# Patient Record
Sex: FEMALE | Race: WHITE | NOT HISPANIC OR LATINO | Employment: UNEMPLOYED | URBAN - METROPOLITAN AREA
[De-identification: names, ages, dates, MRNs, and addresses within clinical notes are randomized per-mention and may not be internally consistent; named-entity substitution may affect disease eponyms.]

---

## 2021-02-01 ENCOUNTER — TELEPHONE (OUTPATIENT)
Dept: PODIATRY | Facility: CLINIC | Age: 51
End: 2021-02-01

## 2021-02-01 NOTE — TELEPHONE ENCOUNTER
Called patient and left message to reschedule her appointment with Dr Noemi Villareal for today due to inclement weather

## 2021-02-15 ENCOUNTER — OFFICE VISIT (OUTPATIENT)
Dept: PODIATRY | Facility: CLINIC | Age: 51
End: 2021-02-15
Payer: COMMERCIAL

## 2021-02-15 VITALS
TEMPERATURE: 98.7 F | HEIGHT: 66 IN | HEART RATE: 101 BPM | BODY MASS INDEX: 33.23 KG/M2 | WEIGHT: 206.8 LBS | SYSTOLIC BLOOD PRESSURE: 115 MMHG | DIASTOLIC BLOOD PRESSURE: 80 MMHG

## 2021-02-15 DIAGNOSIS — E11.40 TYPE 2 DIABETES MELLITUS WITH DIABETIC NEUROPATHY, WITH LONG-TERM CURRENT USE OF INSULIN (HCC): Primary | ICD-10-CM

## 2021-02-15 DIAGNOSIS — M79.2 NEUROPATHIC PAIN OF FOOT, UNSPECIFIED LATERALITY: ICD-10-CM

## 2021-02-15 DIAGNOSIS — Z79.4 TYPE 2 DIABETES MELLITUS WITH DIABETIC NEUROPATHY, WITH LONG-TERM CURRENT USE OF INSULIN (HCC): Primary | ICD-10-CM

## 2021-02-15 DIAGNOSIS — I73.9 PERIPHERAL VASCULAR DISEASE, UNSPECIFIED (HCC): ICD-10-CM

## 2021-02-15 PROCEDURE — 99204 OFFICE O/P NEW MOD 45 MIN: CPT | Performed by: PODIATRIST

## 2021-02-15 RX ORDER — PEN NEEDLE, DIABETIC 31 GX5/16"
NEEDLE, DISPOSABLE MISCELLANEOUS
COMMUNITY
Start: 2021-01-29

## 2021-02-15 RX ORDER — INSULIN LISPRO 100 [IU]/ML
INJECTION, SOLUTION INTRAVENOUS; SUBCUTANEOUS
COMMUNITY
Start: 2021-01-29

## 2021-02-15 RX ORDER — LISINOPRIL AND HYDROCHLOROTHIAZIDE 25; 20 MG/1; MG/1
TABLET ORAL
COMMUNITY
Start: 2021-02-14

## 2021-02-15 RX ORDER — INSULIN GLARGINE 100 [IU]/ML
INJECTION, SOLUTION SUBCUTANEOUS
COMMUNITY
Start: 2021-01-18

## 2021-02-15 RX ORDER — ATORVASTATIN CALCIUM 40 MG/1
TABLET, FILM COATED ORAL
COMMUNITY
Start: 2021-01-18

## 2021-02-15 RX ORDER — BLOOD-GLUCOSE METER
KIT MISCELLANEOUS
COMMUNITY
Start: 2021-01-18 | End: 2022-03-29

## 2021-02-15 RX ORDER — LANCETS 33 GAUGE
EACH MISCELLANEOUS
COMMUNITY
Start: 2021-01-18 | End: 2022-03-29

## 2021-02-15 RX ORDER — FLUCONAZOLE 150 MG/1
TABLET ORAL
COMMUNITY
Start: 2021-01-18 | End: 2022-05-04 | Stop reason: ALTCHOICE

## 2021-02-15 RX ORDER — GABAPENTIN 600 MG/1
TABLET ORAL
COMMUNITY
Start: 2021-02-14 | End: 2021-04-12 | Stop reason: SDUPTHER

## 2021-02-15 RX ORDER — DULOXETIN HYDROCHLORIDE 30 MG/1
30 CAPSULE, DELAYED RELEASE ORAL DAILY
Qty: 30 CAPSULE | Refills: 0 | Status: SHIPPED | OUTPATIENT
Start: 2021-02-15 | End: 2021-03-15 | Stop reason: SINTOL

## 2021-02-15 RX ORDER — BLOOD SUGAR DIAGNOSTIC
STRIP MISCELLANEOUS
COMMUNITY
Start: 2021-01-28 | End: 2022-03-29

## 2021-02-15 NOTE — PROGRESS NOTES
PATIENT:  Jerome Borden    1970      ASSESSMENT:     1  Type 2 diabetes mellitus with diabetic neuropathy, with long-term current use of insulin (HCC)  VAS lower limb arterial duplex, complete bilateral    DULoxetine (CYMBALTA) 30 mg delayed release capsule   2  Neuropathic pain of foot, unspecified laterality  DULoxetine (CYMBALTA) 30 mg delayed release capsule   3  Peripheral vascular disease, unspecified ([address] )  VAS lower limb arterial duplex, complete bilateral         PLAN:  1  Patient was counseled on the condition and diagnosis  2   Educated disease prevention and risks related to diabetes  3   Educated proper daily foot care and exam   Instructed proper skin care / protection and footwear  Instructed to identify any signs of infection and related foot problem  4   She has painful neuropathy in her feet  Instructed her to control tight management of BS to decreased symptoms  Awaits endocrinology evaluation  5  Will add Cymbalta  Consider pain management consultation  6  Discussed possible surgical nerve decompression if her BS is under better control  7  She has decreased pedal pulses and scheduled her for LE arterial study  8  The patient will return in 1 month  Subjective:          HPI  The patient presents with chief complaint of painful neuropathy in her feet  Increased neurologic pain in the last couple of years with shooting, throbbing, and pins/needles  She also has numbness and cold sensation  Gabapentin takes a edge off of her pain  Her pain is about 8-9 out of 10  Pain can keep her up at night  She was seen by another podiatrist and neurologist for her condition  The patient has diabetes for 20 years  The blood glucose is under control and the last HbA1c was 12 5  The patient denied any history of diabetic foot ulcer, related foot infection, or amputation        The following portions of the patient's history were reviewed and updated as appropriate: allergies, current medications, past family history, past medical history, past social history, past surgical history and problem list   All pertinent labs and images were reviewed  Past Medical History  Past Medical History:   Diagnosis Date    Asthma     Diabetes mellitus ([address] )     Hyperlipidemia     Hypertension        Past Surgical History  History reviewed  No pertinent surgical history  Allergies:  Patient has no known allergies  Medications:  Current Outpatient Medications   Medication Sig Dispense Refill    atorvastatin (LIPITOR) 40 mg tablet       B-D UF III MINI PEN NEEDLES 31G X 5 MM MISC       Blood Glucose Monitoring Suppl (OneTouch Verio Reflect) w/Device KIT       DULoxetine (CYMBALTA) 30 mg delayed release capsule Take 1 capsule (30 mg total) by mouth daily 30 capsule 0    fluconazole (DIFLUCAN) 150 mg tablet       gabapentin (NEURONTIN) 600 MG tablet       insulin lispro (HumaLOG) 100 units/mL injection pen       Lancets (OneTouch Delica Plus [id number]) MISC       Lantus SoloStar 100 units/mL injection pen       lisinopril-hydrochlorothiazide (PRINZIDE,ZESTORETIC) 20-25 MG per tablet       OneTouch Verio test strip        No current facility-administered medications for this visit  Social History:  Social History     Socioeconomic History    Marital status:       Spouse name: None    Number of children: None    Years of education: None    Highest education level: None   Occupational History    None   Social Needs    Financial resource strain: None    Food insecurity     Worry: None     Inability: None    Transportation needs     Medical: None     Non-medical: None   Tobacco Use    Smoking status: Former Smoker    Smokeless tobacco: Never Used   Substance and Sexual Activity    Alcohol use: None    Drug use: None    Sexual activity: None   Lifestyle    Physical activity     Days per week: None     Minutes per session: None    Stress: None Relationships    Social connections     Talks on phone: None     Gets together: None     Attends Religion service: None     Active member of club or organization: None     Attends meetings of clubs or organizations: None     Relationship status: None    Intimate partner violence     Fear of current or ex partner: None     Emotionally abused: None     Physically abused: None     Forced sexual activity: None   Other Topics Concern    None   Social History Narrative    None        Review of Systems   Constitutional: Negative for appetite change, chills and fever  HENT: Negative for sore throat  Respiratory: Negative for cough and shortness of breath  Cardiovascular: Negative for chest pain  Gastrointestinal: Negative for diarrhea, nausea and vomiting  Musculoskeletal: Negative for joint swelling  Skin: Negative for rash and wound  Neurological: Positive for numbness  Negative for weakness  Hematological: Does not bruise/bleed easily  Psychiatric/Behavioral: Negative for behavioral problems and confusion  Objective:      /80   Pulse 101   Temp 98 7 °F (37 1 °C)   Ht 5' 6" (1 676 m)   Wt 93 8 kg (206 lb 12 8 oz)   BMI 33 38 kg/m²          Physical Exam  Vitals signs reviewed  Constitutional:       General: She is not in acute distress  Appearance: Normal appearance  She is not ill-appearing  HENT:      Head: Normocephalic and atraumatic  Eyes:      Extraocular Movements: Extraocular movements intact  Neck:      Musculoskeletal: Normal range of motion and neck supple  Cardiovascular:      Rate and Rhythm: Normal rate and regular rhythm  Pulses: Pulses are weak  Dorsalis pedis pulses are 1+ on the right side and 1+ on the left side  Posterior tibial pulses are 0 on the right side and 0 on the left side  Comments: No ischemia  Pulmonary:      Effort: Pulmonary effort is normal  No respiratory distress     Musculoskeletal:         General: No swelling, tenderness, deformity or signs of injury  Right lower leg: No edema  Left lower leg: No edema  Right foot: No Charcot foot or foot drop  Left foot: No Charcot foot or foot drop  Feet:      Right foot:      Protective Sensation: 10 sites tested  0 sites sensed  Skin integrity: Dry skin present  No ulcer or erythema  Left foot:      Protective Sensation: 10 sites tested  0 sites sensed  Skin integrity: Dry skin present  No ulcer or erythema  Skin:     Findings: No erythema or rash  Neurological:      General: No focal deficit present  Mental Status: She is alert and oriented to person, place, and time  Cranial Nerves: No cranial nerve deficit  Sensory: Sensory deficit present  Motor: No weakness  Coordination: Coordination normal    Psychiatric:         Mood and Affect: Mood normal          Behavior: Behavior normal          Thought Content: Thought content normal          Judgment: Judgment normal              Diabetic Foot Exam    Patient's shoes and socks removed  Right Foot/Ankle   Right Foot Inspection  Skin Exam: skin intact and dry skin no erythema, no pre-ulcer and no ulcer                          Toe Exam: no swelling, no tenderness, erythema and  no right toe deformity  Sensory   Vibration: absent  Proprioception: intact   Monofilament testing: absent  Vascular  Capillary refills: < 3 seconds  The right DP pulse is 1+  The right PT pulse is 0  Left Foot/Ankle  Left Foot Inspection  Skin Exam: skin intact and dry skinno erythema, no pre-ulcer and no ulcer                         Toe Exam: no swelling, no tenderness, no erythema and no left toe deformity                   Sensory   Vibration: absent  Proprioception: intact  Monofilament: absent  Vascular  Capillary refills: < 3 seconds  The left DP pulse is 1+  The left PT pulse is 0  Assign Risk Category:  No deformity present;  Loss of protective sensation; Weak pulses Risk: 2

## 2021-02-23 ENCOUNTER — HOSPITAL ENCOUNTER (OUTPATIENT)
Dept: NON INVASIVE DIAGNOSTICS | Facility: HOSPITAL | Age: 51
Discharge: HOME | End: 2021-02-23
Attending: PODIATRIST
Payer: COMMERCIAL

## 2021-02-23 DIAGNOSIS — Z79.4 TYPE 2 DIABETES MELLITUS WITH DIABETIC NEUROPATHY, WITH LONG-TERM CURRENT USE OF INSULIN (HCC): ICD-10-CM

## 2021-02-23 DIAGNOSIS — E11.40 TYPE 2 DIABETES MELLITUS WITH DIABETIC NEUROPATHY, WITH LONG-TERM CURRENT USE OF INSULIN (HCC): ICD-10-CM

## 2021-02-23 DIAGNOSIS — I73.9 PERIPHERAL VASCULAR DISEASE, UNSPECIFIED (HCC): ICD-10-CM

## 2021-02-23 PROCEDURE — 93923 UPR/LXTR ART STDY 3+ LVLS: CPT

## 2021-02-23 PROCEDURE — 93925 LOWER EXTREMITY STUDY: CPT | Performed by: SURGERY

## 2021-02-23 PROCEDURE — 93922 UPR/L XTREMITY ART 2 LEVELS: CPT | Performed by: SURGERY

## 2021-02-23 PROCEDURE — 93925 LOWER EXTREMITY STUDY: CPT

## 2021-03-15 ENCOUNTER — OFFICE VISIT (OUTPATIENT)
Dept: PODIATRY | Facility: CLINIC | Age: 51
End: 2021-03-15
Payer: COMMERCIAL

## 2021-03-15 VITALS
HEIGHT: 66 IN | SYSTOLIC BLOOD PRESSURE: 128 MMHG | DIASTOLIC BLOOD PRESSURE: 82 MMHG | WEIGHT: 207 LBS | BODY MASS INDEX: 33.27 KG/M2

## 2021-03-15 DIAGNOSIS — L97.511 SKIN ULCER OF TOE OF RIGHT FOOT, LIMITED TO BREAKDOWN OF SKIN (HCC): ICD-10-CM

## 2021-03-15 DIAGNOSIS — Z79.4 TYPE 2 DIABETES MELLITUS WITH DIABETIC NEUROPATHY, WITH LONG-TERM CURRENT USE OF INSULIN (HCC): Primary | ICD-10-CM

## 2021-03-15 DIAGNOSIS — M79.2 NEUROPATHIC PAIN OF FOOT, UNSPECIFIED LATERALITY: ICD-10-CM

## 2021-03-15 DIAGNOSIS — E11.40 TYPE 2 DIABETES MELLITUS WITH DIABETIC NEUROPATHY, WITH LONG-TERM CURRENT USE OF INSULIN (HCC): Primary | ICD-10-CM

## 2021-03-15 PROCEDURE — 99214 OFFICE O/P EST MOD 30 MIN: CPT | Performed by: PODIATRIST

## 2021-03-15 NOTE — PROGRESS NOTES
PATIENT:  Siomara Cabrera    1970      ASSESSMENT:     1  Type 2 diabetes mellitus with diabetic neuropathy, with long-term current use of insulin ([facility] )  Ambulatory referral to Pain Management   2  Neuropathic pain of foot, unspecified laterality  Ambulatory referral to Pain Management   3  Skin ulcer of toe of right foot, limited to breakdown of skin (HCC)  mupirocin (BACTROBAN) 2 % ointment         PLAN:  1  Patient was counseled on the condition and diagnosis  2   Educated disease prevention and risks related to diabetes  3   Reviewed arterial study and the result was discussed  4   Instructed her to control tight management of BS to decreased symptoms  5   D/C Cymbalta  Referred him to pain management  6   Possible surgical nerve decompression if her BS is under better control  7   Bactroban ointment bid on right great toe wound  Instructed proper off-loading  Instructed her to call the office for any increased pain, drainage, redness, worsening, or signs of infection  8  The patient will return in 2 weeks for checking right great toe  Subjective:          HPI  The patient presents with chief complaint of painful neuropathy in her feet  Cymbalta did not seem to help  She had nausea with Cymbalta and stopped it after about 2 weeks  She has shooting, throbbing, and pins/needles  She also has numbness and cold sensation  She is on Gabapentin  BS is still not in good control  She is seeing endocrinology  She noticed a crack on skin right toe  No active bleeding, but it does not seem to heal with neosporin  The following portions of the patient's history were reviewed and updated as appropriate: allergies, current medications, past family history, past medical history, past social history, past surgical history and problem list   All pertinent labs and images were reviewed      Past Medical History  Past Medical History:   Diagnosis Date    Asthma     Diabetes mellitus ([facility] )     Hyperlipidemia     Hypertension        Past Surgical History  History reviewed  No pertinent surgical history  Allergies:  Patient has no known allergies  Medications:  Current Outpatient Medications   Medication Sig Dispense Refill    atorvastatin (LIPITOR) 40 mg tablet       B-D UF III MINI PEN NEEDLES 31G X 5 MM MISC       Blood Glucose Monitoring Suppl (OneTouch Verio Reflect) w/Device KIT       fluconazole (DIFLUCAN) 150 mg tablet       gabapentin (NEURONTIN) 600 MG tablet       insulin lispro (HumaLOG) 100 units/mL injection pen       Lancets (OneTouch Delica Plus [id number]) MISC       Lantus SoloStar 100 units/mL injection pen       lisinopril-hydrochlorothiazide (PRINZIDE,ZESTORETIC) 20-25 MG per tablet       mupirocin (BACTROBAN) 2 % ointment Apply topically 3 (three) times a day 22 g 0    OneTouch Verio test strip        No current facility-administered medications for this visit  Social History:  Social History     Socioeconomic History    Marital status:       Spouse name: None    Number of children: None    Years of education: None    Highest education level: None   Occupational History    None   Social Needs    Financial resource strain: None    Food insecurity     Worry: None     Inability: None    Transportation needs     Medical: None     Non-medical: None   Tobacco Use    Smoking status: Former Smoker    Smokeless tobacco: Never Used   Substance and Sexual Activity    Alcohol use: None    Drug use: None    Sexual activity: None   Lifestyle    Physical activity     Days per week: None     Minutes per session: None    Stress: None   Relationships    Social connections     Talks on phone: None     Gets together: None     Attends Jewish service: None     Active member of club or organization: None     Attends meetings of clubs or organizations: None     Relationship status: None    Intimate partner violence     Fear of current or ex partner: None     Emotionally abused: None     Physically abused: None     Forced sexual activity: None   Other Topics Concern    None   Social History Narrative    None        Review of Systems   Constitutional: Negative for appetite change, chills and fever  HENT: Negative for sore throat  Respiratory: Negative for cough and shortness of breath  Cardiovascular: Negative for chest pain  Gastrointestinal: Negative for diarrhea, nausea and vomiting  Neurological: Positive for numbness  Negative for weakness  Hematological: Negative  Objective:      /82   Ht 5' 6" (1 676 m)   Wt 93 9 kg (207 lb)   BMI 33 41 kg/m²          Physical Exam  Vitals signs reviewed  Constitutional:       General: She is not in acute distress  Appearance: Normal appearance  She is not ill-appearing  Cardiovascular:      Rate and Rhythm: Normal rate and regular rhythm  Pulses: Pulses are weak  Dorsalis pedis pulses are 1+ on the right side and 1+ on the left side  Posterior tibial pulses are 0 on the right side and 0 on the left side  Comments: No ischemia  Pulmonary:      Effort: Pulmonary effort is normal  No respiratory distress  Musculoskeletal:         General: No swelling, tenderness, deformity or signs of injury  Right lower leg: No edema  Left lower leg: No edema  Right foot: No Charcot foot or foot drop  Left foot: No Charcot foot or foot drop  Feet:      Right foot:      Protective Sensation: 10 sites tested  0 sites sensed  Skin integrity: Dry skin present  No ulcer or erythema  Left foot:      Protective Sensation: 10 sites tested  0 sites sensed  Skin integrity: Dry skin present  No ulcer or erythema  Skin:     General: Skin is warm and dry  Capillary Refill: Capillary refill takes less than 2 seconds  Coloration: Skin is not cyanotic or mottled  Findings: No abscess, erythema, petechiae or rash   Rash is not purpuric  Nails: There is no clubbing  Comments: Fissure with keratosis distal medial right great toe  No cellulitis  Neurological:      General: No focal deficit present  Mental Status: She is alert and oriented to person, place, and time  Cranial Nerves: No cranial nerve deficit  Sensory: Sensory deficit present  Motor: No weakness  Coordination: Coordination normal    Psychiatric:         Mood and Affect: Mood normal          Behavior: Behavior normal          Thought Content:  Thought content normal          Judgment: Judgment normal

## 2021-03-25 ENCOUNTER — TELEPHONE (OUTPATIENT)
Dept: PAIN MEDICINE | Facility: CLINIC | Age: 51
End: 2021-03-25

## 2021-03-25 NOTE — TELEPHONE ENCOUNTER
Lm on vm for pt to c/b  Pls ask if I can email her np paperwork and obtain email  She may pick it up  We can try to mail it, but it may not get there in time

## 2021-03-29 ENCOUNTER — OFFICE VISIT (OUTPATIENT)
Dept: PODIATRY | Facility: CLINIC | Age: 51
End: 2021-03-29
Payer: COMMERCIAL

## 2021-03-29 ENCOUNTER — APPOINTMENT (OUTPATIENT)
Dept: RADIOLOGY | Facility: CLINIC | Age: 51
End: 2021-03-29
Payer: COMMERCIAL

## 2021-03-29 VITALS
BODY MASS INDEX: 34.3 KG/M2 | HEART RATE: 102 BPM | WEIGHT: 213.4 LBS | SYSTOLIC BLOOD PRESSURE: 135 MMHG | HEIGHT: 66 IN | DIASTOLIC BLOOD PRESSURE: 84 MMHG

## 2021-03-29 DIAGNOSIS — L97.511 SKIN ULCER OF TOE OF RIGHT FOOT, LIMITED TO BREAKDOWN OF SKIN (HCC): ICD-10-CM

## 2021-03-29 DIAGNOSIS — L03.031 CELLULITIS OF TOE, RIGHT: Primary | ICD-10-CM

## 2021-03-29 DIAGNOSIS — Z79.4 TYPE 2 DIABETES MELLITUS WITH DIABETIC NEUROPATHY, WITH LONG-TERM CURRENT USE OF INSULIN (HCC): ICD-10-CM

## 2021-03-29 DIAGNOSIS — L03.031 CELLULITIS OF TOE, RIGHT: ICD-10-CM

## 2021-03-29 DIAGNOSIS — E11.40 TYPE 2 DIABETES MELLITUS WITH DIABETIC NEUROPATHY, WITH LONG-TERM CURRENT USE OF INSULIN (HCC): ICD-10-CM

## 2021-03-29 PROCEDURE — 73660 X-RAY EXAM OF TOE(S): CPT

## 2021-03-29 PROCEDURE — 99214 OFFICE O/P EST MOD 30 MIN: CPT | Performed by: PODIATRIST

## 2021-03-29 RX ORDER — SULFAMETHOXAZOLE AND TRIMETHOPRIM 800; 160 MG/1; MG/1
1 TABLET ORAL EVERY 12 HOURS SCHEDULED
Qty: 14 TABLET | Refills: 0 | Status: SHIPPED | OUTPATIENT
Start: 2021-03-29 | End: 2021-04-05

## 2021-04-12 ENCOUNTER — CONSULT (OUTPATIENT)
Dept: PAIN MEDICINE | Facility: CLINIC | Age: 51
End: 2021-04-12
Payer: COMMERCIAL

## 2021-04-12 ENCOUNTER — OFFICE VISIT (OUTPATIENT)
Dept: PODIATRY | Facility: CLINIC | Age: 51
End: 2021-04-12
Payer: COMMERCIAL

## 2021-04-12 VITALS
HEART RATE: 85 BPM | WEIGHT: 211 LBS | BODY MASS INDEX: 33.91 KG/M2 | SYSTOLIC BLOOD PRESSURE: 149 MMHG | HEIGHT: 66 IN | DIASTOLIC BLOOD PRESSURE: 89 MMHG

## 2021-04-12 VITALS
HEART RATE: 100 BPM | WEIGHT: 211 LBS | BODY MASS INDEX: 33.91 KG/M2 | DIASTOLIC BLOOD PRESSURE: 84 MMHG | TEMPERATURE: 97.3 F | HEIGHT: 66 IN | SYSTOLIC BLOOD PRESSURE: 138 MMHG

## 2021-04-12 DIAGNOSIS — E11.40 TYPE 2 DIABETES MELLITUS WITH DIABETIC NEUROPATHY, WITH LONG-TERM CURRENT USE OF INSULIN (HCC): Primary | ICD-10-CM

## 2021-04-12 DIAGNOSIS — E11.42 DIABETIC POLYNEUROPATHY ASSOCIATED WITH TYPE 2 DIABETES MELLITUS (HCC): ICD-10-CM

## 2021-04-12 DIAGNOSIS — M79.605 CHRONIC PAIN OF BOTH LOWER EXTREMITIES: ICD-10-CM

## 2021-04-12 DIAGNOSIS — G89.29 CHRONIC PAIN OF BOTH LOWER EXTREMITIES: ICD-10-CM

## 2021-04-12 DIAGNOSIS — M79.673 CHRONIC FOOT PAIN, UNSPECIFIED LATERALITY: ICD-10-CM

## 2021-04-12 DIAGNOSIS — M79.2 NEUROPATHIC PAIN OF FOOT, UNSPECIFIED LATERALITY: ICD-10-CM

## 2021-04-12 DIAGNOSIS — G89.4 CHRONIC PAIN SYNDROME: Primary | ICD-10-CM

## 2021-04-12 DIAGNOSIS — Z79.4 TYPE 2 DIABETES MELLITUS WITH DIABETIC NEUROPATHY, WITH LONG-TERM CURRENT USE OF INSULIN (HCC): Primary | ICD-10-CM

## 2021-04-12 DIAGNOSIS — M79.604 CHRONIC PAIN OF BOTH LOWER EXTREMITIES: ICD-10-CM

## 2021-04-12 DIAGNOSIS — G89.29 CHRONIC FOOT PAIN, UNSPECIFIED LATERALITY: ICD-10-CM

## 2021-04-12 DIAGNOSIS — L03.031 CELLULITIS OF TOE, RIGHT: ICD-10-CM

## 2021-04-12 PROCEDURE — 99213 OFFICE O/P EST LOW 20 MIN: CPT | Performed by: PODIATRIST

## 2021-04-12 PROCEDURE — 99214 OFFICE O/P EST MOD 30 MIN: CPT | Performed by: NURSE PRACTITIONER

## 2021-04-12 RX ORDER — GABAPENTIN 600 MG/1
TABLET ORAL
Qty: 120 TABLET | Refills: 1 | Status: SHIPPED | OUTPATIENT
Start: 2021-04-12 | End: 2021-06-07

## 2021-04-12 NOTE — PROGRESS NOTES
PATIENT:  Nelma Kawasaki    1970      ASSESSMENT:     1  Type 2 diabetes mellitus with diabetic neuropathy, with long-term current use of insulin ([facility] )     2  Neuropathic pain of foot, unspecified laterality     3  Cellulitis of toe, right           PLAN:  1  Patient was counseled on the condition and diagnosis  2   X-ray was personally reviewed and discussed  Also reviewed the note from pain management  3   Redness is mostly resolved  Instructed proper skin care and nail care  Discussed possible partial nail avulsion depending on the progress  4   Instructed her to call the office for any increased pain, drainage, redness, worsening, or signs of infection  5  Instructed supportive care and medical treatment for her neuropathy  The patient will return in 2 months  Subjective:          HPI  The patient presents for evaluation of right great toe  No pain or drainage  Decreased redness  Slight burning sensation at times  BS is still up and down  The following portions of the patient's history were reviewed and updated as appropriate: allergies, current medications, past family history, past medical history, past social history, past surgical history and problem list   All pertinent labs and images were reviewed  Past Medical History  Past Medical History:   Diagnosis Date    Asthma     Diabetes mellitus ([facility] )     History of angina     Hyperlipidemia     Hypertension        Past Surgical History  Past Surgical History:   Procedure Laterality Date    BLADDER SURGERY          Allergies:  Patient has no known allergies      Medications:  Current Outpatient Medications   Medication Sig Dispense Refill    atorvastatin (LIPITOR) 40 mg tablet       B-D UF III MINI PEN NEEDLES 31G X 5 MM MISC       Blood Glucose Monitoring Suppl (OneTouch Verio Reflect) w/Device KIT       fluconazole (DIFLUCAN) 150 mg tablet       gabapentin (NEURONTIN) 600 MG tablet Take 1 in AM and afternoon and 2 PO HS  120 tablet 1    insulin lispro (HumaLOG) 100 units/mL injection pen       Lancets (OneTouch Delica Plus [id number]) MISC       Lantus SoloStar 100 units/mL injection pen       lisinopril-hydrochlorothiazide (PRINZIDE,ZESTORETIC) 20-25 MG per tablet       OneTouch Verio test strip        No current facility-administered medications for this visit  Social History:  Social History     Socioeconomic History    Marital status:      Spouse name: None    Number of children: None    Years of education: None    Highest education level: None   Occupational History    None   Social Needs    Financial resource strain: None    Food insecurity     Worry: None     Inability: None    Transportation needs     Medical: None     Non-medical: None   Tobacco Use    Smoking status: Former Smoker    Smokeless tobacco: Never Used   Substance and Sexual Activity    Alcohol use: Never     Frequency: Never    Drug use: Never    Sexual activity: None   Lifestyle    Physical activity     Days per week: None     Minutes per session: None    Stress: None   Relationships    Social connections     Talks on phone: None     Gets together: None     Attends Latter-day service: None     Active member of club or organization: None     Attends meetings of clubs or organizations: None     Relationship status: None    Intimate partner violence     Fear of current or ex partner: None     Emotionally abused: None     Physically abused: None     Forced sexual activity: None   Other Topics Concern    None   Social History Narrative    None        Review of Systems   Constitutional: Negative for appetite change, chills and fever  HENT: Negative for sore throat  Respiratory: Negative for cough and shortness of breath  Cardiovascular: Negative for chest pain  Gastrointestinal: Negative for diarrhea, nausea and vomiting  Neurological: Positive for numbness  Negative for weakness  Hematological: Negative  Objective:      /89   Pulse 85   Ht 5' 6" (1 676 m) Comment: verbal  Wt 95 7 kg (211 lb)   BMI 34 06 kg/m²          Physical Exam  Vitals signs reviewed  Constitutional:       General: She is not in acute distress  Appearance: Normal appearance  She is not ill-appearing  Cardiovascular:      Rate and Rhythm: Normal rate and regular rhythm  Pulses:           Dorsalis pedis pulses are 1+ on the right side and 1+ on the left side  Posterior tibial pulses are 0 on the right side and 0 on the left side  Comments: No ischemia  Pulmonary:      Effort: Pulmonary effort is normal  No respiratory distress  Musculoskeletal:         General: No swelling, tenderness, deformity or signs of injury  Right lower leg: No edema  Left lower leg: No edema  Right foot: No Charcot foot or foot drop  Left foot: No Charcot foot or foot drop  Feet:      Right foot:      Protective Sensation: 10 sites tested  0 sites sensed  Skin integrity: Dry skin present  No ulcer or erythema  Left foot:      Protective Sensation: 10 sites tested  0 sites sensed  Skin integrity: Dry skin present  No ulcer or erythema  Skin:     General: Skin is warm and dry  Capillary Refill: Capillary refill takes less than 2 seconds  Coloration: Skin is not cyanotic or mottled  Findings: No abscess, erythema, petechiae or rash  Rash is not purpuric  Nails: There is no clubbing  Comments: No obvious ingrown nail right great toe  Nail plate is intact  Minimal redness right great toe around the nail  No warmth  Neurological:      General: No focal deficit present  Mental Status: She is alert and oriented to person, place, and time  Cranial Nerves: No cranial nerve deficit  Sensory: Sensory deficit present  Motor: No weakness        Coordination: Coordination normal    Psychiatric:         Mood and Affect: Mood normal          Behavior: Behavior normal          Thought Content:  Thought content normal          Judgment: Judgment normal

## 2021-04-12 NOTE — LETTER
April 13, 2021     Tawanda Dupont, [address]    Patient: Sandra Gomez   YOB: 1970   Date of Visit: 4/12/2021       Dear Dr Abhijit Flores:    Thank you for referring Sandra Gomez to me for evaluation  Below are my notes for this consultation  If you have questions, please do not hesitate to call me  I look forward to following your patient along with you  Sincerely,        NEO Deal        CC: No Recipients  NEO Deal  4/12/2021  8:40 AM  Attested  Assessment:  1  Chronic pain syndrome    2  Chronic pain of both lower extremities    3  Chronic foot pain, unspecified laterality    4  Neuropathic pain of foot, unspecified laterality    5  Diabetic polyneuropathy associated with type 2 diabetes mellitus ([facility] )        Plan:    While the patient was in the office today, I did have a thorough conversation with the patient regarding their chronic pain syndrome, symptoms, medication regimen, and treatment plan  I discussed with the patient that at this point time her pain symptoms are most likely related to her diabetic neuropathy and are quite classic with regards to presentation for neuropathy  I reviewed with the patient that the best thing she can do for her neuropathy overall is to manage her diet and keep her blood sugars as controlled as possible  The patient was agreeable and verbalized an understanding  I discussed with the patient that at this point time since she is not on the maximum dose of gabapentin and is having trouble sleeping at nighttime, 1 option would be to increase the gabapentin to 2400 mg a day by having her take 2 pills at bedtime which may help her pain overall but also help her sleep better at nighttime  We also discussed the possibility of switching her off of the gabapentin and on to a different neuron membrane stabilizer such as Lyrica    However, for now, since the patient is already on gabapentin and knows how it affects her, we decided to increase the gabapentin to 2400 mg a day as discussed and see how she does and depending on the results we could always consider switching her to Lyrica in the future  I advised the patient that if they experience any side effects or issues with the changes in their medication regiment, they should give our office a call to discuss  I also advised the patient not to drive or operate machinery until they see how the changes in the medication regimen affects them  The patient was agreeable and verbalized an understanding  We also discussed the possibility of spinal cord stimulation in the future, however, I did explain to the patient that this would have to be something we discussed with Dr Izzy Omalley and her hemoglobin A1c would have to be lower than it is currently to prevent the risks of infection or complications from either the trial or the permanent implantation  The patient was agreeable and verbalized an understanding  The patient is schedule follow-up office visit in 8 weeks and at that point time we will re-evaluate her medication regimen and treatment plan options  The patient was agreeable and verbalized an understanding  History of Present Illness: The patient is a 46 y o  female who presents for consultation in regards to Leg Pain and Foot Pain  Symptoms have been present for a little over 5 years as the patient reports she has been diabetic for over 20 years  Symptoms began without any precipitating injury or trauma  The patient reports that her biggest issue is the chronic bilateral leg and foot pain with the numbness and burning sensation occasional, unpredictable, sharp stabbing pains  The patient reports that her pain is typically worse at nighttime and that she has difficulty sleeping despite the fact that she takes over-the-counter medications such as melatonin, Tylenol, and ibuprofen to try to help her sleep on top of her medication    The patient feels that if she could sleep better she would have less pain overall  Pain is reported to be 10 on the numeric rating scale  Symptoms are felt constantly and worst in the nighttime  Symptoms are characterized as burning, sharp, numbing, tingling and throbbing  Symptoms are associated with bilateral leg weakness  Aggravating factors include lying down, standing, sitting, walking, exercise and relaxation  Relieving factors include: NONE  No change in symptoms with bending, leaning forward, leaning bckward, coughing/sneezing and bowel movements  Treatments that have been helpful include: None  Medications to relieve symptoms include Gabapentin 600 mg t i d  the patient reports that her pain is definitely improved with the gabapentin verses when she was not on the gabapentin, however, her biggest issue is sleeping at nighttime  The patient reports that if she could get some solid sleep or at least another hour to as the melatonin seems to help her fall asleep but does not keep her sleep because of the pain, she feels her overall pain would be better  Most recently her podiatrist had tried Cymbalta, however, the patient could not tolerate the significant nausea and lethargy that it caused  Review of Systems:    Review of Systems   Constitutional: Negative for fever and unexpected weight change  HENT: Negative for trouble swallowing  Eyes: Positive for pain and visual disturbance  Respiratory: Positive for shortness of breath  Negative for wheezing  Cardiovascular: Positive for chest pain  Negative for palpitations  Gastrointestinal: Negative for constipation, diarrhea, nausea and vomiting  Endocrine: Positive for polydipsia and polyuria  Negative for cold intolerance and heat intolerance  Genitourinary: Negative for difficulty urinating and frequency  Musculoskeletal: Negative for arthralgias, gait problem, joint swelling and myalgias  Skin: Negative for rash  Neurological: Positive for numbness  Negative for dizziness, seizures, syncope, weakness and headaches  Hematological: Does not bruise/bleed easily  Psychiatric/Behavioral: Negative for dysphoric mood  All other systems reviewed and are negative  Past Medical History:   Diagnosis Date    Asthma     Diabetes mellitus ([facility] )     History of angina     Hyperlipidemia     Hypertension        Past Surgical History:   Procedure Laterality Date    BLADDER SURGERY         History reviewed  No pertinent family history  Social History     Occupational History    Not on file   Tobacco Use    Smoking status: Former Smoker    Smokeless tobacco: Never Used   Substance and Sexual Activity    Alcohol use: Never     Frequency: Never    Drug use: Never    Sexual activity: Not on file         Current Outpatient Medications:     atorvastatin (LIPITOR) 40 mg tablet, , Disp: , Rfl:     gabapentin (NEURONTIN) 600 MG tablet, Take 1 in AM and afternoon and 2 PO HS , Disp: 120 tablet, Rfl: 1    insulin lispro (HumaLOG) 100 units/mL injection pen, , Disp: , Rfl:     Lantus SoloStar 100 units/mL injection pen, , Disp: , Rfl:     lisinopril-hydrochlorothiazide (PRINZIDE,ZESTORETIC) 20-25 MG per tablet, , Disp: , Rfl:     B-D UF III MINI PEN NEEDLES 31G X 5 MM MISC, , Disp: , Rfl:     Blood Glucose Monitoring Suppl (OneTouch Verio Reflect) w/Device KIT, , Disp: , Rfl:     fluconazole (DIFLUCAN) 150 mg tablet, , Disp: , Rfl:     Lancets (OneTouch Delica Plus [id number]) MISC, , Disp: , Rfl:     OneTouch Verio test strip, , Disp: , Rfl:     No Known Allergies    Physical Exam:    /84 (BP Location: Left arm, Patient Position: Sitting, Cuff Size: Standard)   Pulse 100   Temp (!) 97 3 °F (36 3 °C)   Ht 5' 6" (1 676 m)   Wt 95 7 kg (211 lb)   BMI 34 06 kg/m²     Constitutional: normal, well developed, well nourished, alert, in no distress and non-toxic and no overt pain behavior   and overweight  Eyes: anicteric  HEENT: grossly intact  Neck: supple, symmetric, trachea midline and no masses   Pulmonary:even and unlabored  Cardiovascular:No edema or pitting edema present  Skin:Normal without rashes or lesions and well hydrated  Psychiatric:Mood and affect appropriate  Neurologic:Cranial Nerves II-XII grossly intact  Musculoskeletal:The patient's gait is slightly antalgic, but steady without the use of any assistive devices  Minimal edema noted of the bilateral lower extremities upon exam with good cap refill and positive dorsal pedal pulses bilaterally  Imaging    XRAY RIGHT TOES 03/29/2021      INDICATION:   L03 031: Cellulitis of right toe      COMPARISON:  None     VIEWS:  XR TOE RIGHT GREAT MIN 2 VIEW         FINDINGS:     There is no acute fracture or dislocation      No significant degenerative changes      No lytic or blastic osseous lesion      Soft tissues are unremarkable      IMPRESSION:     No acute osseous abnormality  [facility] REPORT  CLINICAL LOWER LIMB ARTERIAL DUPLEX 2/23/2021   Indications:  Patient presents with pain and weakness in her bilateral legs,  R>L, with walking various distances  She also states her feet and legs often  feel very cold  Denies any open wounds or ulcers at this time  Risk Factors  The patient has history of HTN, Diabetes (IDDM) and HLD  Clinical  Right Pressure:  119/ mm Hg, Left Pressure:  120/ mm Hg       FINDINGS:     Segment                Right                       Left                                            Waveform   PSV (cm/s)  EDV  Waveform   PSV (cm/s)  EDV    Common Femoral Artery  Triphasic          65    0  Triphasic          75    0    Prox Profunda          Triphasic          77    5  Triphasic          87    0    Prox SFA               Triphasic          77    0  Triphasic          78    0    Mid SFA                Triphasic          81    0  Triphasic          77    0    Dist SFA               Triphasic          64    0  Triphasic 52    0    Proximal Pop           Triphasic          75    0  Triphasic          55    0    Distal Pop             Triphasic          65    0  Triphasic          52    0    Dist Post Tibial       Triphasic         100    4  Triphasic          72    0    Dist  Ant  Tibial      Triphasic          46    0  Triphasic          46    0             CONCLUSION:  Impression:  RIGHT LOWER LIMB:  This resting evaluation shows no evidence of significant lower extremity  arterial occlusive disease  Ankle/Brachial index: 1 23 which is in the normal category  PVR/ PPG tracings are normal   Metatarsal pressure of 123mmHg  Great toe pressure of 101mmHg, within the healing range for a diabetic     LEFT LOWER LIMB:  This resting evaluation shows no evidence of significant lower extremity  arterial occlusive disease  Ankle/Brachial index: 1 25 which is in the normal category  PVR/ PPG tracings are normal   Metatarsal pressure of 177mmHg  Great toe pressure of 107mmHg, within the healing range for a diabetic     There is no previous study for comparison  Attestation signed by Ahsan Matias DO at 4/13/2021  7:43 AM:    I did not personally evaluate the patient although I was available to the nurse practitioner for consultation as needed  Cosign for administrative purposes complete

## 2021-04-12 NOTE — PROGRESS NOTES
Assessment:  1  Chronic pain syndrome    2  Chronic pain of both lower extremities    3  Chronic foot pain, unspecified laterality    4  Neuropathic pain of foot, unspecified laterality    5  Diabetic polyneuropathy associated with type 2 diabetes mellitus ([address] )        Plan:    While the patient was in the office today, I did have a thorough conversation with the patient regarding their chronic pain syndrome, symptoms, medication regimen, and treatment plan  I discussed with the patient that at this point time her pain symptoms are most likely related to her diabetic neuropathy and are quite classic with regards to presentation for neuropathy  I reviewed with the patient that the best thing she can do for her neuropathy overall is to manage her diet and keep her blood sugars as controlled as possible  The patient was agreeable and verbalized an understanding  I discussed with the patient that at this point time since she is not on the maximum dose of gabapentin and is having trouble sleeping at nighttime, 1 option would be to increase the gabapentin to 2400 mg a day by having her take 2 pills at bedtime which may help her pain overall but also help her sleep better at nighttime  We also discussed the possibility of switching her off of the gabapentin and on to a different neuron membrane stabilizer such as Lyrica  However, for now, since the patient is already on gabapentin and knows how it affects her, we decided to increase the gabapentin to 2400 mg a day as discussed and see how she does and depending on the results we could always consider switching her to Lyrica in the future  I advised the patient that if they experience any side effects or issues with the changes in their medication regiment, they should give our office a call to discuss  I also advised the patient not to drive or operate machinery until they see how the changes in the medication regimen affects them   The patient was agreeable and verbalized an understanding  We also discussed the possibility of spinal cord stimulation in the future, however, I did explain to the patient that this would have to be something we discussed with Dr Amy Pagan and her hemoglobin A1c would have to be lower than it is currently to prevent the risks of infection or complications from either the trial or the permanent implantation  The patient was agreeable and verbalized an understanding  The patient is schedule follow-up office visit in 8 weeks and at that point time we will re-evaluate her medication regimen and treatment plan options  The patient was agreeable and verbalized an understanding  History of Present Illness: The patient is a 46 y o  female who presents for consultation in regards to Leg Pain and Foot Pain  Symptoms have been present for a little over 5 years as the patient reports she has been diabetic for over 20 years  Symptoms began without any precipitating injury or trauma  The patient reports that her biggest issue is the chronic bilateral leg and foot pain with the numbness and burning sensation occasional, unpredictable, sharp stabbing pains  The patient reports that her pain is typically worse at nighttime and that she has difficulty sleeping despite the fact that she takes over-the-counter medications such as melatonin, Tylenol, and ibuprofen to try to help her sleep on top of her medication  The patient feels that if she could sleep better she would have less pain overall  Pain is reported to be 10 on the numeric rating scale  Symptoms are felt constantly and worst in the nighttime  Symptoms are characterized as burning, sharp, numbing, tingling and throbbing  Symptoms are associated with bilateral leg weakness  Aggravating factors include lying down, standing, sitting, walking, exercise and relaxation  Relieving factors include: NONE    No change in symptoms with bending, leaning forward, leaning bckward, coughing/sneezing and bowel movements  Treatments that have been helpful include: None  Medications to relieve symptoms include Gabapentin 600 mg t i d  the patient reports that her pain is definitely improved with the gabapentin verses when she was not on the gabapentin, however, her biggest issue is sleeping at nighttime  The patient reports that if she could get some solid sleep or at least another hour to as the melatonin seems to help her fall asleep but does not keep her sleep because of the pain, she feels her overall pain would be better  Most recently her podiatrist had tried Cymbalta, however, the patient could not tolerate the significant nausea and lethargy that it caused  Review of Systems:    Review of Systems   Constitutional: Negative for fever and unexpected weight change  HENT: Negative for trouble swallowing  Eyes: Positive for pain and visual disturbance  Respiratory: Positive for shortness of breath  Negative for wheezing  Cardiovascular: Positive for chest pain  Negative for palpitations  Gastrointestinal: Negative for constipation, diarrhea, nausea and vomiting  Endocrine: Positive for polydipsia and polyuria  Negative for cold intolerance and heat intolerance  Genitourinary: Negative for difficulty urinating and frequency  Musculoskeletal: Negative for arthralgias, gait problem, joint swelling and myalgias  Skin: Negative for rash  Neurological: Positive for numbness  Negative for dizziness, seizures, syncope, weakness and headaches  Hematological: Does not bruise/bleed easily  Psychiatric/Behavioral: Negative for dysphoric mood  All other systems reviewed and are negative  Past Medical History:   Diagnosis Date    Asthma     [facility] ([facility]     History of angina     Hyperlipidemia     Hypertension        Past Surgical History:   Procedure Laterality Date    BLADDER SURGERY         History reviewed   No pertinent family history  Social History     Occupational History    Not on file   Tobacco Use    Smoking status: Former Smoker    Smokeless tobacco: Never Used   Substance and Sexual Activity    Alcohol use: Never     Frequency: Never    Drug use: Never    Sexual activity: Not on file         Current Outpatient Medications:     atorvastatin (LIPITOR) 40 mg tablet, , Disp: , Rfl:     gabapentin (NEURONTIN) 600 MG tablet, Take 1 in AM and afternoon and 2 PO HS , Disp: 120 tablet, Rfl: 1    insulin lispro (HumaLOG) 100 units/mL injection pen, , Disp: , Rfl:     Lantus SoloStar 100 units/mL injection pen, , Disp: , Rfl:     lisinopril-hydrochlorothiazide (PRINZIDE,ZESTORETIC) 20-25 MG per tablet, , Disp: , Rfl:     B-D UF III MINI PEN NEEDLES 31G X 5 MM MISC, , Disp: , Rfl:     Blood Glucose Monitoring Suppl (OneTouch Verio Reflect) w/Device KIT, , Disp: , Rfl:     fluconazole (DIFLUCAN) 150 mg tablet, , Disp: , Rfl:     Lancets (OneTouch Delica Plus [id number]) MISC, , Disp: , Rfl:     OneTouch Verio test strip, , Disp: , Rfl:     No Known Allergies    Physical Exam:    /84 (BP Location: Left arm, Patient Position: Sitting, Cuff Size: Standard)   Pulse 100   Temp (!) 97 3 °F (36 3 °C)   Ht 5' 6" (1 676 m)   Wt 95 7 kg (211 lb)   BMI 34 06 kg/m²     Constitutional: normal, well developed, well nourished, alert, in no distress and non-toxic and no overt pain behavior  and overweight  Eyes: anicteric  HEENT: grossly intact  Neck: supple, symmetric, trachea midline and no masses   Pulmonary:even and unlabored  Cardiovascular:No edema or pitting edema present  Skin:Normal without rashes or lesions and well hydrated  Psychiatric:Mood and affect appropriate  Neurologic:Cranial Nerves II-XII grossly intact  Musculoskeletal:The patient's gait is slightly antalgic, but steady without the use of any assistive devices    Minimal edema noted of the bilateral lower extremities upon exam with good cap refill and positive dorsal pedal pulses bilaterally  Imaging    XRAY RIGHT TOES 03/29/2021      INDICATION:   L03 031: Cellulitis of right toe      COMPARISON:  None     VIEWS:  XR TOE RIGHT GREAT MIN 2 VIEW         FINDINGS:     There is no acute fracture or dislocation      No significant degenerative changes      No lytic or blastic osseous lesion      Soft tissues are unremarkable      IMPRESSION:     No acute osseous abnormality  [facility] REPORT  CLINICAL LOWER LIMB ARTERIAL DUPLEX 2/23/2021   Indications:  Patient presents with pain and weakness in her bilateral legs,  R>L, with walking various distances  She also states her feet and legs often  feel very cold  Denies any open wounds or ulcers at this time  Risk Factors  The patient has history of HTN, Diabetes (IDDM) and HLD  Clinical  Right Pressure:  119/ mm Hg, Left Pressure:  120/ mm Hg  FINDINGS:     Segment                Right                       Left                                            Waveform   PSV (cm/s)  EDV  Waveform   PSV (cm/s)  EDV    Common Femoral Artery  Triphasic          65    0  Triphasic          75    0    Prox Profunda          Triphasic          77    5  Triphasic          87    0    Prox SFA               Triphasic          77    0  Triphasic          78    0    Mid SFA                Triphasic          81    0  Triphasic          77    0    Dist SFA               Triphasic          64    0  Triphasic          52    0    Proximal Pop           Triphasic          75    0  Triphasic          55    0    Distal Pop             Triphasic          65    0  Triphasic          52    0    Dist Post Tibial       Triphasic         100    4  Triphasic          72    0    Dist  Ant  Tibial      Triphasic          46    0  Triphasic          46    0             CONCLUSION:  Impression:  RIGHT LOWER LIMB:  This resting evaluation shows no evidence of significant lower extremity  arterial occlusive disease    Ankle/Brachial index: 1 23 which is in the normal category  PVR/ PPG tracings are normal   Metatarsal pressure of 123mmHg  Great toe pressure of 101mmHg, within the healing range for a diabetic     LEFT LOWER LIMB:  This resting evaluation shows no evidence of significant lower extremity  arterial occlusive disease  Ankle/Brachial index: 1 25 which is in the normal category  PVR/ PPG tracings are normal   Metatarsal pressure of 177mmHg  Great toe pressure of 107mmHg, within the healing range for a diabetic     There is no previous study for comparison

## 2021-05-24 ENCOUNTER — OFFICE VISIT (OUTPATIENT)
Dept: PODIATRY | Facility: CLINIC | Age: 51
End: 2021-05-24
Payer: COMMERCIAL

## 2021-05-24 VITALS
HEIGHT: 66 IN | DIASTOLIC BLOOD PRESSURE: 84 MMHG | HEART RATE: 101 BPM | SYSTOLIC BLOOD PRESSURE: 129 MMHG | WEIGHT: 209 LBS | BODY MASS INDEX: 33.59 KG/M2

## 2021-05-24 DIAGNOSIS — Z79.4 TYPE 2 DIABETES MELLITUS WITH DIABETIC NEUROPATHY, WITH LONG-TERM CURRENT USE OF INSULIN (HCC): Primary | ICD-10-CM

## 2021-05-24 DIAGNOSIS — L03.031 CELLULITIS OF TOE, RIGHT: ICD-10-CM

## 2021-05-24 DIAGNOSIS — E11.40 TYPE 2 DIABETES MELLITUS WITH DIABETIC NEUROPATHY, WITH LONG-TERM CURRENT USE OF INSULIN (HCC): Primary | ICD-10-CM

## 2021-05-24 PROCEDURE — 99213 OFFICE O/P EST LOW 20 MIN: CPT | Performed by: PODIATRIST

## 2021-05-24 PROCEDURE — 11730 AVULSION NAIL PLATE SIMPLE 1: CPT | Performed by: PODIATRIST

## 2021-05-24 RX ORDER — SULFAMETHOXAZOLE AND TRIMETHOPRIM 800; 160 MG/1; MG/1
1 TABLET ORAL EVERY 12 HOURS SCHEDULED
Qty: 14 TABLET | Refills: 0 | Status: SHIPPED | OUTPATIENT
Start: 2021-05-24 | End: 2021-05-31

## 2021-05-24 RX ORDER — FLASH GLUCOSE SENSOR
KIT MISCELLANEOUS
COMMUNITY
Start: 2021-05-15

## 2021-05-24 NOTE — PROGRESS NOTES
PATIENT:  Aidee Morgan    1970      ASSESSMENT:     1  Type 2 diabetes mellitus with diabetic neuropathy, with long-term current use of insulin ([address] )     2  Cellulitis of toe, right  Nail removal    sulfamethoxazole-trimethoprim (BACTRIM DS) 800-160 mg per tablet         PLAN:  1  Patient was counseled on the condition and diagnosis  2   Reviewed previous office note  She has increased onycholysis and recommended total nail avulsion  3   Surgery was performed as in the procedure  4   Home care instructions were given to the patient including decreased activity, ice and home pain medication as needed for 3 days  Patient will also perform daily dressing changes until the surgical site is fully healed  Patient tolerated the procedure well and with no complications  5  Rx: Bactrim DS bid for 7 days  6   Instructed her to call the office for any increased pain, drainage, redness, worsening, or signs of infection  7  The patient will return in 2 weeks  Nail removal    Date/Time: 5/24/2021 1:55 PM  Performed by: Aida Terrell DPM  Authorized by: Aida Terrell DPM     Patient location:  [facility] Protocol:  Consent: Verbal consent obtained  Risks and benefits: risks, benefits and alternatives were discussed  Consent given by: patient  Time out: Immediately prior to procedure a "time out" was called to verify the correct patient, procedure, equipment, support staff and site/side marked as required  Timeout called at: 5/24/2021 1:55 PM   Patient understanding: patient states understanding of the procedure being performed  Patient identity confirmed: verbally with patient      Location:     Foot:  R big toe  Pre-procedure details:     Skin preparation:  Betadine  Anesthesia (see MAR for exact dosages):      Anesthesia method:  Local infiltration    Local anesthetic:  Lidocaine 1% w/o epi  Nail Removal:     Nail removed:  Complete  Post-procedure details:     Dressing:  4x4 sterile gauze, gauze roll and antibiotic ointment    Patient tolerance of procedure: Tolerated well, no immediate complications          Subjective:        HPI  The patient presents with a chief complaint of increased redness around toenail right great toe  Burning sensation increased  No drainage  Nail looks looser  Her BS has been better  The following portions of the patient's history were reviewed and updated as appropriate: allergies, current medications, past family history, past medical history, past social history, past surgical history and problem list   All pertinent labs and images were reviewed  Past Medical History  Past Medical History:   Diagnosis Date    Asthma     Diabetes mellitus ([address] )     History of angina     Hyperlipidemia     Hypertension        Past Surgical History  Past Surgical History:   Procedure Laterality Date    BLADDER SURGERY          Allergies:  Patient has no known allergies  Medications:  Current Outpatient Medications   Medication Sig Dispense Refill    atorvastatin (LIPITOR) 40 mg tablet       B-D UF III MINI PEN NEEDLES 31G X 5 MM MISC       Blood Glucose Monitoring Suppl (OneTouch Verio Reflect) w/Device KIT       Continuous Blood Gluc Sensor (FreeStyle Mina 14 Day Sensor) MISC Use as directed      fluconazole (DIFLUCAN) 150 mg tablet       gabapentin (NEURONTIN) 600 MG tablet Take 1 in AM and afternoon and 2 PO HS  120 tablet 1    insulin lispro (HumaLOG) 100 units/mL injection pen       Lancets (OneTouch Delica Plus [id number]) MISC       Lantus SoloStar 100 units/mL injection pen       lisinopril-hydrochlorothiazide (PRINZIDE,ZESTORETIC) 20-25 MG per tablet       OneTouch Verio test strip       sulfamethoxazole-trimethoprim (BACTRIM DS) 800-160 mg per tablet Take 1 tablet by mouth every 12 (twelve) hours for 7 days 14 tablet 0     No current facility-administered medications for this visit          Social History:  Social History     Socioeconomic History    Marital status:      Spouse name: None    Number of children: None    Years of education: None    Highest education level: None   Occupational History    None   Social Needs    Financial resource strain: None    Food insecurity     Worry: None     Inability: None    Transportation needs     Medical: None     Non-medical: None   Tobacco Use    Smoking status: Former Smoker    Smokeless tobacco: Never Used   Substance and Sexual Activity    Alcohol use: Never     Frequency: Never    Drug use: Never    Sexual activity: None   Lifestyle    Physical activity     Days per week: None     Minutes per session: None    Stress: None   Relationships    Social connections     Talks on phone: None     Gets together: None     Attends Restoration service: None     Active member of club or organization: None     Attends meetings of clubs or organizations: None     Relationship status: None    Intimate partner violence     Fear of current or ex partner: None     Emotionally abused: None     Physically abused: None     Forced sexual activity: None   Other Topics Concern    None   Social History Narrative    None        Review of Systems   Constitutional: Negative for appetite change, chills and fever  HENT: Negative for sore throat  Respiratory: Negative for cough and shortness of breath  Cardiovascular: Negative for chest pain  Gastrointestinal: Negative for diarrhea, nausea and vomiting  Neurological: Positive for numbness  Negative for weakness  Hematological: Negative  Objective:      /84   Pulse 101   Ht 5' 6" (1 676 m) Comment: verbal  Wt 94 8 kg (209 lb)   BMI 33 73 kg/m²          Physical Exam  Vitals signs reviewed  Constitutional:       General: She is not in acute distress  Appearance: Normal appearance  She is not ill-appearing  Cardiovascular:      Rate and Rhythm: Normal rate and regular rhythm        Pulses:           Dorsalis pedis pulses are 1+ on the right side and 1+ on the left side  Posterior tibial pulses are 0 on the right side and 0 on the left side  Comments: No ischemia  Pulmonary:      Effort: Pulmonary effort is normal  No respiratory distress  Musculoskeletal:         General: No swelling, tenderness, deformity or signs of injury  Right lower leg: No edema  Left lower leg: No edema  Right foot: No Charcot foot or foot drop  Left foot: No Charcot foot or foot drop  Feet:      Right foot:      Skin integrity: Dry skin present  No ulcer or erythema  Left foot:      Skin integrity: Dry skin present  No ulcer or erythema  Skin:     General: Skin is warm and dry  Capillary Refill: Capillary refill takes less than 2 seconds  Coloration: Skin is not cyanotic or mottled  Findings: No abscess, erythema, petechiae or rash  Rash is not purpuric  Nails: There is no clubbing  Comments: Increased onycholysis right great toenail  Increased redness around proximal nail fold with sensitivity and tenderness  No purulence  Neurological:      General: No focal deficit present  Mental Status: She is alert and oriented to person, place, and time  Cranial Nerves: No cranial nerve deficit  Sensory: Sensory deficit present  Motor: No weakness  Coordination: Coordination normal    Psychiatric:         Mood and Affect: Mood normal          Behavior: Behavior normal          Thought Content:  Thought content normal          Judgment: Judgment normal

## 2021-06-07 ENCOUNTER — OFFICE VISIT (OUTPATIENT)
Dept: PAIN MEDICINE | Facility: CLINIC | Age: 51
End: 2021-06-07
Payer: COMMERCIAL

## 2021-06-07 VITALS
SYSTOLIC BLOOD PRESSURE: 130 MMHG | WEIGHT: 212 LBS | HEIGHT: 66 IN | DIASTOLIC BLOOD PRESSURE: 78 MMHG | HEART RATE: 84 BPM | BODY MASS INDEX: 34.07 KG/M2 | TEMPERATURE: 97.9 F

## 2021-06-07 DIAGNOSIS — M79.2 NEUROPATHIC PAIN OF FOOT, UNSPECIFIED LATERALITY: ICD-10-CM

## 2021-06-07 DIAGNOSIS — M79.604 CHRONIC PAIN OF BOTH LOWER EXTREMITIES: ICD-10-CM

## 2021-06-07 DIAGNOSIS — G89.29 CHRONIC FOOT PAIN, UNSPECIFIED LATERALITY: ICD-10-CM

## 2021-06-07 DIAGNOSIS — E11.42 DIABETIC POLYNEUROPATHY ASSOCIATED WITH TYPE 2 DIABETES MELLITUS (HCC): ICD-10-CM

## 2021-06-07 DIAGNOSIS — G89.29 CHRONIC PAIN OF BOTH LOWER EXTREMITIES: ICD-10-CM

## 2021-06-07 DIAGNOSIS — G89.4 CHRONIC PAIN SYNDROME: Primary | ICD-10-CM

## 2021-06-07 DIAGNOSIS — M79.605 CHRONIC PAIN OF BOTH LOWER EXTREMITIES: ICD-10-CM

## 2021-06-07 DIAGNOSIS — M79.673 CHRONIC FOOT PAIN, UNSPECIFIED LATERALITY: ICD-10-CM

## 2021-06-07 PROCEDURE — 99214 OFFICE O/P EST MOD 30 MIN: CPT | Performed by: NURSE PRACTITIONER

## 2021-06-07 RX ORDER — PREGABALIN 100 MG/1
CAPSULE ORAL
Qty: 120 CAPSULE | Refills: 1 | Status: SHIPPED | OUTPATIENT
Start: 2021-06-07 | End: 2021-10-20 | Stop reason: SDUPTHER

## 2021-06-07 NOTE — PROGRESS NOTES
Assessment:  1  Chronic pain syndrome    2  Chronic pain of both lower extremities    3  Chronic foot pain, unspecified laterality    4  Neuropathic pain of foot, unspecified laterality    5  Diabetic polyneuropathy associated with type 2 diabetes mellitus ([facility] [address] )        Plan:   While the patient was in the office today, I did have a thorough conversation with the patient regarding their chronic pain syndrome, symptoms, medication regimen, and treatment plan  I explained to the patient that at this point time it is still in her best interest to continue to work on her diet and home exercise and getting her hemoglobin A1c and blood sugars more managed and that it may take a long time for everything in her body to level out  And stabilized  I tried to encourage her with regards to her hard work and not to let it get her down that her symptoms seem to be worsening even though she is doing what sounds like all the right things  The patient was agreeable and verbalized an understanding  With regards to her medication regimen and treatment plan options, explained to the patient at this point since she is definitely on the maximum dose of gabapentin and has not seen any improvement in her pain symptoms, I feel it is in her best interest to titrate off of the gabapentin and try different neuron membrane stabilizer such as Lyrica  I did give the patient a titration schedule to come off of the gabapentin and on to the Lyrica 100 mg with a titration schedule to slowly work up to 400 mg a day before her next office visit  I advised the patient that if they experience any side effects or issues with the changes in their medication regiment, they should give our office a call to discuss  I also advised the patient not to drive or operate machinery until they see how the changes in the medication regimen affects them  The patient was agreeable and verbalized an understanding           The patient will follow-up in 6-7 weeks for medication prescription refill and reevaluation  The patient was advised to contact the office should their symptoms worsen in the interim  The patient was agreeable and verbalized an understanding  History of Present Illness: The patient is a 46 y o  female last seen on 04/12/2021 who presents for a follow up office visit in regards to chronic pain syndrome secondary to Diabetic neuropathy of the lower extremities  The patient currently reports that since her last office visit despite increasing the gabapentin to the maximum dosage of 2400 mg a day she has not seen any improvement in her lower extremity neuropathy and feels that it is slowly continuing to worsen, especially at nighttime  The patient reports that she has been making a very diligent effort on working on her blood sugars as she has got and down from the 500 and 600 range to the 100-200 range as her last hemoglobin A1c was 14  The patient presents today for a regular medication follow-up visit and to discuss her treatment plan options  Current pain medications includes: Gabapentin 2400 mg daily  The patient reports that this regimen is providing  Minimal to no pain relief  The patient is reporting no side effects from this pain medication regimen  I have personally reviewed and/or updated the patient's past medical history, past surgical history, family history, social history, current medications, allergies, and vital signs today  Review of Systems:    Review of Systems   Respiratory: Negative for shortness of breath  Cardiovascular: Negative for chest pain  Gastrointestinal: Negative for constipation, diarrhea, nausea and vomiting  Musculoskeletal: Positive for gait problem and joint swelling  Negative for arthralgias and myalgias  Skin: Negative for rash  Neurological: Negative for dizziness, seizures and weakness  All other systems reviewed and are negative          Past Medical History:   Diagnosis Date    Asthma     Diabetes mellitus ([facility] [address] )     History of angina     Hyperlipidemia     Hypertension        Past Surgical History:   Procedure Laterality Date    BLADDER SURGERY         History reviewed  No pertinent family history  Social History     Occupational History    Not on file   Tobacco Use    Smoking status: Former Smoker    Smokeless tobacco: Never Used   Substance and Sexual Activity    Alcohol use: Never     Frequency: Never    Drug use: Never    Sexual activity: Not on file         Current Outpatient Medications:     atorvastatin (LIPITOR) 40 mg tablet, , Disp: , Rfl:     insulin lispro (HumaLOG) 100 units/mL injection pen, , Disp: , Rfl:     Lantus SoloStar 100 units/mL injection pen, , Disp: , Rfl:     lisinopril-hydrochlorothiazide (PRINZIDE,ZESTORETIC) 20-25 MG per tablet, , Disp: , Rfl:     B-D UF III MINI PEN NEEDLES 31G X 5 MM MISC, , Disp: , Rfl:     Blood Glucose Monitoring Suppl (OneTouch Verio Reflect) w/Device KIT, , Disp: , Rfl:     Continuous Blood Gluc Sensor (FreeStyle Mina 14 Day Sensor) MISC, Use as directed, Disp: , Rfl:     fluconazole (DIFLUCAN) 150 mg tablet, , Disp: , Rfl:     Lancets (OneTouch Delica Plus [id number]) MISC, , Disp: , Rfl:     OneTouch Verio test strip, , Disp: , Rfl:     pregabalin (LYRICA) 100 mg capsule, Take 1 PO HS x 4 days, then 1 PO BID x 4 days, then 1 PO TID x 4 days, then 1 in AM and afternoon and 2 PO HS , Disp: 120 capsule, Rfl: 1    No Known Allergies    Physical Exam:    /78 (BP Location: Left arm, Patient Position: Sitting, Cuff Size: Standard)   Pulse 84   Temp 97 9 °F (36 6 °C)   Ht 5' 6" (1 676 m)   Wt 96 2 kg (212 lb)   BMI 34 22 kg/m²     Constitutional:normal, well developed, well nourished, alert, in no distress and non-toxic and no overt pain behavior   and overweight  Eyes:anicteric  HEENT:grossly intact  Neck:supple, symmetric, trachea midline and no masses   Pulmonary:even and unlabored  Cardiovascular:No edema or pitting edema present  Skin:Normal without rashes or lesions and well hydrated  Psychiatric:Mood and affect appropriate  Neurologic:Cranial Nerves II-XII grossly intact  Musculoskeletal:The patient's gait is slightly antalgic, painful, but steady without the use of any assistive devices  Imaging  No orders to display         No orders of the defined types were placed in this encounter

## 2021-06-07 NOTE — PATIENT INSTRUCTIONS
Gabapentin 600 mg: Decrease to 1 pill in AM and afternoon and start Lyrica 100 mg at bed time x 4 days, then decrease Gabapentin to 1 pill daily and increase Lyrica to 2 pills daily x 4 days, then STOP Gabapentin and increase Lyrica to 1 pill 3 x daily x 7days, then increase the Lyrica to 1 pill 4 x daily  Pregabalin (By mouth)   Pregabalin (pre-GA-ba-esmer)  Treats nerve and muscle pain, including fibromyalgia  Also treats partial-onset seizures  Brand Name(s): Lyrica, Lyrica CR   There may be other brand names for this medicine  When This Medicine Should Not Be Used: This medicine is not right for everyone  Do not use it if you had an allergic reaction to pregabalin  How to Use This Medicine:   Capsule, Liquid, Long Acting Tablet  · Take your medicine as directed  Your dose may need to be changed several times to find what works best for you  · Extended-release tablet: Swallow the extended-release tablet whole  Do not crush, break, or chew it  Take it after an evening meal   · Oral liquid: Measure the oral liquid medicine with a marked measuring spoon, oral syringe, or medicine cup  · This medicine should come with a Medication Guide  Ask your pharmacist for a copy if you do not have one  · Missed dose: Take a dose as soon as you remember  If it is almost time for your next dose, wait until then and take a regular dose  Do not take extra medicine to make up for a missed dose  If you miss a dose of the extended-release tablet after your evening meal, take it before bedtime after a snack  If you miss the dose before bedtime, take it after your morning meal  If you do not take the dose the following morning, then take the next dose at your regular time after your evening meal  Do not take 2 doses at the same time  · Store the medicine in a closed container at room temperature, away from heat, moisture, and direct light    Drugs and Foods to Avoid:   Ask your doctor or pharmacist before using any other medicine, including over-the-counter medicines, vitamins, and herbal products  · Some medicines can affect how pregabalin works  Tell your doctor if you are using any of the following:   ? ACE inhibitor (including benazepril, enalapril, lisinopril, quinapril, ramipril)  ? Oral diabetes medicine (including metformin, pioglitazone, rosiglitazone)  · Do not drink alcohol while you are using this medicine  · Tell your doctor if you use anything else that makes you sleepy  Some examples are allergy medicine, narcotic pain medicine, and alcohol  Tell your doctor if you are also using oxycodone, lorazepam, or zolpidem  Warnings While Using This Medicine:   · Tell your doctor if you are pregnant or breastfeeding, or if you have kidney disease, heart failure, heart rhythm problems, lung or breathing problems, a bleeding disorder, diabetes, sores or skin problems, or a low blood platelet count  Tell your doctor if you have a history of angioedema (severe swelling), alcohol or drug abuse, depression, or other mood problems  · This medicine may cause the following problems:   ? Angioedema (severe swelling), which may be life-threatening  ? Changes in mood or behavior, including suicidal thoughts or behavior  ? Respiratory depression (serious breathing problem that can be life-threatening), when used with narcotic pain medicines  ? Peripheral edema (swelling of your hands, ankles, feet, or lower legs)  ? Increased risk for cancer and bleeding  ? Serious muscle problems  ? Heart rhythm changes  · This medicine may make you dizzy or drowsy  It may also cause blurry or double vision  Do not drive or do anything else that could be dangerous until you know how this medicine affects you  · Do not stop using this medicine suddenly  Your doctor will need to slowly decrease your dose before you stop it completely  · Your doctor will do lab tests at regular visits to check on the effects of this medicine   Keep all appointments  · Keep all medicine out of the reach of children  Never share your medicine with anyone  Possible Side Effects While Using This Medicine:   Call your doctor right away if you notice any of these side effects:  · Allergic reaction: Itching or hives, swelling in your face or hands, swelling or tingling in your mouth or throat, chest tightness, trouble breathing  · Blistering, peeling, red skin rash  · Blue lips, fingernails, or skin, trouble breathing, chest pain  · Blurry or double vision  · Fever, chills, cough, sore throat, body aches  · Muscle pain, tenderness, or weakness, general feeling of illness  · Rapid weight gain, swelling in your hands, ankles, or feet  · Severe dizziness or drowsiness  · Sudden mood changes, unusual moods or behavior, including extreme happiness or depression, thoughts or attempts of killing oneself  · Swelling in your throat, head, or neck  · Uneven heartbeat  · Unusual bleeding, bruising, or weakness  If you notice these less serious side effects, talk with your doctor:   · Confusion, trouble concentrating  · Constipation  · Dry mouth  If you notice other side effects that you think are caused by this medicine, tell your doctor  Call your doctor for medical advice about side effects  You may report side effects to FDA at 7-699-FDA-9667  © Copyright [employer] 2021 Information is for End User's use only and may not be sold, redistributed or otherwise used for commercial purposes  The above information is an  only  It is not intended as medical advice for individual conditions or treatments  Talk to your doctor, nurse or pharmacist before following any medical regimen to see if it is safe and effective for you

## 2021-09-02 ENCOUNTER — TELEPHONE (OUTPATIENT)
Dept: PAIN MEDICINE | Facility: CLINIC | Age: 51
End: 2021-09-02

## 2021-09-02 NOTE — TELEPHONE ENCOUNTER
Patient called in for a refill of :    Pt has [facility] on 10/20 - needed to r/s due to poss covid exposure      Name of medication: pregabalin (LYRICA) 100 mg capsule [[id number]]     Order Details    Frequency: Take 1 PO HS x 4 days, then 1 PO BID x 4 days, then 1 PO TID x 4 days, then 1 in AM and afternoon and 2 PO HS      How many left:  Pharmacy: [facility] on file   Best call back #  [phone]  Pt aware it can take 24-48 hrs to process refills- thank you

## 2021-10-20 ENCOUNTER — OFFICE VISIT (OUTPATIENT)
Dept: PAIN MEDICINE | Facility: CLINIC | Age: 51
End: 2021-10-20
Payer: COMMERCIAL

## 2021-10-20 VITALS
HEART RATE: 93 BPM | TEMPERATURE: 97.8 F | DIASTOLIC BLOOD PRESSURE: 82 MMHG | WEIGHT: 213 LBS | SYSTOLIC BLOOD PRESSURE: 128 MMHG | BODY MASS INDEX: 34.23 KG/M2 | HEIGHT: 66 IN

## 2021-10-20 DIAGNOSIS — G89.29 CHRONIC PAIN OF BOTH LOWER EXTREMITIES: ICD-10-CM

## 2021-10-20 DIAGNOSIS — M79.673 CHRONIC FOOT PAIN, UNSPECIFIED LATERALITY: ICD-10-CM

## 2021-10-20 DIAGNOSIS — M79.2 NEUROPATHIC PAIN OF FOOT, UNSPECIFIED LATERALITY: ICD-10-CM

## 2021-10-20 DIAGNOSIS — M79.605 CHRONIC PAIN OF BOTH LOWER EXTREMITIES: ICD-10-CM

## 2021-10-20 DIAGNOSIS — E11.42 DIABETIC POLYNEUROPATHY ASSOCIATED WITH TYPE 2 DIABETES MELLITUS (HCC): ICD-10-CM

## 2021-10-20 DIAGNOSIS — G89.4 CHRONIC PAIN SYNDROME: Primary | ICD-10-CM

## 2021-10-20 DIAGNOSIS — M79.604 CHRONIC PAIN OF BOTH LOWER EXTREMITIES: ICD-10-CM

## 2021-10-20 DIAGNOSIS — G89.29 CHRONIC FOOT PAIN, UNSPECIFIED LATERALITY: ICD-10-CM

## 2021-10-20 PROCEDURE — 99214 OFFICE O/P EST MOD 30 MIN: CPT | Performed by: NURSE PRACTITIONER

## 2021-10-20 RX ORDER — PREGABALIN 100 MG/1
CAPSULE ORAL
Qty: 120 CAPSULE | Refills: 1 | Status: SHIPPED | OUTPATIENT
Start: 2021-10-20 | End: 2021-10-20 | Stop reason: SDUPTHER

## 2021-10-20 RX ORDER — PREGABALIN 100 MG/1
CAPSULE ORAL
Qty: 120 CAPSULE | Refills: 1 | Status: SHIPPED | OUTPATIENT
Start: 2021-10-20 | End: 2021-11-10 | Stop reason: SDUPTHER

## 2021-11-10 ENCOUNTER — TELEPHONE (OUTPATIENT)
Dept: PAIN MEDICINE | Facility: CLINIC | Age: 51
End: 2021-11-10

## 2021-11-10 DIAGNOSIS — M79.604 CHRONIC PAIN OF BOTH LOWER EXTREMITIES: ICD-10-CM

## 2021-11-10 DIAGNOSIS — M79.2 NEUROPATHIC PAIN OF FOOT, UNSPECIFIED LATERALITY: ICD-10-CM

## 2021-11-10 DIAGNOSIS — M79.605 CHRONIC PAIN OF BOTH LOWER EXTREMITIES: ICD-10-CM

## 2021-11-10 DIAGNOSIS — M79.673 CHRONIC FOOT PAIN, UNSPECIFIED LATERALITY: ICD-10-CM

## 2021-11-10 DIAGNOSIS — E11.42 DIABETIC POLYNEUROPATHY ASSOCIATED WITH TYPE 2 DIABETES MELLITUS (HCC): ICD-10-CM

## 2021-11-10 DIAGNOSIS — G89.29 CHRONIC PAIN OF BOTH LOWER EXTREMITIES: ICD-10-CM

## 2021-11-10 DIAGNOSIS — G89.29 CHRONIC FOOT PAIN, UNSPECIFIED LATERALITY: ICD-10-CM

## 2021-11-10 DIAGNOSIS — G89.4 CHRONIC PAIN SYNDROME: ICD-10-CM

## 2021-11-10 RX ORDER — PREGABALIN 100 MG/1
CAPSULE ORAL
Qty: 120 CAPSULE | Refills: 1
Start: 2021-11-10 | End: 2021-12-14 | Stop reason: SDUPTHER

## 2021-11-10 RX ORDER — BACLOFEN 10 MG/1
TABLET ORAL
Qty: 60 TABLET | Refills: 1 | Status: SHIPPED | OUTPATIENT
Start: 2021-11-10 | End: 2021-12-14 | Stop reason: SDUPTHER

## 2021-12-14 ENCOUNTER — OFFICE VISIT (OUTPATIENT)
Dept: PAIN MEDICINE | Facility: CLINIC | Age: 51
End: 2021-12-14
Payer: COMMERCIAL

## 2021-12-14 VITALS
HEIGHT: 66 IN | SYSTOLIC BLOOD PRESSURE: 138 MMHG | TEMPERATURE: 97.6 F | WEIGHT: 216 LBS | BODY MASS INDEX: 34.72 KG/M2 | HEART RATE: 89 BPM | DIASTOLIC BLOOD PRESSURE: 88 MMHG

## 2021-12-14 DIAGNOSIS — G89.4 CHRONIC PAIN SYNDROME: Primary | ICD-10-CM

## 2021-12-14 DIAGNOSIS — M79.604 CHRONIC PAIN OF BOTH LOWER EXTREMITIES: ICD-10-CM

## 2021-12-14 DIAGNOSIS — E11.42 DIABETIC POLYNEUROPATHY ASSOCIATED WITH TYPE 2 DIABETES MELLITUS (HCC): ICD-10-CM

## 2021-12-14 DIAGNOSIS — M79.2 NEUROPATHIC PAIN OF FOOT, UNSPECIFIED LATERALITY: ICD-10-CM

## 2021-12-14 DIAGNOSIS — M79.673 CHRONIC FOOT PAIN, UNSPECIFIED LATERALITY: ICD-10-CM

## 2021-12-14 DIAGNOSIS — M79.605 CHRONIC PAIN OF BOTH LOWER EXTREMITIES: ICD-10-CM

## 2021-12-14 DIAGNOSIS — G89.29 CHRONIC FOOT PAIN, UNSPECIFIED LATERALITY: ICD-10-CM

## 2021-12-14 DIAGNOSIS — G89.29 CHRONIC PAIN OF BOTH LOWER EXTREMITIES: ICD-10-CM

## 2021-12-14 PROCEDURE — 99214 OFFICE O/P EST MOD 30 MIN: CPT | Performed by: NURSE PRACTITIONER

## 2021-12-14 RX ORDER — PREGABALIN 100 MG/1
CAPSULE ORAL
Qty: 90 CAPSULE | Refills: 2 | Status: SHIPPED | OUTPATIENT
Start: 2021-12-14 | End: 2022-03-29 | Stop reason: SDUPTHER

## 2021-12-14 RX ORDER — BACLOFEN 10 MG/1
TABLET ORAL
Qty: 90 TABLET | Refills: 2 | Status: SHIPPED | OUTPATIENT
Start: 2021-12-14 | End: 2022-03-29 | Stop reason: SDUPTHER

## 2022-03-08 ENCOUNTER — TELEPHONE (OUTPATIENT)
Dept: PAIN MEDICINE | Facility: CLINIC | Age: 52
End: 2022-03-08

## 2022-03-29 ENCOUNTER — OFFICE VISIT (OUTPATIENT)
Dept: PAIN MEDICINE | Facility: CLINIC | Age: 52
End: 2022-03-29
Payer: COMMERCIAL

## 2022-03-29 VITALS
HEIGHT: 66 IN | WEIGHT: 220 LBS | DIASTOLIC BLOOD PRESSURE: 82 MMHG | BODY MASS INDEX: 35.36 KG/M2 | SYSTOLIC BLOOD PRESSURE: 128 MMHG | HEART RATE: 96 BPM | TEMPERATURE: 97.4 F

## 2022-03-29 DIAGNOSIS — M79.2 NEUROPATHIC PAIN OF FOOT, UNSPECIFIED LATERALITY: ICD-10-CM

## 2022-03-29 DIAGNOSIS — M79.604 CHRONIC PAIN OF BOTH LOWER EXTREMITIES: ICD-10-CM

## 2022-03-29 DIAGNOSIS — E11.42 DIABETIC POLYNEUROPATHY ASSOCIATED WITH TYPE 2 DIABETES MELLITUS (HCC): ICD-10-CM

## 2022-03-29 DIAGNOSIS — G89.29 CHRONIC PAIN OF BOTH LOWER EXTREMITIES: ICD-10-CM

## 2022-03-29 DIAGNOSIS — M79.673 CHRONIC FOOT PAIN, UNSPECIFIED LATERALITY: ICD-10-CM

## 2022-03-29 DIAGNOSIS — G89.29 CHRONIC FOOT PAIN, UNSPECIFIED LATERALITY: ICD-10-CM

## 2022-03-29 DIAGNOSIS — M79.605 CHRONIC PAIN OF BOTH LOWER EXTREMITIES: ICD-10-CM

## 2022-03-29 DIAGNOSIS — G89.4 CHRONIC PAIN SYNDROME: Primary | ICD-10-CM

## 2022-03-29 PROCEDURE — 99214 OFFICE O/P EST MOD 30 MIN: CPT | Performed by: NURSE PRACTITIONER

## 2022-03-29 RX ORDER — BACLOFEN 10 MG/1
TABLET ORAL
Qty: 120 TABLET | Refills: 3 | Status: SHIPPED | OUTPATIENT
Start: 2022-03-29 | End: 2022-07-06 | Stop reason: SDUPTHER

## 2022-03-29 RX ORDER — PREGABALIN 150 MG/1
CAPSULE ORAL
Qty: 90 CAPSULE | Refills: 3 | Status: SHIPPED | OUTPATIENT
Start: 2022-03-29 | End: 2022-07-06 | Stop reason: SDUPTHER

## 2022-03-29 NOTE — PROGRESS NOTES
Assessment:  1  Chronic pain syndrome    2  Chronic pain of both lower extremities    3  Chronic foot pain, unspecified laterality    4  Neuropathic pain of foot, unspecified laterality    5  Diabetic polyneuropathy associated with type 2 diabetes mellitus ([address] )        Plan:  While the patient was in the office today, I did have a thorough conversation with the patient regarding their chronic pain syndrome, symptoms, medication regimen, and treatment plan  I discussed with the patient at this point time with regards to the Lyrica since she is just on the cusp of the therapeutic dose, with some improvement, without side effects, I feel would be beneficial to further titrate the Lyrica to 450 mg daily by putting her 150 mg t i d  which would help spread the medication out better and hopefully provide better overall relief throughout the day  To help with the myofascial component we will also increase the baclofen to 4 pills a day as well  I advised the patient that if they experience any side effects or issues with the changes in their medication regiment, they should give our office a call to discuss  I also advised the patient not to drive or operate machinery until they see how the changes in the medication regimen affects them  The patient was agreeable and verbalized an understanding  I thoroughly encouraged her to continue to follow-up with endocrinology and continue to work on managing her overall blood sugars  The patient was agreeable and verbalized an understanding  The patient will follow-up in 4 months for medication prescription refill and reevaluation  The patient was advised to contact the office should their symptoms worsen in the interim  The patient was agreeable and verbalized an understanding  History of Present Illness:     The patient is a 45 y o  female last seen on 12/14/2021 who presents for a follow up office visit in regards to chronic pain syndrome, as the patient's pain has been ongoing for greater than a year, secondary to diabetic neuropathy of the lower extremities  The patient currently reports that since her last office visit she does feel there has been some improvement in her pain symptoms as she is back on the Lyrica 300 mg a day and although the relief is not as much as she would like it is still better than what she was receiving with the gabapentin  She also reports that she feels the baclofen has been helpful, but does feel she could use a dose somewhere in the middle the day as well  She does report that since her last office visit she has been seeing a new endocrinologist who is definitely starting to help her get better control of her diabetes and blood sugars as well  The patient presents today for regular medication follow-up visit  Current pain medications includes:  Lyrica 300 mg daily and baclofen 10 mg in the morning and 20 mg at bedtime  The patient reports that this regimen is providing 20% pain relief  The patient is reporting no side effects from this pain medication regimen  I have personally reviewed and/or updated the patient's past medical history, past surgical history, family history, social history, current medications, allergies, and vital signs today  Review of Systems:    Review of Systems   Respiratory: Negative for shortness of breath  Cardiovascular: Negative for chest pain  Gastrointestinal: Negative for constipation, diarrhea, nausea and vomiting  Musculoskeletal: Negative for arthralgias, gait problem, joint swelling and myalgias  Skin: Negative for rash  Neurological: Negative for dizziness, seizures and weakness  All other systems reviewed and are negative  Past Medical History:   Diagnosis Date    Asthma     Diabetes mellitus ([facility] [address] )     History of angina     Hyperlipidemia     Hypertension        Past Surgical History:   Procedure Laterality Date    BLADDER SURGERY         History reviewed  No pertinent family history  Social History     Occupational History    Not on file   Tobacco Use    Smoking status: Former Smoker    Smokeless tobacco: Never Used   Vaping Use    Vaping Use: Never used   Substance and Sexual Activity    Alcohol use: Never    Drug use: Never    Sexual activity: Not on file         Current Outpatient Medications:     atorvastatin (LIPITOR) 40 mg tablet, , Disp: , Rfl:     B-D UF III MINI PEN NEEDLES 31G X 5 MM MISC, , Disp: , Rfl:     baclofen 10 mg tablet, Take 1 PO in AM and afternoon and 2 PO HS , Disp: 120 tablet, Rfl: 3    Continuous Blood Gluc Sensor (FreeStyle Mina 14 Day Sensor) MISC, Use as directed, Disp: , Rfl:     fluconazole (DIFLUCAN) 150 mg tablet, , Disp: , Rfl:     insulin lispro (HumaLOG) 100 units/mL injection pen, , Disp: , Rfl:     Lantus SoloStar 100 units/mL injection pen, , Disp: , Rfl:     lisinopril-hydrochlorothiazide (PRINZIDE,ZESTORETIC) 20-25 MG per tablet, , Disp: , Rfl:     pregabalin (LYRICA) 150 mg capsule, Take PO TID , Disp: 90 capsule, Rfl: 3    No Known Allergies    Physical Exam:    /82 (BP Location: Left arm, Patient Position: Sitting, Cuff Size: Standard)   Pulse 96   Temp (!) 97 4 °F (36 3 °C)   Ht 5' 6" (1 676 m)   Wt 99 8 kg (220 lb)   BMI 35 51 kg/m²     Constitutional:normal, well developed, well nourished, alert, in no distress and non-toxic and no overt pain behavior  and overweight  Eyes:anicteric  HEENT:grossly intact  Neck:supple, symmetric, trachea midline and no masses   Pulmonary:even and unlabored  Cardiovascular:No edema or pitting edema present  Skin:Normal without rashes or lesions and well hydrated  Psychiatric:Mood and affect appropriate  Neurologic:Cranial Nerves II-XII grossly intact  Musculoskeletal:The patient's gait is slightly antalgic, but steady without the use of any assistive devices        Imaging  No orders to display         No orders of the defined types were placed in this encounter

## 2022-05-03 ENCOUNTER — PATIENT MESSAGE (OUTPATIENT)
Dept: PAIN MEDICINE | Facility: CLINIC | Age: 52
End: 2022-05-03

## 2022-05-04 ENCOUNTER — OFFICE VISIT (OUTPATIENT)
Dept: PODIATRY | Facility: CLINIC | Age: 52
End: 2022-05-04
Payer: COMMERCIAL

## 2022-05-04 VITALS
HEIGHT: 66 IN | WEIGHT: 220 LBS | HEART RATE: 83 BPM | DIASTOLIC BLOOD PRESSURE: 84 MMHG | BODY MASS INDEX: 35.36 KG/M2 | SYSTOLIC BLOOD PRESSURE: 123 MMHG

## 2022-05-04 DIAGNOSIS — M79.2 NEUROPATHIC PAIN OF FOOT, UNSPECIFIED LATERALITY: ICD-10-CM

## 2022-05-04 DIAGNOSIS — M20.60 ACQUIRED DEFORMITY OF TOE, UNSPECIFIED LATERALITY: ICD-10-CM

## 2022-05-04 DIAGNOSIS — E11.40 TYPE 2 DIABETES MELLITUS WITH DIABETIC NEUROPATHY, WITH LONG-TERM CURRENT USE OF INSULIN (HCC): Primary | ICD-10-CM

## 2022-05-04 DIAGNOSIS — B35.1 ONYCHOMYCOSIS: ICD-10-CM

## 2022-05-04 DIAGNOSIS — Z79.4 TYPE 2 DIABETES MELLITUS WITH DIABETIC NEUROPATHY, WITH LONG-TERM CURRENT USE OF INSULIN (HCC): Primary | ICD-10-CM

## 2022-05-04 DIAGNOSIS — L85.1 ACQUIRED KERATODERMA: ICD-10-CM

## 2022-05-04 PROCEDURE — 99214 OFFICE O/P EST MOD 30 MIN: CPT | Performed by: PODIATRIST

## 2022-05-04 RX ORDER — DULAGLUTIDE 0.75 MG/.5ML
INJECTION, SOLUTION SUBCUTANEOUS WEEKLY
COMMUNITY
Start: 2022-01-26

## 2022-05-04 RX ORDER — TERBINAFINE HYDROCHLORIDE 250 MG/1
250 TABLET ORAL DAILY
Qty: 14 TABLET | Refills: 0 | Status: SHIPPED | OUTPATIENT
Start: 2022-05-04 | End: 2022-05-18

## 2022-05-04 RX ORDER — ALBUTEROL SULFATE 90 UG/1
AEROSOL, METERED RESPIRATORY (INHALATION)
COMMUNITY
Start: 2022-04-03

## 2022-05-04 NOTE — PROGRESS NOTES
PATIENT:  Herminia Pozo    1970      ASSESSMENT:     1  Type 2 diabetes mellitus with diabetic neuropathy, with long-term current use of insulin (HCC)  Diabetic Shoe    Diabetic Shoe Inserts   2  Neuropathic pain of foot, unspecified laterality     3  Acquired deformity of toe, unspecified laterality  Diabetic Shoe    Diabetic Shoe Inserts   4  Onychomycosis  ciclopirox (PENLAC) 8 % solution    terbinafine (LamISIL) 250 mg tablet   5  Acquired keratoderma  urea (CARMOL) 30 % cream         PLAN:  1  Patient was counseled on the condition and diagnosis  2   Educated disease prevention and risks related to diabetes  3   Educated proper daily foot care and exam   Instructed proper skin care / protection and footwear  Instructed to identify any signs of infection and related foot problem  4   She has painful neuropathy in her feet  Reviewed/ discussed blood work  The last HbA1c was 11 4  Instructed her to control tight management of BS to decreased symptoms  Continue Lyrica and pain management follow-up  5  Referred her for DM shoes and inserts  6  Pulse dose Lamisil and Penlac for nail fungus  Discussed possible side effects and risks  7  Rx: Urea cream for fissures  8  She has high risk diabetic foot and will return in 3 months      Subjective:          HPI  The patient presents for diabetic foot exam   She has multiple complaint in her feet including painful neuropathy, nail fungus, and dry skin in her great toes  She has shooting, throbbing, and pins/needles  She also has numbness and cold sensation  She is seeing pain management and started Lyrica  he blood glucose is still high  She also has skin crack around right great toe  She tried different cream without resolving it  She also complaint of discoloration of toenails        The following portions of the patient's history were reviewed and updated as appropriate: allergies, current medications, past family history, past medical history, past social history, past surgical history and problem list   All pertinent labs and images were reviewed  Past Medical History  Past Medical History:   Diagnosis Date    Asthma     Diabetes mellitus ([address] )     History of angina     Hyperlipidemia     Hypertension        Past Surgical History  Past Surgical History:   Procedure Laterality Date    BLADDER SURGERY          Allergies:  Patient has no known allergies  Medications:  Current Outpatient Medications   Medication Sig Dispense Refill    albuterol (PROVENTIL HFA,VENTOLIN HFA) 90 mcg/act inhaler INHALE 1 PUFF BY MOUTH EVERY 4 HOURS AS NEEDED      atorvastatin (LIPITOR) 40 mg tablet       B-D UF III MINI PEN NEEDLES 31G X 5 MM MISC       baclofen 10 mg tablet Take 1 PO in AM and afternoon and 2 PO HS  120 tablet 3    Continuous Blood Gluc Sensor (FreeStyle Mina 14 Day Sensor) MISC Use as directed      insulin lispro (HumaLOG) 100 units/mL injection pen       Lantus SoloStar 100 units/mL injection pen       lisinopril-hydrochlorothiazide (PRINZIDE,ZESTORETIC) 20-25 MG per tablet       pregabalin (LYRICA) 150 mg capsule Take PO TID  90 capsule 3    Trulicity 5 28 PL/7 1TH SOPN Inject under the skin once a week As directed      ciclopirox (PENLAC) 8 % solution Apply topically daily at bedtime 6 6 mL 5    terbinafine (LamISIL) 250 mg tablet Take 1 tablet (250 mg total) by mouth daily for 14 days 14 tablet 0    urea (CARMOL) 30 % cream Apply topically 3 (three) times a day 227 g 0     No current facility-administered medications for this visit  Social History:  Social History     Socioeconomic History    Marital status:       Spouse name: None    Number of children: None    Years of education: None    Highest education level: None   Occupational History    None   Tobacco Use    Smoking status: Former Smoker    Smokeless tobacco: Never Used   Vaping Use    Vaping Use: Never used   Substance and Sexual Activity    Alcohol use: Never    Drug use: Never    Sexual activity: None   Other Topics Concern    None   Social History Narrative    None     Social Determinants of Health     Financial Resource Strain: Not on file   Food Insecurity: Not on file   Transportation Needs: Not on file   Physical Activity: Not on file   Stress: Not on file   Social Connections: Not on file   Intimate Partner Violence: Not on file   Housing Stability: Not on file        Review of Systems   Constitutional: Negative for appetite change, chills and fever  HENT: Negative for sore throat  Respiratory: Negative for cough and shortness of breath  Cardiovascular: Negative for chest pain  Gastrointestinal: Negative for diarrhea, nausea and vomiting  Musculoskeletal: Negative for joint swelling  Skin: Negative for rash and wound  Neurological: Positive for numbness  Negative for weakness  Hematological: Does not bruise/bleed easily  Psychiatric/Behavioral: Negative for behavioral problems and confusion  Objective:      /84   Pulse 83   Ht 5' 6" (1 676 m)   Wt 99 8 kg (220 lb)   BMI 35 51 kg/m²          Physical Exam  Vitals reviewed  Constitutional:       General: She is not in acute distress  Appearance: Normal appearance  She is not ill-appearing  HENT:      Head: Normocephalic and atraumatic  Eyes:      Extraocular Movements: Extraocular movements intact  Cardiovascular:      Rate and Rhythm: Normal rate and regular rhythm  Pulses: Pulses are weak  Dorsalis pedis pulses are 1+ on the right side and 1+ on the left side  Posterior tibial pulses are 0 on the right side and 0 on the left side  Comments: No ischemia  Pulmonary:      Effort: Pulmonary effort is normal  No respiratory distress  Musculoskeletal:         General: No swelling, tenderness, deformity or signs of injury  Cervical back: Normal range of motion and neck supple        Right lower leg: No edema  Left lower leg: No edema  Right foot: No Charcot foot or foot drop  Left foot: No Charcot foot or foot drop  Comments: Mild hammertoes  Feet:      Right foot:      Protective Sensation: 10 sites tested  0 sites sensed  Skin integrity: Dry skin present  No ulcer or erythema  Left foot:      Protective Sensation: 10 sites tested  0 sites sensed  Skin integrity: Dry skin present  No ulcer or erythema  Skin:     General: Skin is warm and dry  Capillary Refill: Capillary refill takes less than 2 seconds  Coloration: Skin is not cyanotic or mottled  Findings: No abscess, ecchymosis, erythema, rash or wound  Nails: There is no clubbing  Comments: Mild fissure at the tip of right great toe  Mild discoloration and thickening of distal nail plates  Neurological:      General: No focal deficit present  Mental Status: She is alert and oriented to person, place, and time  Cranial Nerves: No cranial nerve deficit  Sensory: Sensory deficit present  Motor: No weakness  Coordination: Coordination normal    Psychiatric:         Mood and Affect: Mood normal          Behavior: Behavior normal          Thought Content: Thought content normal          Judgment: Judgment normal              Diabetic Foot Exam    Patient's shoes and socks removed  Right Foot/Ankle   Right Foot Inspection  Skin Exam: skin intact and dry skin  No erythema, no pre-ulcer and no ulcer  Toe Exam: No swelling, no tenderness, erythema and  no right toe deformity    Sensory   Vibration: absent  Proprioception: intact  Monofilament testing: absent    Vascular  Capillary refills: < 3 seconds  The right DP pulse is 1+  The right PT pulse is 0  Left Foot/Ankle  Left Foot Inspection  Skin Exam: skin intact and dry skin  No erythema, no pre-ulcer and no ulcer  Toe Exam: No swelling, no tenderness, no erythema and no left toe deformity       Sensory Vibration: absent  Proprioception: intact  Monofilament testing: absent    Vascular  Capillary refills: < 3 seconds  The left DP pulse is 1+  The left PT pulse is 0       Assign Risk Category  No deformity present  Loss of protective sensation  Weak pulses  Risk: 2

## 2022-05-05 NOTE — TELEPHONE ENCOUNTER
From: Diana Bloom  To: Derwin Hammans, CRNP  Sent: 5/3/2022 4:40 PM EDT  Subject: Medication and pain     Over last 2 days I feel like meds arent working as well as before adding the pill in afternoon for my knee does seem to be working but the pain in my feet is overwhelming not sure why is this normal for pain to get worse again? ? Is there something different we could try it is preventing me from sleeping again and disrupting my day with this horrible stabbing pain

## 2022-07-06 ENCOUNTER — TELEPHONE (OUTPATIENT)
Dept: PAIN MEDICINE | Facility: CLINIC | Age: 52
End: 2022-07-06

## 2022-07-06 DIAGNOSIS — M79.605 CHRONIC PAIN OF BOTH LOWER EXTREMITIES: ICD-10-CM

## 2022-07-06 DIAGNOSIS — G89.29 CHRONIC FOOT PAIN, UNSPECIFIED LATERALITY: ICD-10-CM

## 2022-07-06 DIAGNOSIS — M79.673 CHRONIC FOOT PAIN, UNSPECIFIED LATERALITY: ICD-10-CM

## 2022-07-06 DIAGNOSIS — M79.2 NEUROPATHIC PAIN OF FOOT, UNSPECIFIED LATERALITY: ICD-10-CM

## 2022-07-06 DIAGNOSIS — E11.42 DIABETIC POLYNEUROPATHY ASSOCIATED WITH TYPE 2 DIABETES MELLITUS (HCC): ICD-10-CM

## 2022-07-06 DIAGNOSIS — G89.4 CHRONIC PAIN SYNDROME: ICD-10-CM

## 2022-07-06 DIAGNOSIS — M79.604 CHRONIC PAIN OF BOTH LOWER EXTREMITIES: ICD-10-CM

## 2022-07-06 DIAGNOSIS — G89.29 CHRONIC PAIN OF BOTH LOWER EXTREMITIES: ICD-10-CM

## 2022-07-06 RX ORDER — PREGABALIN 150 MG/1
CAPSULE ORAL
Qty: 90 CAPSULE | Refills: 1 | Status: SHIPPED | OUTPATIENT
Start: 2022-07-06 | End: 2022-08-31 | Stop reason: SDUPTHER

## 2022-07-06 RX ORDER — BACLOFEN 10 MG/1
TABLET ORAL
Qty: 120 TABLET | Refills: 1 | Status: SHIPPED | OUTPATIENT
Start: 2022-07-06 | End: 2022-08-31 | Stop reason: SDUPTHER

## 2022-07-06 NOTE — TELEPHONE ENCOUNTER
Med refill   Name of medication:baclofen 10 mg tablet       Frequency:Take 1 PO in AM and afternoon and 2 PO HS  How many tablets left:will be out by 8/31  Pharmacy: [facility] [id number]   [address] [place], [address]   Phone:  [phone]  Fax:  [phone]   KADI #:  --  Best call back #[phone]      Med refill   Name of medication:pregabalin (LYRICA) 150 mg capsule       Frequency:Take PO TID    How many tablets left: will be out by 8/31    Patient called to reschedule office visit will be on vacation from 7/17 to 7/24 next available will be 8/31 she will be out of medication     Please advise

## 2022-07-06 NOTE — TELEPHONE ENCOUNTER
I sent a 30 day script for the Baclofen and Lyrica, with a refill on each to get her to her f/u OV as scheduled in August  Thank you

## 2022-07-06 NOTE — TELEPHONE ENCOUNTER
S/w pt, confirmed baclofen 10 mg as prescribed, 4 pills / day and lyrica 150 mg as prescribed, 3 pills / day  Per pt, + relief with both, no se's  Pt stated that her family booked a last-minute trip to [place] and they are including her  Unfortunately - she will not make her fu appt  Rescheduled for 8/31  Advised pt, the writer will d/w DG  Anticipate refills will be sent to the pharmacy for pu  This office will cb only if there is a question or change in the plan as discussed  Pt verbalized understanding and appreciation

## 2022-08-31 ENCOUNTER — OFFICE VISIT (OUTPATIENT)
Dept: PAIN MEDICINE | Facility: CLINIC | Age: 52
End: 2022-08-31
Payer: COMMERCIAL

## 2022-08-31 VITALS
HEIGHT: 66 IN | WEIGHT: 208 LBS | SYSTOLIC BLOOD PRESSURE: 128 MMHG | HEART RATE: 93 BPM | BODY MASS INDEX: 33.43 KG/M2 | DIASTOLIC BLOOD PRESSURE: 82 MMHG | TEMPERATURE: 97.8 F

## 2022-08-31 DIAGNOSIS — G89.29 CHRONIC FOOT PAIN, UNSPECIFIED LATERALITY: ICD-10-CM

## 2022-08-31 DIAGNOSIS — M79.2 NEUROPATHIC PAIN OF FOOT, UNSPECIFIED LATERALITY: ICD-10-CM

## 2022-08-31 DIAGNOSIS — M79.673 CHRONIC FOOT PAIN, UNSPECIFIED LATERALITY: ICD-10-CM

## 2022-08-31 DIAGNOSIS — G89.29 CHRONIC PAIN OF BOTH LOWER EXTREMITIES: ICD-10-CM

## 2022-08-31 DIAGNOSIS — M79.604 CHRONIC PAIN OF BOTH LOWER EXTREMITIES: ICD-10-CM

## 2022-08-31 DIAGNOSIS — G89.4 CHRONIC PAIN SYNDROME: Primary | ICD-10-CM

## 2022-08-31 DIAGNOSIS — E11.42 DIABETIC POLYNEUROPATHY ASSOCIATED WITH TYPE 2 DIABETES MELLITUS (HCC): ICD-10-CM

## 2022-08-31 DIAGNOSIS — M79.605 CHRONIC PAIN OF BOTH LOWER EXTREMITIES: ICD-10-CM

## 2022-08-31 PROCEDURE — 99214 OFFICE O/P EST MOD 30 MIN: CPT | Performed by: NURSE PRACTITIONER

## 2022-08-31 RX ORDER — DAPAGLIFLOZIN 10 MG/1
TABLET, FILM COATED ORAL
COMMUNITY
Start: 2022-06-10

## 2022-08-31 RX ORDER — PREGABALIN 150 MG/1
CAPSULE ORAL
Qty: 90 CAPSULE | Refills: 3 | Status: SHIPPED | OUTPATIENT
Start: 2022-08-31

## 2022-08-31 RX ORDER — BACLOFEN 10 MG/1
TABLET ORAL
Qty: 120 TABLET | Refills: 3 | Status: SHIPPED | OUTPATIENT
Start: 2022-08-31

## 2022-08-31 NOTE — PROGRESS NOTES
Assessment:  1  Chronic pain syndrome    2  Chronic pain of both lower extremities    3  Chronic foot pain, unspecified laterality    4  Neuropathic pain of foot, unspecified laterality    5  Diabetic polyneuropathy associated with type 2 diabetes mellitus ([address] )        Plan:  While the patient was in the office today, I did have a thorough conversation with the patient regarding their chronic pain syndrome, symptoms, medication regimen, and treatment plan  I discussed with the patient at this point time hopefully some of her symptoms and numbness from the TIA may improved gradually over time as I agree that I am not fully convinced it is all part of her neuropathy, especially since it only started after that TIA and she should follow up with Neurology and the further testing and evaluation as scheduled and as always continue to work on keeping her blood sugars as controlled and manageable as possible  The patient was agreeable and verbalized an understanding  With regards to her medication regimen, for now, I discussed the patient that we should just continue the current medication regimen as prescribed for the next 4 months and hopefully she will see some slow and steady improvement overall and get back to her baseline where the pain was more manageable  The patient was agreeable and verbalized an understanding  The patient will follow-up in 4 months for medication prescription refill and reevaluation  The patient was advised to contact the office should their symptoms worsen in the interim  The patient was agreeable and verbalized an understanding  History of Present Illness: The patient is a 45 y o  female last seen on 3/29/2022 who presents for a follow up office visit in regards to chronic pain syndrome, as the patient's pain has been ongoing for greater than a year, secondary to diabetic neuropathy    The patient currently reports that since her last office visit overall her pain symptoms have worsened, especially over the past 3-4 weeks  The patient reports that approximately 4 weeks ago she went to [place] [id number] on vacation and did fine and then on her way home she was not feeling well and then just figured maybe she had overdone it with her activity level and rested for a day and then still did not feel well as she started with some facial numbness and worsening numbness of her hands which is not normal for her  It does appear that she had a possible TIA and although some of the numbness is improved, she still left with some left-sided facial and mouth numbness and the new onset 3rd through 5th bilateral upper extremity digit numbness, again, which the patient reports is definitely new as previously she only had numbness in her forearms  The patient reports that she is not sleeping well and that sometimes her medication is helpful and other times it is not but she is going to be following up with Neurology as they doing some updated imaging of her brain  She reports that her blood sugars have also been more on manageable and under control will since her TIA  The patient presents today for regular medication follow-up visit  Current pain medications includes:  Baclofen 10 mg q i d  and Lyrica 150 mg t i d , which was increased at her last office visit  The patient reports that this regimen is providing 20% pain relief  The patient is reporting no side effects from this pain medication regimen  I have personally reviewed and/or updated the patient's past medical history, past surgical history, family history, social history, current medications, allergies, and vital signs today  Review of Systems:    Review of Systems   Respiratory: Negative for shortness of breath  Cardiovascular: Negative for chest pain  Gastrointestinal: Negative for constipation, diarrhea, nausea and vomiting  Musculoskeletal: Positive for gait problem and joint swelling (joint stiffness)  Negative for arthralgias and myalgias  Skin: Negative for rash  Neurological: Positive for dizziness and weakness  Negative for seizures  All other systems reviewed and are negative  Past Medical History:   Diagnosis Date    Asthma     Diabetes mellitus ([address] )     History of angina     Hyperlipidemia     Hypertension        Past Surgical History:   Procedure Laterality Date    BLADDER SURGERY         History reviewed  No pertinent family history      Social History     Occupational History    Not on file   Tobacco Use    Smoking status: Former Smoker    Smokeless tobacco: Never Used   Vaping Use    Vaping Use: Never used   Substance and Sexual Activity    Alcohol use: Never    Drug use: Never    Sexual activity: Not on file         Current Outpatient Medications:     albuterol (PROVENTIL HFA,VENTOLIN HFA) 90 mcg/act inhaler, INHALE 1 PUFF BY MOUTH EVERY 4 HOURS AS NEEDED, Disp: , Rfl:     atorvastatin (LIPITOR) 40 mg tablet, , Disp: , Rfl:     baclofen 10 mg tablet, Take 1 PO in AM and afternoon and 2 PO HS , Disp: 120 tablet, Rfl: 3    Farxiga 10 MG TABS, , Disp: , Rfl:     insulin lispro (HumaLOG) 100 units/mL injection pen, , Disp: , Rfl:     Lantus SoloStar 100 units/mL injection pen, , Disp: , Rfl:     lisinopril-hydrochlorothiazide (PRINZIDE,ZESTORETIC) 20-25 MG per tablet, , Disp: , Rfl:     pregabalin (LYRICA) 150 mg capsule, Take PO TID , Disp: 90 capsule, Rfl: 3    Trulicity 9 82 VX/4 7ZZ SOPN, Inject under the skin once a week As directed, Disp: , Rfl:     B-D UF III MINI PEN NEEDLES 31G X 5 MM MISC, , Disp: , Rfl:     ciclopirox (PENLAC) 8 % solution, Apply topically daily at bedtime, Disp: 6 6 mL, Rfl: 5    Continuous Blood Gluc Sensor (FreeStyle Mina 14 Day Sensor) MISC, Use as directed, Disp: , Rfl:     urea (CARMOL) 30 % cream, Apply topically 3 (three) times a day, Disp: 227 g, Rfl: 0    No Known Allergies    Physical Exam:    /82 (BP Location: Left arm, Patient Position: Sitting, Cuff Size: Standard)   Pulse 93   Temp 97 8 °F (36 6 °C)   Ht 5' 6" (1 676 m)   Wt 94 3 kg (208 lb)   BMI 33 57 kg/m²     Constitutional:normal, well developed, well nourished, alert, in no distress and non-toxic and no overt pain behavior  and overweight  Eyes:anicteric  HEENT:grossly intact  Neck:supple, symmetric, trachea midline and no masses   Pulmonary:even and unlabored  Cardiovascular:No edema or pitting edema present  Skin:Normal without rashes or lesions and well hydrated  Psychiatric:Mood and affect appropriate  Neurologic:Cranial Nerves II-XII grossly intact  Musculoskeletal:The patient's gait is slightly antalgic, painful at times, but steady without the use of any assistive devices  Imaging  No orders to display         No orders of the defined types were placed in this encounter

## 2022-09-14 ENCOUNTER — OFFICE VISIT (OUTPATIENT)
Dept: PODIATRY | Facility: CLINIC | Age: 52
End: 2022-09-14
Payer: COMMERCIAL

## 2022-09-14 ENCOUNTER — TELEPHONE (OUTPATIENT)
Dept: NEUROLOGY | Facility: CLINIC | Age: 52
End: 2022-09-14

## 2022-09-14 VITALS
BODY MASS INDEX: 33.43 KG/M2 | SYSTOLIC BLOOD PRESSURE: 114 MMHG | DIASTOLIC BLOOD PRESSURE: 78 MMHG | HEIGHT: 66 IN | WEIGHT: 208 LBS | HEART RATE: 90 BPM

## 2022-09-14 DIAGNOSIS — B35.1 ONYCHOMYCOSIS: ICD-10-CM

## 2022-09-14 DIAGNOSIS — Z79.4 TYPE 2 DIABETES MELLITUS WITH DIABETIC NEUROPATHY, WITH LONG-TERM CURRENT USE OF INSULIN (HCC): Primary | ICD-10-CM

## 2022-09-14 DIAGNOSIS — M79.2 NEUROPATHIC PAIN OF FOOT, UNSPECIFIED LATERALITY: ICD-10-CM

## 2022-09-14 DIAGNOSIS — G45.9 TIA (TRANSIENT ISCHEMIC ATTACK): ICD-10-CM

## 2022-09-14 DIAGNOSIS — E11.40 TYPE 2 DIABETES MELLITUS WITH DIABETIC NEUROPATHY, WITH LONG-TERM CURRENT USE OF INSULIN (HCC): Primary | ICD-10-CM

## 2022-09-14 DIAGNOSIS — R26.89 BALANCE DISORDER: ICD-10-CM

## 2022-09-14 DIAGNOSIS — L85.1 ACQUIRED KERATODERMA: ICD-10-CM

## 2022-09-14 PROCEDURE — 99214 OFFICE O/P EST MOD 30 MIN: CPT | Performed by: PODIATRIST

## 2022-09-14 RX ORDER — FLUCONAZOLE 150 MG/1
TABLET ORAL
COMMUNITY
Start: 2022-09-07 | End: 2022-09-14 | Stop reason: ALTCHOICE

## 2022-09-14 RX ORDER — INSULIN ASPART 100 [IU]/ML
INJECTION, SOLUTION INTRAVENOUS; SUBCUTANEOUS
COMMUNITY
Start: 2022-05-27

## 2022-09-14 RX ORDER — CIPROFLOXACIN 500 MG/1
500 TABLET, FILM COATED ORAL EVERY 12 HOURS
COMMUNITY
Start: 2022-08-17

## 2022-09-14 RX ORDER — TERBINAFINE HYDROCHLORIDE 250 MG/1
250 TABLET ORAL DAILY
Qty: 14 TABLET | Refills: 0 | Status: SHIPPED | OUTPATIENT
Start: 2022-09-14 | End: 2022-09-28

## 2022-09-14 NOTE — PROGRESS NOTES
PATIENT:  Kareem Summers    1970      ASSESSMENT:     1  Type 2 diabetes mellitus with diabetic neuropathy, with long-term current use of insulin [facility])  Ambulatory Referral to Neurology    Ambulatory referral to Physical Therapy   2  Neuropathic pain of foot, unspecified laterality  Ambulatory Referral to Neurology    Ambulatory referral to Physical Therapy   3  Onychomycosis  terbinafine (LamISIL) 250 mg tablet   4  Acquired keratoderma  urea (CARMOL) 30 % cream   5  TIA (transient ischemic attack)  Ambulatory Referral to Neurology   6  Balance disorder  Ambulatory referral to Physical Therapy         PLAN:  1  Patient was counseled on the condition and diagnosis  2   Educated disease prevention and risks related to diabetes  3   Educated proper daily foot care and exam   Instructed proper skin care / protection and footwear  Instructed to identify any signs of infection and related foot problem  4   Reviewed/ discussed blood work  The last HbA1c was 11 6  Instructed her to control tight management of BS to decreased symptoms  Continue Lyrica and pain management follow-up  5  Awaits DM shoes and inserts  6  Continue pulse dose Lamisil and Penlac for nail fungus  Discussed possible side effects and risks  7  Resent Urea cream for fissures  8  Referred her to neurology for painful neuropathy and aftercare for TIA  9  Sent her to PT for poor balance  10  RA in 3 months  Subjective:          HPI  The patient presents for diabetic foot exam   She has painful neuropathy in her feet  She had a mini-stroke and numbness and tingling in her left side face and fingers  Her balance seems to be poor and she has been falling  BS is still high  She has shooting, throbbing, and pins/needles  She also has numbness and cold sensation  She also has skin crack around right great toe  She did not get Urea cream   Tolerated Lamisil well with no side effects          The following portions of the patient's history were reviewed and updated as appropriate: allergies, current medications, past family history, past medical history, past social history, past surgical history and problem list   All pertinent labs and images were reviewed  Past Medical History  Past Medical History:   Diagnosis Date    Asthma     Diabetes mellitus ([address] )     History of angina     Hyperlipidemia     Hypertension        Past Surgical History  Past Surgical History:   Procedure Laterality Date    BLADDER SURGERY          Allergies:  Patient has no known allergies  Medications:  Current Outpatient Medications   Medication Sig Dispense Refill    albuterol (PROVENTIL HFA,VENTOLIN HFA) 90 mcg/act inhaler INHALE 1 PUFF BY MOUTH EVERY 4 HOURS AS NEEDED      atorvastatin (LIPITOR) 40 mg tablet       B-D UF III MINI PEN NEEDLES 31G X 5 MM MISC       baclofen 10 mg tablet Take 1 PO in AM and afternoon and 2 PO HS  120 tablet 3    ciclopirox (PENLAC) 8 % solution Apply topically daily at bedtime 6 6 mL 5    ciprofloxacin (CIPRO) 500 mg tablet Take 500 mg by mouth every 12 (twelve) hours      Continuous Blood Gluc Sensor (SoapBox Soapsyle Mina 14 Day Sensor) MISC Use as directed      Farxiga 10 MG TABS       insulin aspart (NovoLOG FlexPen) 100 UNIT/ML injection pen   See Instructions, take 70 units with meals, # 60 mL, 4 Refill(s), Pharmacy: [address], 05/27/22 13:08:00 EDT, Height/Length, cm, 97 976, 05/27/22 13:08:00 EDT, Weight Measured, kg      Lantus SoloStar 100 units/mL injection pen       lisinopril-hydrochlorothiazide (PRINZIDE,ZESTORETIC) 20-25 MG per tablet       pregabalin (LYRICA) 150 mg capsule Take PO TID   90 capsule 3    terbinafine (LamISIL) 250 mg tablet Take 1 tablet (250 mg total) by mouth daily for 14 days 14 tablet 0    Trulicity 9 52 FP/3 5TX SOPN Inject under the skin once a week As directed      urea (CARMOL) 30 % cream Apply topically 3 (three) times a day 227 g 1  insulin lispro (HumaLOG) 100 units/mL injection pen  (Patient not taking: Reported on 9/14/2022)       No current facility-administered medications for this visit  Social History:  Social History     Socioeconomic History    Marital status:      Spouse name: None    Number of children: None    Years of education: None    Highest education level: None   Occupational History    None   Tobacco Use    Smoking status: Former Smoker    Smokeless tobacco: Never Used   Vaping Use    Vaping Use: Never used   Substance and Sexual Activity    Alcohol use: Never    Drug use: Never    Sexual activity: None   Other Topics Concern    None   Social History Narrative    None     Social Determinants of Health     Financial Resource Strain: Not on file   Food Insecurity: Not on file   Transportation Needs: Not on file   Physical Activity: Not on file   Stress: Not on file   Social Connections: Not on file   Intimate Partner Violence: Not on file   Housing Stability: Not on file        Review of Systems   Constitutional: Negative for appetite change, chills and fever  HENT: Negative for sore throat  Respiratory: Negative for cough and shortness of breath  Cardiovascular: Negative for chest pain  Gastrointestinal: Negative for diarrhea, nausea and vomiting  Musculoskeletal: Negative for joint swelling  Skin: Negative for rash and wound  Neurological: Positive for numbness  Negative for weakness  Hematological: Does not bruise/bleed easily  Psychiatric/Behavioral: Negative for behavioral problems and confusion  Objective:      /78   Pulse 90   Ht 5' 6" (1 676 m) Comment: verbal  Wt 94 3 kg (208 lb)   BMI 33 57 kg/m²          Physical Exam  Vitals reviewed  Constitutional:       General: She is not in acute distress  Appearance: Normal appearance  She is not ill-appearing  HENT:      Head: Normocephalic and atraumatic     Eyes:      Extraocular Movements: Extraocular movements intact  Cardiovascular:      Rate and Rhythm: Normal rate and regular rhythm  Pulses: Pulses are weak  Dorsalis pedis pulses are 1+ on the right side and 1+ on the left side  Posterior tibial pulses are 0 on the right side and 0 on the left side  Comments: No ischemia  Pulmonary:      Effort: Pulmonary effort is normal  No respiratory distress  Musculoskeletal:         General: No swelling, tenderness, deformity or signs of injury  Cervical back: Normal range of motion and neck supple  Right lower leg: No edema  Left lower leg: No edema  Right foot: No Charcot foot or foot drop  Left foot: No Charcot foot or foot drop  Comments: Mild hammertoes  No acute joint swelling or redness  Feet:      Right foot:      Protective Sensation: 10 sites tested  0 sites sensed  Skin integrity: Dry skin present  No ulcer or erythema  Left foot:      Protective Sensation: 10 sites tested  0 sites sensed  Skin integrity: Dry skin present  No ulcer or erythema  Skin:     General: Skin is warm and dry  Capillary Refill: Capillary refill takes less than 2 seconds  Coloration: Skin is not cyanotic or mottled  Findings: No abscess, ecchymosis, erythema, rash or wound  Nails: There is no clubbing  Comments: Mild fissure at the tip of right great toe  Mild discoloration and thickening of distal nail plates  Neurological:      General: No focal deficit present  Mental Status: She is alert and oriented to person, place, and time  Cranial Nerves: No cranial nerve deficit  Sensory: Sensory deficit present  Motor: No weakness  Coordination: Coordination normal    Psychiatric:         Mood and Affect: Mood normal          Behavior: Behavior normal          Thought Content:  Thought content normal          Judgment: Judgment normal              Diabetic Foot Exam    Patient's shoes and socks removed  Right Foot/Ankle   Right Foot Inspection  Skin Exam: skin intact and dry skin  No erythema, no pre-ulcer and no ulcer  Toe Exam: No swelling, no tenderness, erythema and  no right toe deformity    Sensory   Vibration: absent  Proprioception: diminished  Monofilament testing: absent    Vascular  Capillary refills: < 3 seconds  The right DP pulse is 1+  The right PT pulse is 0  Left Foot/Ankle  Left Foot Inspection  Skin Exam: skin intact and dry skin  No erythema, no pre-ulcer and no ulcer  Toe Exam: No swelling, no tenderness, no erythema and no left toe deformity  Sensory   Vibration: absent  Proprioception: diminished  Monofilament testing: absent    Vascular  Capillary refills: < 3 seconds  The left DP pulse is 1+  The left PT pulse is 0       Assign Risk Category  No deformity present  Loss of protective sensation  Weak pulses  Risk: 2

## 2022-09-14 NOTE — TELEPHONE ENCOUNTER
Patient called to schedule new patient appointment for neuropathy of arms, legs, and face  No testing done  Triage intake sent

## 2022-10-06 ENCOUNTER — TELEPHONE (OUTPATIENT)
Dept: PODIATRY | Facility: CLINIC | Age: 52
End: 2022-10-06

## 2022-12-21 ENCOUNTER — OFFICE VISIT (OUTPATIENT)
Dept: PAIN MEDICINE | Facility: CLINIC | Age: 52
End: 2022-12-21

## 2022-12-21 ENCOUNTER — TELEPHONE (OUTPATIENT)
Dept: PAIN MEDICINE | Facility: CLINIC | Age: 52
End: 2022-12-21

## 2022-12-21 VITALS
HEIGHT: 66 IN | HEART RATE: 91 BPM | WEIGHT: 217 LBS | BODY MASS INDEX: 34.87 KG/M2 | DIASTOLIC BLOOD PRESSURE: 62 MMHG | SYSTOLIC BLOOD PRESSURE: 118 MMHG | TEMPERATURE: 97.9 F

## 2022-12-21 DIAGNOSIS — M79.604 CHRONIC PAIN OF BOTH LOWER EXTREMITIES: ICD-10-CM

## 2022-12-21 DIAGNOSIS — M79.605 CHRONIC PAIN OF BOTH LOWER EXTREMITIES: ICD-10-CM

## 2022-12-21 DIAGNOSIS — G89.29 CHRONIC PAIN OF BOTH LOWER EXTREMITIES: ICD-10-CM

## 2022-12-21 DIAGNOSIS — M79.2 NEUROPATHIC PAIN OF FOOT, UNSPECIFIED LATERALITY: ICD-10-CM

## 2022-12-21 DIAGNOSIS — M79.673 CHRONIC FOOT PAIN, UNSPECIFIED LATERALITY: ICD-10-CM

## 2022-12-21 DIAGNOSIS — G89.4 CHRONIC PAIN SYNDROME: Primary | ICD-10-CM

## 2022-12-21 DIAGNOSIS — G89.29 CHRONIC FOOT PAIN, UNSPECIFIED LATERALITY: ICD-10-CM

## 2022-12-21 DIAGNOSIS — E11.42 DIABETIC POLYNEUROPATHY ASSOCIATED WITH TYPE 2 DIABETES MELLITUS (HCC): ICD-10-CM

## 2022-12-21 RX ORDER — NORTRIPTYLINE HYDROCHLORIDE 10 MG/1
CAPSULE ORAL
Qty: 60 CAPSULE | Refills: 1 | Status: SHIPPED | OUTPATIENT
Start: 2022-12-21

## 2022-12-21 RX ORDER — PREGABALIN 150 MG/1
CAPSULE ORAL
Qty: 90 CAPSULE | Refills: 1 | Status: SHIPPED | OUTPATIENT
Start: 2022-12-21

## 2022-12-21 RX ORDER — BACLOFEN 20 MG/1
TABLET ORAL
Qty: 90 TABLET | Refills: 1 | Status: SHIPPED | OUTPATIENT
Start: 2022-12-21

## 2022-12-21 NOTE — PROGRESS NOTES
Assessment:  1  Chronic pain syndrome    2  Chronic pain of both lower extremities    3  Chronic foot pain, unspecified laterality    4  Neuropathic pain of foot, unspecified laterality    5  Diabetic polyneuropathy associated with type 2 diabetes mellitus ([facility] [address] )        Plan:  While the patient was in the office today, I did have a thorough conversation with the patient regarding their chronic pain syndrome, symptoms, and medication regimen/treatment plan  I discussed with the patient that at this point in time I feel it is definitely important for her to follow-up with neurology and if they would offer a sooner appointment to be seen sooner if possible  I also thoroughly encouraged the patient to continue to work on managing her diabetes and keeping her blood sugars as controlled as possible  The patient was agreeable and verbalized an understanding  With regards to her medication regimen since she is not sleeping well at nighttime and I deafly feel there is a depression and neuropathic component, I am going to start her on nortriptyline 10 mg and then slowly titrate her up to 20 mg at bedtime with the hope that it will help her sleep better, help the neuropathic component, and even possibly help the depression and anxiety because of all the pain symptoms and her overall health  We will continue the Lyrica as prescribed but I am also going to increase the baclofen to 20 mg 3 times daily to try to better manage the myofascial component of the pain  I discussed with the patient that they should not drive or operate machinery until they see how the medication changes affects them and if they have any side effects or issues, they are to call our office and make us aware so we can take the appropriate action and provide changes to the medications or treatment plan  The patient was agreeable and verbalized an understanding      The patient will follow-up in 8 weeks for medication prescription refill and reevaluation  The patient was advised to contact the office should their symptoms worsen in the interim  The patient was agreeable and verbalized an understanding  History of Present Illness: The patient is a 45 y o  female last seen on 8-31-22 who presents for a follow up office visit in regards to chronic pain syndrome, as the patient's pain has been ongoing for greater than a year, secondary to diabetic neuropathy and chronic pain of the feet and lower extremities  The patient currently reports that since her last office visit her pain symptoms have actually continued to worsen as she is also now experiencing pain in her upper extremities and is worried that the neuropathy is starting to spread to her arms as well, but reports that her blood sugars have been much more manageable than they have been in the past as they have all been under 200 when typically she ran somewhere between 200-300 so she is trying to keep her blood sugars better managed, but feels that ever since her TIA the numbness in her face has not improved and the pain and numbness in her left greater than right upper extremity is worsening as well and she feels that she continues to drop things and is just not as strong overall and is definitely having worsening spasms and is back to not sleeping well at nighttime  The patient reports that at this point she is scheduled for an outpatient neurology consultation to discuss her continued symptoms since her TIA, but the earliest appointment she could get was in March 2023  The patient presents today for a medication follow-up visit  Current pain medications includes: Baclofen 10 mg 4 times daily and Lyrica 150 mg 3 times daily  The patient reports that this regimen is providing 20% pain relief  The patient is reporting no side effects from this pain medication regimen      I have personally reviewed and/or updated the patient's past medical history, past surgical history, family history, social history, current medications, allergies, and vital signs today  Review of Systems:    Review of Systems   Constitutional: Negative for fever and unexpected weight change  HENT: Negative for trouble swallowing  Eyes: Negative for visual disturbance  Respiratory: Negative for shortness of breath and wheezing  Cardiovascular: Negative for chest pain and palpitations  Gastrointestinal: Positive for diarrhea  Negative for constipation, nausea and vomiting  Endocrine: Negative for cold intolerance, heat intolerance and polydipsia  Genitourinary: Negative for difficulty urinating and frequency  Musculoskeletal: Positive for gait problem and joint swelling (Joint stiffness)  Negative for arthralgias and myalgias  Skin: Negative for rash  Neurological: Negative for dizziness, seizures, syncope, weakness (muscle weakness) and headaches  Hematological: Does not bruise/bleed easily  Psychiatric/Behavioral: Negative for dysphoric mood  All other systems reviewed and are negative  Past Medical History:   Diagnosis Date   • Asthma    • Diabetes mellitus ([facility] [address] )    • History of angina    • Hyperlipidemia    • Hypertension        Past Surgical History:   Procedure Laterality Date   • BLADDER SURGERY         History reviewed  No pertinent family history      Social History     Occupational History   • Not on file   Tobacco Use   • Smoking status: Former   • Smokeless tobacco: Never   Vaping Use   • Vaping Use: Never used   Substance and Sexual Activity   • Alcohol use: Never   • Drug use: Never   • Sexual activity: Not on file         Current Outpatient Medications:   •  albuterol (PROVENTIL HFA,VENTOLIN HFA) 90 mcg/act inhaler, INHALE 1 PUFF BY MOUTH EVERY 4 HOURS AS NEEDED, Disp: , Rfl:   •  atorvastatin (LIPITOR) 40 mg tablet, , Disp: , Rfl:   •  B-D UF III MINI PEN NEEDLES 31G X 5 MM MISC, , Disp: , Rfl:   •  baclofen 20 mg tablet, Take 1 PO TID, Disp: 90 tablet, Rfl: 1  • ciclopirox (PENLAC) 8 % solution, Apply topically daily at bedtime, Disp: 6 6 mL, Rfl: 5  •  ciprofloxacin (CIPRO) 500 mg tablet, Take 500 mg by mouth every 12 (twelve) hours, Disp: , Rfl:   •  Continuous Blood Gluc Sensor (FreeStyle Mina 14 Day Sensor) MISC, Use as directed, Disp: , Rfl:   •  Farxiga 10 MG TABS, , Disp: , Rfl:   •  insulin aspart (NovoLOG FlexPen) 100 UNIT/ML injection pen,  See Instructions, take 70 units with meals, # 60 mL, 4 Refill(s), Pharmacy: [facility] [id number], 165, 05/27/22 13:08:00 EDT, Height/Length, cm, 97 976, 05/27/22 13:08:00 EDT, Weight Measured, kg, Disp: , Rfl:   •  Lantus SoloStar 100 units/mL injection pen, , Disp: , Rfl:   •  lisinopril-hydrochlorothiazide (PRINZIDE,ZESTORETIC) 20-25 MG per tablet, , Disp: , Rfl:   •  nortriptyline (PAMELOR) 10 mg capsule, Take 1 PO HS x 2 weeks, then 2 PO HS , Disp: 60 capsule, Rfl: 1  •  pregabalin (LYRICA) 150 mg capsule, Take PO TID , Disp: 90 capsule, Rfl: 1  •  Trulicity 3 45 DY/8 4IC SOPN, Inject under the skin once a week As directed, Disp: , Rfl:   •  urea (CARMOL) 30 % cream, Apply topically 3 (three) times a day, Disp: 227 g, Rfl: 1  •  insulin lispro (HumaLOG) 100 units/mL injection pen, , Disp: , Rfl:     No Known Allergies    Physical Exam:    /62 (BP Location: Left arm, Patient Position: Sitting, Cuff Size: Standard)   Pulse 91   Temp 97 9 °F (36 6 °C)   Ht 5' 6" (1 676 m)   Wt 98 4 kg (217 lb)   BMI 35 02 kg/m²     Constitutional:normal, well developed, well nourished, alert, in no distress and non-toxic and no overt pain behavior   and overweight  Eyes:anicteric  HEENT:grossly intact  Neck:supple, symmetric, trachea midline and no masses   Pulmonary:even and unlabored  Cardiovascular:No edema or pitting edema present  Skin:Normal without rashes or lesions and well hydrated  Psychiatric:Mood and affect appropriate  Neurologic:Cranial Nerves II-XII grossly intact  Musculoskeletal:The patient's gait is slightly antalgic, but steady without the use of any assistive devices  Imaging  No orders to display         No orders of the defined types were placed in this encounter

## 2022-12-21 NOTE — TELEPHONE ENCOUNTER
S/w pt, stated that [facility] does not have the rx for pamelor  Pt agreed to hold while the writer follows up  S/w [facility] pharmacy - confirmed nortriptyline / pamelor is there and ready for pu  Advised pt of same  Pt stated that she will fu w/ the pharmacy

## 2022-12-21 NOTE — TELEPHONE ENCOUNTER
Caller: Herminio Arriola (PT)    Doctor: [facility]    Reason for call: Pt called in to confirm if the medication   nortriptyline (PAMELOR) 10 mg capsule      Call back#: [phone]

## 2023-02-15 ENCOUNTER — TELEPHONE (OUTPATIENT)
Dept: PAIN MEDICINE | Facility: MEDICAL CENTER | Age: 53
End: 2023-02-15

## 2023-02-15 DIAGNOSIS — E11.42 DIABETIC POLYNEUROPATHY ASSOCIATED WITH TYPE 2 DIABETES MELLITUS (HCC): ICD-10-CM

## 2023-02-15 DIAGNOSIS — G89.29 CHRONIC PAIN OF BOTH LOWER EXTREMITIES: ICD-10-CM

## 2023-02-15 DIAGNOSIS — M79.605 CHRONIC PAIN OF BOTH LOWER EXTREMITIES: ICD-10-CM

## 2023-02-15 DIAGNOSIS — M79.2 NEUROPATHIC PAIN OF FOOT, UNSPECIFIED LATERALITY: ICD-10-CM

## 2023-02-15 DIAGNOSIS — M79.673 CHRONIC FOOT PAIN, UNSPECIFIED LATERALITY: ICD-10-CM

## 2023-02-15 DIAGNOSIS — G89.4 CHRONIC PAIN SYNDROME: ICD-10-CM

## 2023-02-15 DIAGNOSIS — M79.604 CHRONIC PAIN OF BOTH LOWER EXTREMITIES: ICD-10-CM

## 2023-02-15 DIAGNOSIS — G89.29 CHRONIC FOOT PAIN, UNSPECIFIED LATERALITY: ICD-10-CM

## 2023-02-15 RX ORDER — BACLOFEN 20 MG/1
TABLET ORAL
Qty: 90 TABLET | Refills: 0 | Status: SHIPPED | OUTPATIENT
Start: 2023-02-15

## 2023-02-15 RX ORDER — PREGABALIN 150 MG/1
CAPSULE ORAL
Qty: 90 CAPSULE | Refills: 0 | Status: SHIPPED | OUTPATIENT
Start: 2023-02-15

## 2023-02-15 NOTE — TELEPHONE ENCOUNTER
I sent a 30 day supply for both the Lyrica and Baclofen to get her to her OV as scheduled later on this month  Thank you

## 2023-02-15 NOTE — TELEPHONE ENCOUNTER
Pt contacted Call Center requested refill of their medication  Medication Name: pregabalin (LYRICA)      Dosage of Med: 150 mg       Frequency of Med: Take PO TID      Remaining Medication: few left       Pharmacy and [address] [place], [place] [address]       Pt  Preferred Callback Phone Number: [phone]    Pt contacted Call Center requested refill of their medication  Medication Name: baclofen      Dosage of Med: 20 mg       Frequency of Med: Take 1 PO TID      Remaining Medication: few left       Pharmacy and Location: [address] [place] [address] MELODIE Martinez     Pt  Preferred Callback Phone Number: [phone]    Thank you

## 2023-02-15 NOTE — TELEPHONE ENCOUNTER
S/w pt, confirmed lyrica 150 mg, 1 pill po tid w/ + relief, no se's  Pt confirmed baclofen 20 mg 1 pill am, 2 pills hs w/ + relief, no se's  Pt stated that she is just a few days short of making it to her appt on 2/27  Pt stated that she cancelled her appt on 2/13 s/t illness  Advised pt, the writer will virgil RAMOS  Anticipate this rx will be sent to the pharmacy for pu later today  This office will cb if there is any question or change in the plan as discussed  Pt verbalized understanding and confirmed 2/27 fu ov

## 2023-02-27 ENCOUNTER — OFFICE VISIT (OUTPATIENT)
Dept: PAIN MEDICINE | Facility: CLINIC | Age: 53
End: 2023-02-27

## 2023-02-27 VITALS
DIASTOLIC BLOOD PRESSURE: 74 MMHG | BODY MASS INDEX: 36.16 KG/M2 | TEMPERATURE: 97.2 F | SYSTOLIC BLOOD PRESSURE: 124 MMHG | HEART RATE: 93 BPM | HEIGHT: 66 IN | WEIGHT: 225 LBS

## 2023-02-27 DIAGNOSIS — G89.29 CHRONIC FOOT PAIN, UNSPECIFIED LATERALITY: ICD-10-CM

## 2023-02-27 DIAGNOSIS — M79.673 CHRONIC FOOT PAIN, UNSPECIFIED LATERALITY: ICD-10-CM

## 2023-02-27 DIAGNOSIS — G89.29 CHRONIC PAIN OF BOTH LOWER EXTREMITIES: ICD-10-CM

## 2023-02-27 DIAGNOSIS — E11.42 DIABETIC POLYNEUROPATHY ASSOCIATED WITH TYPE 2 DIABETES MELLITUS (HCC): ICD-10-CM

## 2023-02-27 DIAGNOSIS — M79.605 CHRONIC PAIN OF BOTH LOWER EXTREMITIES: ICD-10-CM

## 2023-02-27 DIAGNOSIS — G89.4 CHRONIC PAIN SYNDROME: Primary | ICD-10-CM

## 2023-02-27 DIAGNOSIS — M79.2 NEUROPATHIC PAIN OF FOOT, UNSPECIFIED LATERALITY: ICD-10-CM

## 2023-02-27 DIAGNOSIS — M79.2 NEUROPATHIC PAIN OF HAND, UNSPECIFIED LATERALITY: ICD-10-CM

## 2023-02-27 DIAGNOSIS — M79.604 CHRONIC PAIN OF BOTH LOWER EXTREMITIES: ICD-10-CM

## 2023-02-27 RX ORDER — BACLOFEN 20 MG/1
TABLET ORAL
Qty: 90 TABLET | Refills: 1 | Status: SHIPPED | OUTPATIENT
Start: 2023-02-27

## 2023-02-27 RX ORDER — PREGABALIN 150 MG/1
CAPSULE ORAL
Qty: 90 CAPSULE | Refills: 1 | Status: SHIPPED | OUTPATIENT
Start: 2023-02-27

## 2023-02-27 RX ORDER — NORTRIPTYLINE HYDROCHLORIDE 25 MG/1
CAPSULE ORAL
Qty: 60 CAPSULE | Refills: 1 | Status: SHIPPED | OUTPATIENT
Start: 2023-02-27

## 2023-02-27 NOTE — PROGRESS NOTES
Assessment:  1  Chronic pain syndrome    2  Chronic pain of both lower extremities    3  Chronic foot pain, unspecified laterality    4  Neuropathic pain of hand, unspecified laterality    5  Neuropathic pain of foot, unspecified laterality    6  Diabetic polyneuropathy associated with type 2 diabetes mellitus ([facility] )        Plan:  While the patient was in the office today, I did have a thorough conversation with the patient regarding their chronic pain syndrome, symptoms, and medication regimen/treatment plan  I discussed with the patient that at this point time since she has not noticed any side effects with the nortriptyline and is on a very low and subtherapeutic dose and her biggest goal is to try to get her sleeping better at nighttime, I feel it would be in her best interest to increase the nortriptyline to 25 mg at bedtime for 2 weeks and then as long as she is not having side effects go to a more therapeutic dose of 50 mg at bedtime and see how she does  In the meantime we will continue the Lyrica and baclofen as prescribed  I discussed with the patient that they should not drive or operate machinery until they see how the medication changes affects them and if they have any side effects or issues, they are to call our office and make us aware so we can take the appropriate action and provide changes to the medications or treatment plan  The patient was agreeable and verbalized an understanding  The patient will follow-up in 8 weeks for medication prescription refill and reevaluation  The patient was advised to contact the office should their symptoms worsen in the interim  The patient was agreeable and verbalized an understanding  History of Present Illness:     The patient is a 45 y o  female last seen on 12/21/2022 who presents for a follow up office visit in regards to chronic pain syndrome, as the patient's pain has been ongoing for greater than a year, secondary to chronic foot and hand pain secondary to diabetic neuropathy  The patient currently reports that since her last office visit overall her pain symptoms have remained the same and although she is sleeping a little bit better as she reports she does get at least 2 to 3 hours of straight sleep and then is waking up consistently throughout the night because of the pain, she has not seen a significant difference with the addition of the nortriptyline to her medication regimen, but is also not noting side effects  The patient does feel that her previously prescribed baclofen and Lyrica is still better than any other medication regimen that has been tried but overall her pain is still not as significantly improved or managed that she would like  The patient reports that she does have an upcoming office visit with neurology status post her CVA which has taken over 6 months to get into and presents today for regular medication follow-up visit  Current pain medications includes: Nortriptyline 20 mg at bedtime, baclofen 20 mg 3 times daily, and Lyrica 150 mg 3 times daily  The patient reports that this regimen is providing 30% pain relief  The patient is reporting no side effects from this pain medication regimen  I have personally reviewed and/or updated the patient's past medical history, past surgical history, family history, social history, current medications, allergies, and vital signs today  Review of Systems:    Review of Systems   Respiratory: Negative for shortness of breath  Cardiovascular: Negative for chest pain  Gastrointestinal: Negative for constipation, diarrhea, nausea and vomiting  Musculoskeletal: Positive for gait problem and joint swelling (Joint stiffness)  Negative for arthralgias and myalgias  Skin: Negative for rash  Neurological: Positive for dizziness and weakness  Negative for seizures  All other systems reviewed and are negative          Past Medical History:   Diagnosis Date   • Asthma    • Diabetes mellitus ([address] )    • History of angina    • Hyperlipidemia    • Hypertension        Past Surgical History:   Procedure Laterality Date   • BLADDER SURGERY         History reviewed  No pertinent family history      Social History     Occupational History   • Not on file   Tobacco Use   • Smoking status: Former   • Smokeless tobacco: Never   Vaping Use   • Vaping Use: Never used   Substance and Sexual Activity   • Alcohol use: Never   • Drug use: Never   • Sexual activity: Not on file         Current Outpatient Medications:   •  albuterol (PROVENTIL HFA,VENTOLIN HFA) 90 mcg/act inhaler, INHALE 1 PUFF BY MOUTH EVERY 4 HOURS AS NEEDED, Disp: , Rfl:   •  atorvastatin (LIPITOR) 40 mg tablet, , Disp: , Rfl:   •  baclofen 20 mg tablet, Take 1 PO TID, Disp: 90 tablet, Rfl: 1  •  ciclopirox (PENLAC) 8 % solution, Apply topically daily at bedtime, Disp: 6 6 mL, Rfl: 5  •  Farxiga 10 MG TABS, , Disp: , Rfl:   •  Lantus SoloStar 100 units/mL injection pen, , Disp: , Rfl:   •  lisinopril-hydrochlorothiazide (PRINZIDE,ZESTORETIC) 20-25 MG per tablet, , Disp: , Rfl:   •  nortriptyline (PAMELOR) 25 mg capsule, Take 1 PO HS x 2 weeks, then 2 PO HS , Disp: 60 capsule, Rfl: 1  •  pregabalin (LYRICA) 150 mg capsule, Take PO TID , Disp: 90 capsule, Rfl: 1  •  Trulicity 5 89 LW/8 8GD SOPN, Inject under the skin once a week As directed, Disp: , Rfl:   •  urea (CARMOL) 30 % cream, Apply topically 3 (three) times a day, Disp: 227 g, Rfl: 1  •  B-D UF III MINI PEN NEEDLES 31G X 5 MM MISC, , Disp: , Rfl:   •  ciprofloxacin (CIPRO) 500 mg tablet, Take 500 mg by mouth every 12 (twelve) hours (Patient not taking: Reported on 2/27/2023), Disp: , Rfl:   •  Continuous Blood Gluc Sensor (FreeStyle Mina 14 Day Sensor) MISC, Use as directed, Disp: , Rfl:   •  insulin aspart (NovoLOG FlexPen) 100 UNIT/ML injection pen,  See Instructions, take 70 units with meals, # 60 mL, 4 Refill(s), Pharmacy: [id number], 165, 05/27/22 13:08:00 EDT, Height/Length, cm, 97 976, 05/27/22 13:08:00 EDT, Weight Measured, kg (Patient not taking: Reported on 2/27/2023), Disp: , Rfl:   •  insulin lispro (HumaLOG) 100 units/mL injection pen, , Disp: , Rfl:     No Known Allergies    Physical Exam:    /74 (BP Location: Left arm, Patient Position: Sitting, Cuff Size: Large)   Pulse 93   Temp (!) 97 2 °F (36 2 °C)   Ht 5' 6" (1 676 m)   Wt 102 kg (225 lb)   BMI 36 32 kg/m²     Constitutional:normal, well developed, well nourished, alert, in no distress and non-toxic and no overt pain behavior  and overweight  Eyes:anicteric  HEENT:grossly intact  Neck:supple, symmetric, trachea midline and no masses   Pulmonary:even and unlabored  Cardiovascular:No edema or pitting edema present  Skin:Normal without rashes or lesions and well hydrated  Psychiatric:Mood and affect appropriate  Neurologic:Cranial Nerves II-XII grossly intact  Musculoskeletal:The patient's gait is slightly antalgic, but steady without the use of any assistive devices  Imaging  No orders to display         No orders of the defined types were placed in this encounter

## 2023-03-03 ENCOUNTER — CONSULT (OUTPATIENT)
Dept: NEUROLOGY | Facility: CLINIC | Age: 53
End: 2023-03-03
Payer: COMMERCIAL

## 2023-03-03 VITALS
HEIGHT: 66 IN | TEMPERATURE: 98.1 F | DIASTOLIC BLOOD PRESSURE: 80 MMHG | SYSTOLIC BLOOD PRESSURE: 124 MMHG | BODY MASS INDEX: 35.17 KG/M2 | WEIGHT: 218.8 LBS | HEART RATE: 92 BPM

## 2023-03-03 DIAGNOSIS — G45.9 TIA (TRANSIENT ISCHEMIC ATTACK): ICD-10-CM

## 2023-03-03 DIAGNOSIS — Z79.4 TYPE 2 DIABETES MELLITUS WITH DIABETIC NEUROPATHY, WITH LONG-TERM CURRENT USE OF INSULIN (HCC): ICD-10-CM

## 2023-03-03 DIAGNOSIS — E11.40 TYPE 2 DIABETES MELLITUS WITH DIABETIC NEUROPATHY, WITH LONG-TERM CURRENT USE OF INSULIN (HCC): ICD-10-CM

## 2023-03-03 DIAGNOSIS — G62.9 PERIPHERAL NEUROPATHY: ICD-10-CM

## 2023-03-03 DIAGNOSIS — R20.0 FACIAL NUMBNESS: Primary | ICD-10-CM

## 2023-03-03 DIAGNOSIS — M79.2 NEUROPATHIC PAIN OF FOOT, UNSPECIFIED LATERALITY: ICD-10-CM

## 2023-03-03 DIAGNOSIS — G56.20 ULNAR NEUROPATHY: ICD-10-CM

## 2023-03-03 PROCEDURE — 99204 OFFICE O/P NEW MOD 45 MIN: CPT | Performed by: STUDENT IN AN ORGANIZED HEALTH CARE EDUCATION/TRAINING PROGRAM

## 2023-03-03 NOTE — PATIENT INSTRUCTIONS
Patient Instructions:  -please obtain the blood work that I have ordered for workup of your neuropathy, brain fog, and muscle twitching  -please schedule your MRI brain with and without contrast  -please schedule your EMG of your bilateral upper extremities  -referral placed for physical therapy  -follow up after above testing is complete

## 2023-03-03 NOTE — PROGRESS NOTES
[facility]  PATIENT:  Thai Chandler  MRN:  [id number]  :  1970  DATE OF SERVICE:  3/3/2023  REFERRED BY: Payal Vincent DPM  PMD: Gloria Yuen MD    Assessment/Plan:     Thai Chandler is a very pleasant 45 y.o. female with a past medical history that includes T2DM c/b neuropathy, chronic pain initially referred for evaluation of neuropathy presenting with multiple neurologic complaints today. Facial numbness  L facial/LUE weakness  -L hemiparesis improved since initial evaluation at [facility] in 2022. Reports bilateral facial numbness waxing/waning since then. Recommend MRI brain for further workup as she did not previously have any stroke workup performed. Given waxing/waning nature of symptoms and history of migraines at times associated with similar symptoms, possibility also for complex migraine    Peripheral neuropathy  Myoclonic jerks in UE  Unsteady gait  -neuropathy likely 2/2 T2DM. Will send CK, aldolase, Mg, Ca, CMP, TSH, B12  -sensory exam in UE also c/f possible bilateral ulnar neuropathy. Recommend EMG bilateral UE for clarification  -unsteady gait most likely 2/2 diabetic neuropathy. Recommend physical therapy referral     CC:   Neuropathy    History of Present Illness:     45 y.o. female with a past medical history that includes T2DM c/b neuropathy, chronic pain initially referred for evaluation of neuropathy presenting with multiple neurologic complaints today. States that back in 2022 she had a suspected TIA. At that time she fell, had sensitivity to light, nausea, weakness and a headache all day. Per the ED notes from [facility], had chief complaint at that time of speech changes and LUE weakness which had been ongoing for 3 days prior to arrival. NIHSS 6 at time of assessment. Since then, she has had waxing and waning numbness affecting her entire face. She has difficulty concentrating and also feels that her eyes are heavy when these symptoms worsen. Occasionally will have slurred speech in association with these episodes. At baseline, she has painful neuropathy in her feet thought to be 2/2 T2DM. She reports falls and poor balance in association with these symptoms. Also reports occasional  "jerks" or "jitters" in her bilateral hands. She does report an aunt and daughter who have history of migraines. Past Medical History:     Past Medical History:   Diagnosis Date   • Asthma    • Diabetes mellitus ([address])    • History of angina    • Hyperlipidemia    • Hypertension        Patient Active Problem List   Diagnosis   • Chronic pain syndrome   • Neuropathic pain of foot   • Chronic foot pain   • Diabetic polyneuropathy associated with type 2 diabetes mellitus ([facility])   • Chronic pain of both lower extremities   • Neuropathic pain of hand       Medications:      Current Outpatient Medications   Medication Sig Dispense Refill   • albuterol (PROVENTIL HFA,VENTOLIN HFA) 90 mcg/act inhaler INHALE 1 PUFF BY MOUTH EVERY 4 HOURS AS NEEDED     • atorvastatin (LIPITOR) 40 mg tablet      • B-D UF III MINI PEN NEEDLES 31G X 5 MM MISC      • baclofen 20 mg tablet Take 1 PO TID 90 tablet 1   • ciclopirox (PENLAC) 8 % solution Apply topically daily at bedtime 6.6 mL 5   • Continuous Blood Gluc Sensor (FreeStyle Mina 14 Day Sensor) MISC Use as directed     • Farxiga 10 MG TABS      • insulin aspart (NovoLOG FlexPen) 100 UNIT/ML injection pen      • Lantus SoloStar 100 units/mL injection pen      • lisinopril-hydrochlorothiazide (PRINZIDE,ZESTORETIC) 20-25 MG per tablet      • nortriptyline (PAMELOR) 25 mg capsule Take 1 PO HS x 2 weeks, then 2 PO HS. 60 capsule 1   • pregabalin (LYRICA) 150 mg capsule Take PO TID.  90 capsule 1   • Trulicity 7.25 EY/8.6FL SOPN Inject under the skin once a week As directed     • urea (CARMOL) 30 % cream Apply topically 3 (three) times a day 227 g 1   • ciprofloxacin (CIPRO) 500 mg tablet Take 500 mg by mouth every 12 (twelve) hours (Patient not taking: Reported on 2/27/2023)     • insulin lispro (HumaLOG) 100 units/mL injection pen  (Patient not taking: Reported on 9/14/2022)       No current facility-administered medications for this visit. Allergies:    No Known Allergies    Family History:     No family history on file. Social History:       Social History     Socioeconomic History   • Marital status:      Spouse name: Not on file   • Number of children: Not on file   • Years of education: Not on file   • Highest education level: Not on file   Occupational History   • Not on file   Tobacco Use   • Smoking status: Former   • Smokeless tobacco: Never   Vaping Use   • Vaping Use: Never used   Substance and Sexual Activity   • Alcohol use: Never   • Drug use: Never   • Sexual activity: Not on file   Other Topics Concern   • Not on file   Social History Narrative   • Not on file     Social Determinants of Health     Financial Resource Strain: Not on file   Food Insecurity: Not on file   Transportation Needs: Not on file   Physical Activity: Not on file   Stress: Not on file   Social Connections: Not on file   Intimate Partner Violence: Not on file   Housing Stability: Not on file         Objective:   /80 (BP Location: Left arm, Patient Position: Sitting, Cuff Size: Large)   Pulse 92   Temp 98.1 °F (36.7 °C)   Ht 5' 6" (1.676 m)   Wt 99.2 kg (218 lb 12.8 oz)   BMI 35.32 kg/m²     General: Patient is not in any acute/apparent distress, well nourished, well developed and cooperative. HEENT: normocephalic, atraumatic, moist membranes  Neck: supple  Extremities: no edema noted   Skin: no lesions or rash  Musculosketal: no bony abnormalities    Neurologic Examination:   Mental status: alert, awake, oriented X 3 and following commands.      Speech/Language: Speech is fluent without any dysarthria, no aphasia noted, can name, comprehension intact    Cranial Nerves:   CN I: smell not tested  CN II: Visual fields full to confrontation  CN III, IV, VI: Extraocular movements intact bilaterally. Pupils equal round and reactive to light bilaterally. CN V: Facial sensation is normal.  CN VII: mildly flattened NLF on the L  CN VIII: Hearing is normal.  CN IX, X: Palate elevates symmetrically. CN XI: Shoulder shrug strength is normal.  CN XII: Tongue midline without atrophy or fasciculations. Motor:   Strength 5/5 in all 4 extremities. Bulk/tone - normal.  Fasiculations - none    Sensory:   Decreased sensation to LT/PP at the fingertips in the 4th/5th digits in the bilateral hands and starting at the ankles bilaterally. Absent vibratory sense at the ankles and below bilaterally. Slightly impaired proprioception bilaterally    Cerebellar:   Finger-to-nose intact    Reflexes: 2+ throughout BUE. 1+ patellars, 0 at ankles  Pathologic reflexes - babinski reflex mute    Gait:   Normal gait, positive romberg     Review of Systems:     Review of Systems   Constitutional: Negative. Negative for appetite change and fever. HENT: Negative. Negative for hearing loss, tinnitus, trouble swallowing and voice change. Eyes: Negative. Negative for photophobia, pain and visual disturbance. Respiratory: Negative. Negative for shortness of breath. Cardiovascular: Positive for palpitations (Sometimes). Gastrointestinal: Negative. Negative for nausea and vomiting. Endocrine: Negative. Negative for cold intolerance. Genitourinary: Negative. Negative for dysuria, frequency and urgency. Musculoskeletal: Negative. Negative for gait problem, myalgias and neck pain. Skin: Negative. Negative for rash. Allergic/Immunologic: Negative. Neurological: Positive for dizziness (Al the times), syncope, facial asymmetry (All the time), speech difficulty (Slurres her words sometimes), weakness (Legs mostly), light-headedness and numbness. Negative for tremors, seizures and headaches. Hematological: Bruises/bleeds easily (Bruises easily). Psychiatric/Behavioral: Positive for sleep disturbance (Staying asleep). Negative for confusion and hallucinations. I have spent 46 minutes with Patient today in which greater than 50% of this time was spent in counseling/coordination of care regarding Risks and benefits of tx options, Instructions for management, Patient and family education, Risk factor reductions and Impressions. I also spent 15 minutes non face to face for this patient the same day.

## 2023-03-17 LAB
ALBUMIN SERPL-MCNC: 4.3 G/DL (ref 3.6–5.1)
ALBUMIN/GLOB SERPL: 1.6 (CALC) (ref 1–2.5)
ALP SERPL-CCNC: 136 U/L (ref 37–153)
ALT SERPL-CCNC: 22 U/L (ref 6–29)
AST SERPL-CCNC: 16 U/L (ref 10–35)
BILIRUB SERPL-MCNC: 0.3 MG/DL (ref 0.2–1.2)
BUN SERPL-MCNC: 14 MG/DL (ref 7–25)
BUN/CREAT SERPL: ABNORMAL (CALC) (ref 6–22)
CA-I SERPL-MCNC: 5.1 MG/DL (ref 4.7–5.5)
CALCIUM SERPL-MCNC: 9.5 MG/DL (ref 8.6–10.4)
CHLORIDE SERPL-SCNC: 100 MMOL/L (ref 98–110)
CO2 SERPL-SCNC: 32 MMOL/L (ref 20–32)
CREAT SERPL-MCNC: 0.95 MG/DL (ref 0.5–1.03)
FOLATE SERPL-MCNC: 16.1 NG/ML
GFR/BSA.PRED SERPLBLD CYS-BASED-ARV: 71 ML/MIN/1.73M2
GLOBULIN SER CALC-MCNC: 2.7 G/DL (CALC) (ref 1.9–3.7)
GLUCOSE SERPL-MCNC: 254 MG/DL (ref 65–99)
MAGNESIUM SERPL-MCNC: 2.3 MG/DL (ref 1.5–2.5)
POTASSIUM SERPL-SCNC: 4.7 MMOL/L (ref 3.5–5.3)
PROT SERPL-MCNC: 7 G/DL (ref 6.1–8.1)
SODIUM SERPL-SCNC: 141 MMOL/L (ref 135–146)
TSH SERPL-ACNC: 0.79 MIU/L
VIT B12 SERPL-MCNC: 527 PG/ML (ref 200–1100)

## 2023-03-20 LAB
ALBUMIN SERPL-MCNC: 4.3 G/DL (ref 3.6–5.1)
ALBUMIN/GLOB SERPL: 1.6 (CALC) (ref 1–2.5)
ALDOLASE SERPL-CCNC: 6.8 U/L
ALP SERPL-CCNC: 136 U/L (ref 37–153)
ALT SERPL-CCNC: 22 U/L (ref 6–29)
AST SERPL-CCNC: 16 U/L (ref 10–35)
BILIRUB SERPL-MCNC: 0.3 MG/DL (ref 0.2–1.2)
BUN SERPL-MCNC: 14 MG/DL (ref 7–25)
BUN/CREAT SERPL: ABNORMAL (CALC) (ref 6–22)
CA-I SERPL-MCNC: 5.1 MG/DL (ref 4.7–5.5)
CALCIUM SERPL-MCNC: 9.5 MG/DL (ref 8.6–10.4)
CHLORIDE SERPL-SCNC: 100 MMOL/L (ref 98–110)
CO2 SERPL-SCNC: 32 MMOL/L (ref 20–32)
CREAT SERPL-MCNC: 0.95 MG/DL (ref 0.5–1.03)
FOLATE SERPL-MCNC: 16.1 NG/ML
GFR/BSA.PRED SERPLBLD CYS-BASED-ARV: 71 ML/MIN/1.73M2
GLOBULIN SER CALC-MCNC: 2.7 G/DL (CALC) (ref 1.9–3.7)
GLUCOSE SERPL-MCNC: 254 MG/DL (ref 65–99)
MAGNESIUM SERPL-MCNC: 2.3 MG/DL (ref 1.5–2.5)
POTASSIUM SERPL-SCNC: 4.7 MMOL/L (ref 3.5–5.3)
PROT SERPL-MCNC: 7 G/DL (ref 6.1–8.1)
SODIUM SERPL-SCNC: 141 MMOL/L (ref 135–146)
TSH SERPL-ACNC: 0.79 MIU/L
VIT B12 SERPL-MCNC: 527 PG/ML (ref 200–1100)

## 2023-03-27 ENCOUNTER — HOSPITAL ENCOUNTER (OUTPATIENT)
Dept: MRI IMAGING | Facility: HOSPITAL | Age: 53
Discharge: HOME | End: 2023-03-27
Attending: STUDENT IN AN ORGANIZED HEALTH CARE EDUCATION/TRAINING PROGRAM

## 2023-03-27 DIAGNOSIS — R20.0 FACIAL NUMBNESS: ICD-10-CM

## 2023-03-27 RX ADMIN — GADOBUTROL 10 ML: 604.72 INJECTION INTRAVENOUS at 13:50

## 2023-04-24 ENCOUNTER — TELEPHONE (OUTPATIENT)
Dept: PAIN MEDICINE | Facility: CLINIC | Age: 53
End: 2023-04-24

## 2023-04-24 ENCOUNTER — OFFICE VISIT (OUTPATIENT)
Dept: PAIN MEDICINE | Facility: CLINIC | Age: 53
End: 2023-04-24

## 2023-04-24 VITALS
HEIGHT: 66 IN | HEART RATE: 102 BPM | DIASTOLIC BLOOD PRESSURE: 78 MMHG | TEMPERATURE: 97.4 F | SYSTOLIC BLOOD PRESSURE: 120 MMHG | WEIGHT: 235 LBS | BODY MASS INDEX: 37.77 KG/M2

## 2023-04-24 DIAGNOSIS — M79.673 CHRONIC FOOT PAIN, UNSPECIFIED LATERALITY: ICD-10-CM

## 2023-04-24 DIAGNOSIS — M79.2 NEUROPATHIC PAIN OF FOOT, UNSPECIFIED LATERALITY: ICD-10-CM

## 2023-04-24 DIAGNOSIS — M79.2 NEUROPATHIC PAIN OF HAND, UNSPECIFIED LATERALITY: ICD-10-CM

## 2023-04-24 DIAGNOSIS — G89.29 CHRONIC FOOT PAIN, UNSPECIFIED LATERALITY: ICD-10-CM

## 2023-04-24 DIAGNOSIS — G89.4 CHRONIC PAIN SYNDROME: Primary | ICD-10-CM

## 2023-04-24 DIAGNOSIS — G89.29 CHRONIC PAIN OF BOTH LOWER EXTREMITIES: ICD-10-CM

## 2023-04-24 DIAGNOSIS — M79.604 CHRONIC PAIN OF BOTH LOWER EXTREMITIES: ICD-10-CM

## 2023-04-24 DIAGNOSIS — E11.42 DIABETIC POLYNEUROPATHY ASSOCIATED WITH TYPE 2 DIABETES MELLITUS (HCC): ICD-10-CM

## 2023-04-24 DIAGNOSIS — M79.605 CHRONIC PAIN OF BOTH LOWER EXTREMITIES: ICD-10-CM

## 2023-04-24 RX ORDER — BACLOFEN 20 MG/1
TABLET ORAL
Qty: 90 TABLET | Refills: 2 | Status: SHIPPED | OUTPATIENT
Start: 2023-04-24

## 2023-04-24 RX ORDER — NORTRIPTYLINE HYDROCHLORIDE 75 MG/1
CAPSULE ORAL
Qty: 30 CAPSULE | Refills: 2 | Status: SHIPPED | OUTPATIENT
Start: 2023-04-24

## 2023-04-24 RX ORDER — PREGABALIN 150 MG/1
CAPSULE ORAL
Qty: 90 CAPSULE | Refills: 2 | Status: SHIPPED | OUTPATIENT
Start: 2023-04-24

## 2023-04-24 NOTE — PROGRESS NOTES
Assessment:  1  Chronic pain syndrome    2  Chronic pain of both lower extremities    3  Chronic foot pain, unspecified laterality    4  Neuropathic pain of foot, unspecified laterality    5  Diabetic polyneuropathy associated with type 2 diabetes mellitus ([address] )    6  Neuropathic pain of hand, unspecified laterality        Plan:  While the patient was in the office today, I did have a thorough conversation with the patient regarding their chronic pain syndrome, symptoms, and medication regimen/treatment plan  I discussed with the patient that it is important to see if she can follow-up with her endocrinologist sooner as we definitely to get her blood sugars back under control that deftly has a lot to do with her neuropathy pain  The patient was agreeable and verbalized an understanding  In the meantime with regards to her medication regimen from our standpoint since she is just on the cusp of the therapeutic dose of nortriptyline does feel it helps her sleep a little bit better at nighttime, I would like to increase the nortriptyline to 75 mg at bedtime for the next 3 months and see how she does  We will continue the baclofen and Lyrica as prescribed  I discussed with the patient that they should not drive or operate machinery until they see how the medication changes affects them and if they have any side effects or issues, they are to call our office and make us aware so we can take the appropriate action and provide changes to the medications or treatment plan  The patient was agreeable and verbalized an understanding  The patient will follow-up in 12 weeks for medication prescription refill and reevaluation  The patient was advised to contact the office should their symptoms worsen in the interim  The patient was agreeable and verbalized an understanding  History of Present Illness:     The patient is a 52 y o  female last seen on 2/27/2022 who presents for a follow up office visit in regards to chronic pain syndrome, as the patient's pain has been ongoing for greater than a year, secondary to diabetic neuropathy of the lower extremities  The patient currently reports that since her last office visit overall her pain symptoms have been worsening, although she deftly feels that with the addition of the nortriptyline she is sleeping a little bit better at nighttime although she still wakes up in the middle the night because of the pain and has been having much bigger issues with regards to managing her blood sugars and needs to see her endocrinologist sooner by her next appointment is not until June  The patient presents today for regular medication follow-up visit  Current pain medications includes: Baclofen 20 mg 3 times daily, nortriptyline 50 mg at bedtime, and Lyrica 150 mg 3 times daily  The patient reports that this regimen is providing 20% pain relief  The patient is reporting no side effects from this pain medication regimen  I have personally reviewed and/or updated the patient's past medical history, past surgical history, family history, social history, current medications, allergies, and vital signs today  Review of Systems:    Review of Systems   Respiratory: Negative for shortness of breath  Cardiovascular: Negative for chest pain  Gastrointestinal: Negative for constipation, diarrhea, nausea and vomiting  Musculoskeletal: Positive for gait problem and joint swelling (Joint stiffness)  Negative for arthralgias and myalgias  Skin: Negative for rash  Neurological: Positive for weakness  Negative for dizziness and seizures  All other systems reviewed and are negative  Past Medical History:   Diagnosis Date   • Asthma    • Diabetes mellitus ([facility] )    • History of angina    • Hyperlipidemia    • Hypertension        Past Surgical History:   Procedure Laterality Date   • BLADDER SURGERY         History reviewed  No pertinent family history      Social History "    Occupational History   • Not on file   Tobacco Use   • Smoking status: Former   • Smokeless tobacco: Never   Vaping Use   • Vaping Use: Never used   Substance and Sexual Activity   • Alcohol use: Never   • Drug use: Never   • Sexual activity: Not on file         Current Outpatient Medications:   •  albuterol (PROVENTIL HFA,VENTOLIN HFA) 90 mcg/act inhaler, INHALE 1 PUFF BY MOUTH EVERY 4 HOURS AS NEEDED, Disp: , Rfl:   •  atorvastatin (LIPITOR) 40 mg tablet, , Disp: , Rfl:   •  baclofen 20 mg tablet, Take 1 PO TID, Disp: 90 tablet, Rfl: 2  •  ciclopirox (PENLAC) 8 % solution, Apply topically daily at bedtime, Disp: 6 6 mL, Rfl: 5  •  Farxiga 10 MG TABS, , Disp: , Rfl:   •  insulin aspart (NovoLOG FlexPen) 100 UNIT/ML injection pen, , Disp: , Rfl:   •  insulin lispro (HumaLOG) 100 units/mL injection pen, , Disp: , Rfl:   •  Lantus SoloStar 100 units/mL injection pen, , Disp: , Rfl:   •  lisinopril-hydrochlorothiazide (PRINZIDE,ZESTORETIC) 20-25 MG per tablet, , Disp: , Rfl:   •  nortriptyline (PAMELOR) 75 MG capsule, Take 1 PO HS, Disp: 30 capsule, Rfl: 2  •  pregabalin (LYRICA) 150 mg capsule, Take PO TID , Disp: 90 capsule, Rfl: 2  •  Trulicity 9 67 QE/4 0OP SOPN, Inject under the skin once a week As directed, Disp: , Rfl:   •  urea (CARMOL) 30 % cream, Apply topically 3 (three) times a day, Disp: 227 g, Rfl: 1  •  B-D UF III MINI PEN NEEDLES 31G X 5 MM MISC, , Disp: , Rfl:   •  Continuous Blood Gluc Sensor (FreeStyle Mina 14 Day Sensor) MISC, Use as directed, Disp: , Rfl:     No Known Allergies    Physical Exam:    /78 (BP Location: Left arm, Patient Position: Sitting, Cuff Size: Extra-Large)   Pulse 102   Temp (!) 97 4 °F (36 3 °C)   Ht 5' 6\" (1 676 m)   Wt 107 kg (235 lb)   BMI 37 93 kg/m²     Constitutional:normal, well developed, well nourished, alert, in no distress and non-toxic and no overt pain behavior    Eyes:anicteric  HEENT:grossly intact  Neck:supple, symmetric, trachea midline and no " masses   Pulmonary:even and unlabored  Cardiovascular:No edema or pitting edema present  Skin:Normal without rashes or lesions and well hydrated  Psychiatric:Mood and affect appropriate  Neurologic:Cranial Nerves II-XII grossly intact  Musculoskeletal:The patient's gait is slightly antalgic, but steady without the use of any assistive devices  Imaging  No orders to display         No orders of the defined types were placed in this encounter

## 2023-04-24 NOTE — TELEPHONE ENCOUNTER
Caller: pharmacy  Doctor: Sheila Maguire    Reason for call: needs directions on a script    Called Lyrica     Call back#: [phone]

## 2023-04-24 NOTE — TELEPHONE ENCOUNTER
S/w pharmacist, confirmed lyrica 150 mg 1 pill po tid prescribed today  Pharmacist verbalized understanding and appreciation

## 2023-07-16 ENCOUNTER — TELEPHONE (OUTPATIENT)
Dept: PAIN MEDICINE | Facility: CLINIC | Age: 53
End: 2023-07-16

## 2023-07-16 DIAGNOSIS — M79.604 CHRONIC PAIN OF BOTH LOWER EXTREMITIES: ICD-10-CM

## 2023-07-16 DIAGNOSIS — G89.29 CHRONIC PAIN OF BOTH LOWER EXTREMITIES: ICD-10-CM

## 2023-07-16 DIAGNOSIS — E11.42 DIABETIC POLYNEUROPATHY ASSOCIATED WITH TYPE 2 DIABETES MELLITUS (HCC): ICD-10-CM

## 2023-07-16 DIAGNOSIS — G89.29 CHRONIC FOOT PAIN, UNSPECIFIED LATERALITY: ICD-10-CM

## 2023-07-16 DIAGNOSIS — M79.673 CHRONIC FOOT PAIN, UNSPECIFIED LATERALITY: ICD-10-CM

## 2023-07-16 DIAGNOSIS — M79.605 CHRONIC PAIN OF BOTH LOWER EXTREMITIES: ICD-10-CM

## 2023-07-16 DIAGNOSIS — M79.2 NEUROPATHIC PAIN OF FOOT, UNSPECIFIED LATERALITY: ICD-10-CM

## 2023-07-16 DIAGNOSIS — G89.4 CHRONIC PAIN SYNDROME: ICD-10-CM

## 2023-07-17 RX ORDER — NORTRIPTYLINE HYDROCHLORIDE 75 MG/1
CAPSULE ORAL
Qty: 30 CAPSULE | Refills: 0 | OUTPATIENT
Start: 2023-07-17

## 2023-07-17 NOTE — TELEPHONE ENCOUNTER
Pt contacted Call Center requested refill of their medication. Medication Name: pregabalin                                    Nortriptyline                                    Baclofen   Dosage of Med: 150 mg                               75 mg                                20 mg  Frequency of Med: 3 x a day                                       1 at night                                       3 x a day     Remaining Medication:   1/1/2 to 2 weeks    Pharmacy and Location: [facility]        Pt. Preferred Callback Phone Number:  [phone]  Thank you. PATIENT DOES NOT HAVE A APPT UNTIL 9/5/23.  NO APPT AVAILABLE UNTIL THEN

## 2023-07-18 DIAGNOSIS — G89.4 CHRONIC PAIN SYNDROME: ICD-10-CM

## 2023-07-18 DIAGNOSIS — M79.673 CHRONIC FOOT PAIN, UNSPECIFIED LATERALITY: ICD-10-CM

## 2023-07-18 DIAGNOSIS — M79.604 CHRONIC PAIN OF BOTH LOWER EXTREMITIES: ICD-10-CM

## 2023-07-18 DIAGNOSIS — M79.2 NEUROPATHIC PAIN OF FOOT, UNSPECIFIED LATERALITY: ICD-10-CM

## 2023-07-18 DIAGNOSIS — E11.42 DIABETIC POLYNEUROPATHY ASSOCIATED WITH TYPE 2 DIABETES MELLITUS (HCC): ICD-10-CM

## 2023-07-18 DIAGNOSIS — M79.605 CHRONIC PAIN OF BOTH LOWER EXTREMITIES: ICD-10-CM

## 2023-07-18 DIAGNOSIS — G89.29 CHRONIC PAIN OF BOTH LOWER EXTREMITIES: ICD-10-CM

## 2023-07-18 DIAGNOSIS — G89.29 CHRONIC FOOT PAIN, UNSPECIFIED LATERALITY: ICD-10-CM

## 2023-07-18 RX ORDER — BACLOFEN 20 MG/1
TABLET ORAL
Qty: 90 TABLET | Refills: 0 | OUTPATIENT
Start: 2023-07-18

## 2023-07-28 NOTE — TELEPHONE ENCOUNTER
Caller: Sarah Garcia     Doctor: Dr Matt Sifuentes    Reason for call: Patient returning RN call   Call back#: [phone]

## 2023-08-18 ENCOUNTER — TELEPHONE (OUTPATIENT)
Age: 53
End: 2023-08-18

## 2023-08-18 NOTE — TELEPHONE ENCOUNTER
Caller: chelsie    Doctor: antonio    Reason for call: pt wants to know if her appt on 9-5-23 can be virtual?    Call back#: [phone]

## 2023-08-18 NOTE — TELEPHONE ENCOUNTER
LVM about changing appt on 9/5/23 to a Virtual. I let the Pt know that Brayan Valencia never seen her so she would have to come in or reschedule to come in person.     Thank You

## 2023-08-27 DIAGNOSIS — G89.29 CHRONIC PAIN OF BOTH LOWER EXTREMITIES: ICD-10-CM

## 2023-08-27 DIAGNOSIS — M79.2 NEUROPATHIC PAIN OF FOOT, UNSPECIFIED LATERALITY: ICD-10-CM

## 2023-08-27 DIAGNOSIS — M79.605 CHRONIC PAIN OF BOTH LOWER EXTREMITIES: ICD-10-CM

## 2023-08-27 DIAGNOSIS — G89.29 CHRONIC FOOT PAIN, UNSPECIFIED LATERALITY: ICD-10-CM

## 2023-08-27 DIAGNOSIS — M79.604 CHRONIC PAIN OF BOTH LOWER EXTREMITIES: ICD-10-CM

## 2023-08-27 DIAGNOSIS — E11.42 DIABETIC POLYNEUROPATHY ASSOCIATED WITH TYPE 2 DIABETES MELLITUS (HCC): ICD-10-CM

## 2023-08-27 DIAGNOSIS — G89.4 CHRONIC PAIN SYNDROME: ICD-10-CM

## 2023-08-27 DIAGNOSIS — M79.673 CHRONIC FOOT PAIN, UNSPECIFIED LATERALITY: ICD-10-CM

## 2023-08-30 DIAGNOSIS — M79.2 NEUROPATHIC PAIN OF FOOT, UNSPECIFIED LATERALITY: ICD-10-CM

## 2023-08-30 DIAGNOSIS — E11.42 DIABETIC POLYNEUROPATHY ASSOCIATED WITH TYPE 2 DIABETES MELLITUS (HCC): ICD-10-CM

## 2023-08-30 DIAGNOSIS — M79.673 CHRONIC FOOT PAIN, UNSPECIFIED LATERALITY: ICD-10-CM

## 2023-08-30 DIAGNOSIS — G89.4 CHRONIC PAIN SYNDROME: ICD-10-CM

## 2023-08-30 DIAGNOSIS — G89.29 CHRONIC PAIN OF BOTH LOWER EXTREMITIES: ICD-10-CM

## 2023-08-30 DIAGNOSIS — M79.605 CHRONIC PAIN OF BOTH LOWER EXTREMITIES: ICD-10-CM

## 2023-08-30 DIAGNOSIS — G89.29 CHRONIC FOOT PAIN, UNSPECIFIED LATERALITY: ICD-10-CM

## 2023-08-30 DIAGNOSIS — M79.604 CHRONIC PAIN OF BOTH LOWER EXTREMITIES: ICD-10-CM

## 2023-08-31 ENCOUNTER — TELEPHONE (OUTPATIENT)
Dept: NEUROLOGY | Facility: CLINIC | Age: 53
End: 2023-08-31

## 2023-08-31 RX ORDER — NORTRIPTYLINE HYDROCHLORIDE 75 MG/1
CAPSULE ORAL
Qty: 30 CAPSULE | Refills: 0 | OUTPATIENT
Start: 2023-08-31

## 2023-08-31 RX ORDER — PREGABALIN 150 MG/1
CAPSULE ORAL
Qty: 90 CAPSULE | Refills: 0 | OUTPATIENT
Start: 2023-08-31

## 2023-08-31 RX ORDER — BACLOFEN 20 MG/1
TABLET ORAL
Qty: 90 TABLET | Refills: 0 | OUTPATIENT
Start: 2023-08-31

## 2023-08-31 NOTE — TELEPHONE ENCOUNTER
Received VM transcription:    Hi, my name is [facility] calling from [employer] [facility] in [facility] regarding Angel Luis Carreon. Date of birth [birth year]. She's scheduled for procedures with us. I'm just wondering if you can send us any recent office notes and testing she's had done  to [phone]. That's our fax number. If there's any questions or concerns, my phone number is [phone] extension [phone]. Thank you.

## 2023-09-01 RX ORDER — NORTRIPTYLINE HYDROCHLORIDE 75 MG/1
CAPSULE ORAL
Qty: 30 CAPSULE | Refills: 0 | OUTPATIENT
Start: 2023-09-01

## 2023-09-01 RX ORDER — BACLOFEN 20 MG/1
TABLET ORAL
Qty: 90 TABLET | Refills: 0 | OUTPATIENT
Start: 2023-09-01

## 2023-09-01 RX ORDER — PREGABALIN 150 MG/1
CAPSULE ORAL
Qty: 90 CAPSULE | Refills: 0 | OUTPATIENT
Start: 2023-09-01

## 2023-09-01 NOTE — TELEPHONE ENCOUNTER
Dr. Elsa Ellis - it doesn't appear that the OV note for 3/3 has been completed     Please let us know when it is completed so we can fax     Thank you

## 2023-09-05 ENCOUNTER — OFFICE VISIT (OUTPATIENT)
Dept: PAIN MEDICINE | Facility: CLINIC | Age: 53
End: 2023-09-05
Payer: COMMERCIAL

## 2023-09-05 VITALS
TEMPERATURE: 97.6 F | WEIGHT: 235 LBS | SYSTOLIC BLOOD PRESSURE: 120 MMHG | BODY MASS INDEX: 37.77 KG/M2 | HEART RATE: 95 BPM | DIASTOLIC BLOOD PRESSURE: 76 MMHG | HEIGHT: 66 IN

## 2023-09-05 DIAGNOSIS — G89.29 CHRONIC PAIN OF BOTH LOWER EXTREMITIES: Primary | ICD-10-CM

## 2023-09-05 DIAGNOSIS — M79.605 CHRONIC PAIN OF BOTH LOWER EXTREMITIES: Primary | ICD-10-CM

## 2023-09-05 DIAGNOSIS — E11.42 DIABETIC POLYNEUROPATHY ASSOCIATED WITH TYPE 2 DIABETES MELLITUS (HCC): ICD-10-CM

## 2023-09-05 DIAGNOSIS — M79.604 CHRONIC PAIN OF BOTH LOWER EXTREMITIES: Primary | ICD-10-CM

## 2023-09-05 DIAGNOSIS — G89.4 CHRONIC PAIN SYNDROME: ICD-10-CM

## 2023-09-05 PROCEDURE — 99214 OFFICE O/P EST MOD 30 MIN: CPT | Performed by: PHYSICIAN ASSISTANT

## 2023-09-05 RX ORDER — BACLOFEN 20 MG/1
TABLET ORAL
Qty: 90 TABLET | Refills: 2 | Status: SHIPPED | OUTPATIENT
Start: 2023-09-05

## 2023-09-05 RX ORDER — NORTRIPTYLINE HYDROCHLORIDE 75 MG/1
CAPSULE ORAL
Qty: 30 CAPSULE | Refills: 2 | Status: SHIPPED | OUTPATIENT
Start: 2023-09-05

## 2023-09-05 RX ORDER — PREGABALIN 200 MG/1
200 CAPSULE ORAL 3 TIMES DAILY
Qty: 90 CAPSULE | Refills: 2 | Status: SHIPPED | OUTPATIENT
Start: 2023-09-05

## 2023-09-05 NOTE — PROGRESS NOTES
Assessment:  1. Chronic pain of both lower extremities    2. Diabetic polyneuropathy associated with type 2 diabetes mellitus ([address])    3. Chronic pain syndrome        Plan:  While the patient was in the office today, I did have a thorough conversation regarding their chronic pain syndrome, medication management, and treatment plan options. After discussing options, I have recommended that we increase the pregabalin to 200 mg 3 times daily for further improvement in her neuropathic pain complaints. I have provided a refill on the nortriptyline 75 mg nightly as well as baclofen 10 mg 3 times daily as needed. Patient has an EMG of the upper extremities scheduled in October. The patient will follow-up in 12 weeks for medication prescription refill and reevaluation. The patient was advised to contact the office should their symptoms worsen in the interim. The patient was agreeable and verbalized an understanding. History of Present Illness: The patient is a 52 y.o. female last seen on 4/24/2023 who presents for a follow up office visit in regards to upper and lower extremity pain secondary to diabetic neuropathy. The patient currently reports increasing neuropathic pain of the lower and upper extremities. She rates her pain score an 8 out of 10 today and describes it as an intermittent sharp, cramping, shooting type of pain with numbness and paresthesias that gets worse in the evening and affects her ability to sleep. She has been maintained on pregabalin 150 mg 3 times daily, nortriptyline 75 mg nightly and baclofen 10 mg 3 times daily as needed. She is scheduled for an EMG of the upper extremities in October. I have personally reviewed and/or updated the patient's past medical history, past surgical history, family history, social history, current medications, allergies, and vital signs today. Review of Systems:    Review of Systems   Respiratory: Negative for shortness of breath. Cardiovascular: Negative for chest pain. Gastrointestinal: Negative for constipation, diarrhea, nausea and vomiting. Musculoskeletal: Positive for gait problem and joint swelling. Negative for arthralgias and myalgias. Skin: Negative for rash. Neurological: Positive for weakness. Negative for dizziness and seizures. All other systems reviewed and are negative. Past Medical History:   Diagnosis Date   • Asthma    • Diabetes mellitus ([address])    • History of angina    • Hyperlipidemia    • Hypertension        Past Surgical History:   Procedure Laterality Date   • BLADDER SURGERY         No family history on file.     Social History     Occupational History   • Not on file   Tobacco Use   • Smoking status: Former   • Smokeless tobacco: Never   Vaping Use   • Vaping Use: Never used   Substance and Sexual Activity   • Alcohol use: Never   • Drug use: Never   • Sexual activity: Not on file         Current Outpatient Medications:   •  atorvastatin (LIPITOR) 40 mg tablet, , Disp: , Rfl:   •  baclofen 20 mg tablet, Take 1 PO TID, Disp: 90 tablet, Rfl: 2  •  Farxiga 10 MG TABS, , Disp: , Rfl:   •  Lantus SoloStar 100 units/mL injection pen, , Disp: , Rfl:   •  lisinopril-hydrochlorothiazide (PRINZIDE,ZESTORETIC) 20-25 MG per tablet, , Disp: , Rfl:   •  nortriptyline (PAMELOR) 75 MG capsule, Take 1 PO HS, Disp: 30 capsule, Rfl: 2  •  pregabalin (LYRICA) 200 MG capsule, Take 1 capsule (200 mg total) by mouth 3 (three) times a day, Disp: 90 capsule, Rfl: 2  •  Trulicity 5.93 AC/2.6EL SOPN, Inject under the skin once a week As directed, Disp: , Rfl:   •  albuterol (PROVENTIL HFA,VENTOLIN HFA) 90 mcg/act inhaler, INHALE 1 PUFF BY MOUTH EVERY 4 HOURS AS NEEDED, Disp: , Rfl:   •  B-D UF III MINI PEN NEEDLES 31G X 5 MM MISC, , Disp: , Rfl:   •  ciclopirox (PENLAC) 8 % solution, Apply topically daily at bedtime, Disp: 6.6 mL, Rfl: 5  •  Continuous Blood Gluc Sensor (FreeStyle Mina 14 Day Sensor) MISC, Use as directed, Disp: , Rfl:   •  insulin aspart (NovoLOG FlexPen) 100 UNIT/ML injection pen, , Disp: , Rfl:   •  insulin lispro (HumaLOG) 100 units/mL injection pen, , Disp: , Rfl:   •  urea (CARMOL) 30 % cream, Apply topically 3 (three) times a day, Disp: 227 g, Rfl: 1    No Known Allergies    Physical Exam:    /76 (BP Location: Left arm, Patient Position: Sitting, Cuff Size: Large)   Pulse 95   Temp 97.6 °F (36.4 °C)   Ht 5' 6" (1.676 m)   Wt 107 kg (235 lb)   BMI 37.93 kg/m²     Constitutional:normal, well developed, well nourished, alert, in no distress and non-toxic and no overt pain behavior. and overweight  Eyes:anicteric  HEENT:grossly intact  Neck:supple, symmetric, trachea midline and no masses   Pulmonary:even and unlabored  Cardiovascular:No edema or pitting edema present  Skin:Normal without rashes or lesions and well hydrated  Psychiatric:Mood and affect appropriate  Neurologic:Cranial Nerves II-XII grossly intact  Musculoskeletal:normal      Imaging  No orders to display         No orders of the defined types were placed in this encounter.

## 2023-09-05 NOTE — TELEPHONE ENCOUNTER
Areli from [facility] [place] called in again about LOV notes.    Please assist.

## 2023-09-06 NOTE — TELEPHONE ENCOUNTER
Clearance form received from [address] [place] [address]; form completed, scanned into media, and faxed electronically at 2:23 pm today to [phone].

## 2023-09-07 NOTE — TELEPHONE ENCOUNTER
Jose Left from [address] [place] [address] Dr notes to be faxed before 09/12/23 as patient is having a procedure done that day Fax# [phone]

## 2023-10-25 ENCOUNTER — OFFICE VISIT (OUTPATIENT)
Dept: PODIATRY | Facility: CLINIC | Age: 53
End: 2023-10-25
Payer: COMMERCIAL

## 2023-10-25 VITALS
HEIGHT: 66 IN | DIASTOLIC BLOOD PRESSURE: 84 MMHG | HEART RATE: 106 BPM | SYSTOLIC BLOOD PRESSURE: 146 MMHG | BODY MASS INDEX: 37.93 KG/M2

## 2023-10-25 DIAGNOSIS — Z79.4 TYPE 2 DIABETES MELLITUS WITH DIABETIC NEUROPATHY, WITH LONG-TERM CURRENT USE OF INSULIN (HCC): Primary | ICD-10-CM

## 2023-10-25 DIAGNOSIS — E11.40 TYPE 2 DIABETES MELLITUS WITH DIABETIC NEUROPATHY, WITH LONG-TERM CURRENT USE OF INSULIN (HCC): Primary | ICD-10-CM

## 2023-10-25 DIAGNOSIS — B35.1 ONYCHOMYCOSIS: ICD-10-CM

## 2023-10-25 PROCEDURE — 99213 OFFICE O/P EST LOW 20 MIN: CPT | Performed by: PODIATRIST

## 2023-10-25 RX ORDER — TERBINAFINE HYDROCHLORIDE 250 MG/1
250 TABLET ORAL DAILY
Qty: 14 TABLET | Refills: 5 | Status: SHIPPED | OUTPATIENT
Start: 2023-10-25 | End: 2024-01-17

## 2023-10-25 NOTE — PROGRESS NOTES
PATIENT:  Floyd     1970      ASSESSMENT:     1. Type 2 diabetes mellitus with diabetic neuropathy, with long-term current use of insulin ([address])        2. Onychomycosis  terbinafine (LamISIL) 250 mg tablet            PLAN:  1. Patient was counseled on the condition and diagnosis. 2.  Educated disease prevention and risks related to diabetes. 3.  Educated proper daily foot care and exam.  Instructed proper skin care / protection and footwear. Instructed to identify any signs of infection and related foot problem. 4.  She has painful neuropathy in her feet. Reviewed/ discussed blood work. The last HbA1c was 11.4. Instructed her to control tight management of BS to decreased symptoms. Continue Lyrica and pain management follow-up. 5. Restart Pulse dose Lamisil for nail fungus. Discussed possible side effects and risks. 6. Rx: Urea cream for fissures. 7. She will return in 6 months      Subjective:          HPI  The patient presents for diabetic foot exam.  She has painful neuropathy, nail fungus, and dry skin. She has shooting, throbbing, and pins/needles. She has numbness and cold sensation. She is on Lyrica. The blood glucose is still high. She complained of discoloration of toenails. The following portions of the patient's history were reviewed and updated as appropriate: allergies, current medications, past family history, past medical history, past social history, past surgical history and problem list.  All pertinent labs and images were reviewed. Past Medical History  Past Medical History:   Diagnosis Date   • Asthma    • Diabetes mellitus ([address])    • History of angina    • Hyperlipidemia    • Hypertension        Past Surgical History  Past Surgical History:   Procedure Laterality Date   • BLADDER SURGERY          Allergies:  Patient has no known allergies.     Medications:  Current Outpatient Medications   Medication Sig Dispense Refill   • albuterol (PROVENTIL HFA,VENTOLIN HFA) 90 mcg/act inhaler INHALE 1 PUFF BY MOUTH EVERY 4 HOURS AS NEEDED     • atorvastatin (LIPITOR) 40 mg tablet      • B-D UF III MINI PEN NEEDLES 31G X 5 MM MISC      • baclofen 20 mg tablet Take 1 PO TID 90 tablet 2   • ciclopirox (PENLAC) 8 % solution Apply topically daily at bedtime 6.6 mL 5   • Continuous Blood Gluc Sensor (FreeStyle Mina 14 Day Sensor) MISC Use as directed     • Farxiga 10 MG TABS      • insulin aspart (NovoLOG FlexPen) 100 UNIT/ML injection pen      • insulin lispro (HumaLOG) 100 units/mL injection pen      • Lantus SoloStar 100 units/mL injection pen      • lisinopril-hydrochlorothiazide (PRINZIDE,ZESTORETIC) 20-25 MG per tablet      • nortriptyline (PAMELOR) 75 MG capsule Take 1 PO HS 30 capsule 2   • pregabalin (LYRICA) 200 MG capsule Take 1 capsule (200 mg total) by mouth 3 (three) times a day 90 capsule 2   • terbinafine (LamISIL) 250 mg tablet Take 1 tablet (250 mg total) by mouth daily 14 tablet 5   • Trulicity 8.16 MV/9.7MM SOPN Inject under the skin once a week As directed     • urea (CARMOL) 30 % cream Apply topically 3 (three) times a day 227 g 1     No current facility-administered medications for this visit. Social History:  Social History     Socioeconomic History   • Marital status:       Spouse name: None   • Number of children: None   • Years of education: None   • Highest education level: None   Occupational History   • None   Tobacco Use   • Smoking status: Former   • Smokeless tobacco: Never   Vaping Use   • Vaping Use: Never used   Substance and Sexual Activity   • Alcohol use: Never   • Drug use: Never   • Sexual activity: None   Other Topics Concern   • None   Social History Narrative   • None     Social Determinants of Health     Financial Resource Strain: Not on file   Food Insecurity: Not on file   Transportation Needs: Not on file   Physical Activity: Not on file   Stress: Not on file   Social Connections: Not on file   Intimate Partner Violence: Not on file   Housing Stability: Not on file        Review of Systems   Constitutional:  Negative for appetite change, chills and fever. HENT:  Negative for sore throat. Respiratory:  Negative for cough and shortness of breath. Cardiovascular:  Negative for chest pain. Gastrointestinal:  Negative for diarrhea, nausea and vomiting. Musculoskeletal:  Negative for joint swelling. Skin:  Negative for rash and wound. Neurological:  Positive for numbness. Negative for weakness. Hematological:  Does not bruise/bleed easily. Psychiatric/Behavioral:  Negative for behavioral problems and confusion. Objective:      /84   Pulse (!) 106   Ht 5' 6" (1.676 m)   BMI 37.93 kg/m²          Physical Exam  Vitals reviewed. Constitutional:       General: She is not in acute distress. Appearance: Normal appearance. She is not ill-appearing. HENT:      Head: Normocephalic and atraumatic. Eyes:      Extraocular Movements: Extraocular movements intact. Cardiovascular:      Rate and Rhythm: Normal rate and regular rhythm. Pulses: Pulses are weak. Dorsalis pedis pulses are 1+ on the right side and 1+ on the left side. Posterior tibial pulses are 0 on the right side and 0 on the left side. Comments: No ischemia. Pulmonary:      Effort: Pulmonary effort is normal. No respiratory distress. Musculoskeletal:         General: No swelling, tenderness, deformity or signs of injury. Cervical back: Normal range of motion and neck supple. Right lower leg: No edema. Left lower leg: No edema. Right foot: No Charcot foot or foot drop. Left foot: No Charcot foot or foot drop. Comments: Mild hammertoes. Feet:      Right foot:      Protective Sensation: 10 sites tested. 0 sites sensed. Skin integrity: Dry skin present. No ulcer or erythema. Left foot:      Protective Sensation: 10 sites tested. 0 sites sensed.       Skin integrity: Dry skin present. No ulcer or erythema. Skin:     General: Skin is warm and dry. Capillary Refill: Capillary refill takes less than 2 seconds. Coloration: Skin is not cyanotic or mottled. Findings: No abscess, ecchymosis, erythema, rash or wound. Nails: There is no clubbing. Comments: Mild discoloration and thickening of distal nail plates. Neurological:      General: No focal deficit present. Mental Status: She is alert and oriented to person, place, and time. Cranial Nerves: No cranial nerve deficit. Sensory: Sensory deficit present. Motor: No weakness. Coordination: Coordination normal.   Psychiatric:         Mood and Affect: Mood normal.         Behavior: Behavior normal.         Thought Content: Thought content normal.         Judgment: Judgment normal.           Diabetic Foot Exam    Patient's shoes and socks removed. Right Foot/Ankle   Right Foot Inspection  Skin Exam: skin intact and dry skin. No erythema, no pre-ulcer and no ulcer. Toe Exam: No swelling, no tenderness, erythema and  no right toe deformity    Sensory   Vibration: absent  Proprioception: intact  Monofilament testing: absent    Vascular  Capillary refills: < 3 seconds  The right DP pulse is 1+. The right PT pulse is 0. Left Foot/Ankle  Left Foot Inspection  Skin Exam: skin intact and dry skin. No erythema, no pre-ulcer and no ulcer. Toe Exam: No swelling, no tenderness, no erythema and no left toe deformity. Sensory   Vibration: absent  Proprioception: intact  Monofilament testing: absent    Vascular  Capillary refills: < 3 seconds  The left DP pulse is 1+. The left PT pulse is 0.      Assign Risk Category  No deformity present  Loss of protective sensation  Weak pulses  Risk: 2

## 2023-12-12 ENCOUNTER — TELEPHONE (OUTPATIENT)
Age: 53
End: 2023-12-12

## 2023-12-12 NOTE — TELEPHONE ENCOUNTER
Caller: chelsie Knight    Doctor: Mahamed Savage    Reason for call: pt is scheduled for on OV on 1/10/24. Pt will be out of medication before her appt. Pt needs pregabalin (LYRICA) 200 MG capsule , nortriptyline (PAMELOR) 75 MG capsule , baclofen 20 mg tablet. To be refilled.  Please advise    [address] [place] [address] [address]  [address], [address] [place] [address]  Phone: [phone]  Fax: [phone]  KADI #: --       Call back#: [phone]

## 2023-12-18 ENCOUNTER — TELEPHONE (OUTPATIENT)
Dept: PAIN MEDICINE | Facility: CLINIC | Age: 53
End: 2023-12-18

## 2023-12-18 NOTE — TELEPHONE ENCOUNTER
Patient called, called in for refills on 3 prescription(Nortriptyline 75 mg, Pregabalin 200 mg and Baclofen 20 mg   on 12/13/23 pharmacy did not receive yet. She has broken ankle and needs to have someone take her to pharmacy she would like office to call her in regards to refills [phone]

## 2023-12-22 DIAGNOSIS — M79.604 CHRONIC PAIN OF BOTH LOWER EXTREMITIES: ICD-10-CM

## 2023-12-22 DIAGNOSIS — G89.4 CHRONIC PAIN SYNDROME: ICD-10-CM

## 2023-12-22 DIAGNOSIS — G89.29 CHRONIC PAIN OF BOTH LOWER EXTREMITIES: ICD-10-CM

## 2023-12-22 DIAGNOSIS — M79.605 CHRONIC PAIN OF BOTH LOWER EXTREMITIES: ICD-10-CM

## 2023-12-22 DIAGNOSIS — E11.42 DIABETIC POLYNEUROPATHY ASSOCIATED WITH TYPE 2 DIABETES MELLITUS (HCC): ICD-10-CM

## 2023-12-22 RX ORDER — NORTRIPTYLINE HYDROCHLORIDE 75 MG/1
CAPSULE ORAL
Qty: 30 CAPSULE | Refills: 0 | Status: SHIPPED | OUTPATIENT
Start: 2023-12-22

## 2023-12-22 RX ORDER — BACLOFEN 20 MG/1
TABLET ORAL
Qty: 90 TABLET | Refills: 0 | Status: SHIPPED | OUTPATIENT
Start: 2023-12-22

## 2023-12-22 RX ORDER — PREGABALIN 200 MG/1
200 CAPSULE ORAL 3 TIMES DAILY
Qty: 90 CAPSULE | Refills: 0 | Status: SHIPPED | OUTPATIENT
Start: 2023-12-22

## 2023-12-22 NOTE — TELEPHONE ENCOUNTER
Caller: pharmacy    Doctor: DANAE    Reason for call: called back stated they are all out of refill, will need another order resend to them for the three script    Call back#:

## 2023-12-22 NOTE — TELEPHONE ENCOUNTER
Caller: chelsie Arango    Doctor: Yamel    Reason for call: pt stated the pharmacy told her to call the office because the prescription has been denied. Please Advise. Pt is very concerned because the weekend is coming and she needs her medication.    Call back#: [phone]

## 2024-01-10 ENCOUNTER — OFFICE VISIT (OUTPATIENT)
Dept: PAIN MEDICINE | Facility: CLINIC | Age: 54
End: 2024-01-10
Payer: COMMERCIAL

## 2024-01-10 VITALS
TEMPERATURE: 98.1 F | BODY MASS INDEX: 37.93 KG/M2 | DIASTOLIC BLOOD PRESSURE: 82 MMHG | HEART RATE: 102 BPM | HEIGHT: 66 IN | SYSTOLIC BLOOD PRESSURE: 136 MMHG

## 2024-01-10 DIAGNOSIS — G89.4 CHRONIC PAIN SYNDROME: ICD-10-CM

## 2024-01-10 DIAGNOSIS — G89.29 CHRONIC PAIN OF BOTH LOWER EXTREMITIES: ICD-10-CM

## 2024-01-10 DIAGNOSIS — E11.42 DIABETIC POLYNEUROPATHY ASSOCIATED WITH TYPE 2 DIABETES MELLITUS (HCC): Primary | ICD-10-CM

## 2024-01-10 DIAGNOSIS — M79.604 CHRONIC PAIN OF BOTH LOWER EXTREMITIES: ICD-10-CM

## 2024-01-10 DIAGNOSIS — M79.605 CHRONIC PAIN OF BOTH LOWER EXTREMITIES: ICD-10-CM

## 2024-01-10 PROCEDURE — 99214 OFFICE O/P EST MOD 30 MIN: CPT | Performed by: PHYSICIAN ASSISTANT

## 2024-01-10 RX ORDER — DULOXETIN HYDROCHLORIDE 30 MG/1
30 CAPSULE, DELAYED RELEASE ORAL DAILY
Qty: 30 CAPSULE | Refills: 5 | Status: SHIPPED | OUTPATIENT
Start: 2024-01-10

## 2024-01-10 RX ORDER — BACLOFEN 20 MG/1
TABLET ORAL
Qty: 90 TABLET | Refills: 5 | Status: SHIPPED | OUTPATIENT
Start: 2024-01-10

## 2024-01-10 RX ORDER — PREGABALIN 200 MG/1
200 CAPSULE ORAL 3 TIMES DAILY
Qty: 90 CAPSULE | Refills: 5 | Status: SHIPPED | OUTPATIENT
Start: 2024-01-10

## 2024-01-10 RX ORDER — NORTRIPTYLINE HYDROCHLORIDE 75 MG/1
CAPSULE ORAL
Qty: 30 CAPSULE | Refills: 5 | Status: SHIPPED | OUTPATIENT
Start: 2024-01-10

## 2024-01-10 NOTE — PROGRESS NOTES
Assessment:  1. Diabetic polyneuropathy associated with type 2 diabetes mellitus (HCC)    2. Chronic pain of both lower extremities    3. Chronic pain syndrome        Plan:  While the patient was in the office today, I did have a thorough conversation regarding their chronic pain syndrome, medication management, and treatment plan options.    After discussing options and due to the patient's increasing neuropathic pain, I have recommended a trial of low-dose 30 mg duloxetine once a day.  If she reports benefit then I would consider increasing the dose and possibly decreasing the pregabalin.  For now continue pregabalin 200 mg 3 times daily, baclofen and nortriptyline as prescribed.  I have provided refills for the next 6 months.    Patient will be going for left foot surgery in the near future at [facility].  She is currently nonweightbearing and that we will continue 4 months after the surgery.    The patient will follow-up in 6 months for medication prescription refill and reevaluation. The patient was advised to contact the office should their symptoms worsen in the interim. The patient was agreeable and verbalized an understanding.        History of Present Illness:    The patient is a 53 y.o. female last seen on 9/5/2023 who presents for a follow up office visit in regards to chronic neuropathic pain of the lower extremity secondary to diabetic peripheral neuropathy.  The patient currently reports chronic upper extremity and lower extremity neuropathic pain that she presently rates an 8 out of 10 and describes it as an intermittent sharp, shooting pain with associated numbness and paresthesias.  The pain is significantly worse in the evening and she reports difficulty sleeping despite the medications.  Unfortunately since we last saw her, she fractured her left foot which now requires surgical intervention with hardware placement.  This injury has significant increased the pain.  She is  nonweightbearing.    Current pain medications includes: Lyrica 200 mg 3 times daily, nortriptyline 75 mg nightly and baclofen 20 mg 3 times daily as needed.  The patient reports that this regimen is providing 40% pain relief.  The patient is reporting no side effects from this pain medication regimen.    I have personally reviewed and/or updated the patient's past medical history, past surgical history, family history, social history, current medications, allergies, and vital signs today.       Review of Systems:    Review of Systems   Respiratory:  Negative for shortness of breath.    Cardiovascular:  Negative for chest pain.   Gastrointestinal:  Negative for constipation, diarrhea, nausea and vomiting.   Musculoskeletal:  Positive for gait problem and joint swelling (Joint stiffness). Negative for arthralgias and myalgias.   Skin:  Negative for rash.   Neurological:  Positive for weakness. Negative for dizziness and seizures.   All other systems reviewed and are negative.        Past Medical History:   Diagnosis Date   • Asthma    • Diabetes mellitus (HCC)    • History of angina    • Hyperlipidemia    • Hypertension        Past Surgical History:   Procedure Laterality Date   • BLADDER SURGERY         No family history on file.    Social History     Occupational History   • Not on file   Tobacco Use   • Smoking status: Former   • Smokeless tobacco: Never   Vaping Use   • Vaping status: Never Used   Substance and Sexual Activity   • Alcohol use: Never   • Drug use: Never   • Sexual activity: Not on file         Current Outpatient Medications:   •  atorvastatin (LIPITOR) 40 mg tablet, , Disp: , Rfl:   •  baclofen 20 mg tablet, Take 1 PO TID, Disp: 90 tablet, Rfl: 5  •  DULoxetine (CYMBALTA) 30 mg delayed release capsule, Take 1 capsule (30 mg total) by mouth daily, Disp: 30 capsule, Rfl: 5  •  Farxiga 10 MG TABS, , Disp: , Rfl:   •  insulin aspart (NovoLOG FlexPen) 100 UNIT/ML injection pen, , Disp: , Rfl:   •   "lisinopril-hydrochlorothiazide (PRINZIDE,ZESTORETIC) 20-25 MG per tablet, , Disp: , Rfl:   •  nortriptyline (PAMELOR) 75 MG capsule, Take 1 PO HS, Disp: 30 capsule, Rfl: 5  •  pregabalin (LYRICA) 200 MG capsule, Take 1 capsule (200 mg total) by mouth 3 (three) times a day, Disp: 90 capsule, Rfl: 5  •  Trulicity 0.75 MG/0.5ML SOPN, Inject under the skin once a week As directed, Disp: , Rfl:   •  albuterol (PROVENTIL HFA,VENTOLIN HFA) 90 mcg/act inhaler, INHALE 1 PUFF BY MOUTH EVERY 4 HOURS AS NEEDED, Disp: , Rfl:   •  B-D UF III MINI PEN NEEDLES 31G X 5 MM MISC, , Disp: , Rfl:   •  ciclopirox (PENLAC) 8 % solution, Apply topically daily at bedtime, Disp: 6.6 mL, Rfl: 5  •  Continuous Blood Gluc Sensor (FreeStyle Mina 14 Day Sensor) MISC, Use as directed, Disp: , Rfl:   •  insulin lispro (HumaLOG) 100 units/mL injection pen, , Disp: , Rfl:   •  Lantus SoloStar 100 units/mL injection pen, , Disp: , Rfl:   •  terbinafine (LamISIL) 250 mg tablet, Take 1 tablet (250 mg total) by mouth daily (Patient not taking: Reported on 1/10/2024), Disp: 14 tablet, Rfl: 5  •  urea (CARMOL) 30 % cream, Apply topically 3 (three) times a day, Disp: 227 g, Rfl: 1    No Known Allergies    Physical Exam:    /82 (BP Location: Left arm, Patient Position: Sitting, Cuff Size: Large)   Pulse 102   Temp 98.1 °F (36.7 °C)   Ht 5' 6\" (1.676 m)   BMI 37.93 kg/m²     Constitutional:normal, well developed, well nourished, alert, in no distress and non-toxic and no overt pain behavior. and overweight  Eyes:anicteric  HEENT:grossly intact  Neck:supple, symmetric, trachea midline and no masses   Pulmonary:even and unlabored  Cardiovascular:No edema or pitting edema present  Skin:Normal without rashes or lesions and well hydrated  Psychiatric:Mood and affect appropriate  Neurologic:Cranial Nerves II-XII grossly intact  Musculoskeletal: Patient using scooter, nonweightbearing, left foot in boot      Imaging  No orders to display         No orders " of the defined types were placed in this encounter.

## 2024-05-13 ENCOUNTER — TELEPHONE (OUTPATIENT)
Dept: PAIN MEDICINE | Facility: CLINIC | Age: 54
End: 2024-05-13

## 2024-05-13 NOTE — TELEPHONE ENCOUNTER
LVM to let Pt know that we are moving up appt from 10am arrival to 9:45am arrival on 6/26    Pt will only call back if she can not do 9:45arrival

## 2024-06-26 ENCOUNTER — OFFICE VISIT (OUTPATIENT)
Dept: PAIN MEDICINE | Facility: CLINIC | Age: 54
End: 2024-06-26
Payer: COMMERCIAL

## 2024-06-26 VITALS
TEMPERATURE: 98.2 F | HEART RATE: 78 BPM | BODY MASS INDEX: 37.93 KG/M2 | SYSTOLIC BLOOD PRESSURE: 134 MMHG | DIASTOLIC BLOOD PRESSURE: 78 MMHG | HEIGHT: 66 IN

## 2024-06-26 DIAGNOSIS — M25.572 CHRONIC PAIN OF LEFT ANKLE: ICD-10-CM

## 2024-06-26 DIAGNOSIS — M79.604 CHRONIC PAIN OF BOTH LOWER EXTREMITIES: ICD-10-CM

## 2024-06-26 DIAGNOSIS — G89.29 CHRONIC PAIN OF LEFT ANKLE: ICD-10-CM

## 2024-06-26 DIAGNOSIS — M79.605 CHRONIC PAIN OF BOTH LOWER EXTREMITIES: ICD-10-CM

## 2024-06-26 DIAGNOSIS — G89.29 CHRONIC PAIN OF BOTH LOWER EXTREMITIES: ICD-10-CM

## 2024-06-26 DIAGNOSIS — M79.18 MYOFASCIAL PAIN SYNDROME: ICD-10-CM

## 2024-06-26 DIAGNOSIS — E11.42 DIABETIC POLYNEUROPATHY ASSOCIATED WITH TYPE 2 DIABETES MELLITUS (HCC): Primary | ICD-10-CM

## 2024-06-26 DIAGNOSIS — G89.4 CHRONIC PAIN SYNDROME: ICD-10-CM

## 2024-06-26 PROCEDURE — 99214 OFFICE O/P EST MOD 30 MIN: CPT | Performed by: PHYSICIAN ASSISTANT

## 2024-06-26 RX ORDER — PREGABALIN 200 MG/1
200 CAPSULE ORAL 3 TIMES DAILY
Qty: 90 CAPSULE | Refills: 2 | Status: SHIPPED | OUTPATIENT
Start: 2024-06-26

## 2024-06-26 RX ORDER — DULOXETIN HYDROCHLORIDE 60 MG/1
60 CAPSULE, DELAYED RELEASE ORAL DAILY
Qty: 30 CAPSULE | Refills: 2 | Status: SHIPPED | OUTPATIENT
Start: 2024-06-26 | End: 2024-06-26 | Stop reason: SDUPTHER

## 2024-06-26 RX ORDER — DULOXETIN HYDROCHLORIDE 60 MG/1
60 CAPSULE, DELAYED RELEASE ORAL DAILY
Qty: 30 CAPSULE | Refills: 2 | Status: SHIPPED | OUTPATIENT
Start: 2024-06-26

## 2024-06-26 RX ORDER — TIZANIDINE 4 MG/1
4 TABLET ORAL 3 TIMES DAILY PRN
Qty: 90 TABLET | Refills: 2 | Status: SHIPPED | OUTPATIENT
Start: 2024-06-26

## 2024-06-26 NOTE — PROGRESS NOTES
Assessment:  1. Diabetic polyneuropathy associated with type 2 diabetes mellitus (HCC)    2. Chronic pain of both lower extremities    3. Chronic pain of left ankle    4. Myofascial pain syndrome    5. Chronic pain syndrome        Plan:  While the patient was in the office today, I did have a thorough conversation regarding their chronic pain syndrome, medication management, and treatment plan options.    After discussing options, I have recommended that we increase the duloxetine from 30 mg to 60 mg daily.  We will discontinue baclofen and proceed with tizanidine 4 mg 3 times daily as needed.  I advised the patient that they should not drive or operate machinery while on this medication until they see how it affects them, as it could cause lethargy and mental cloudiness. I advised the patient to call our office if they experience any side effects or issues with the medication changes. The patient verbalized an understanding.  For now she will continue the pregabalin 200 mg 3 times daily.  Will consider weaning this medication if she finds the duloxetine to be beneficial.    Patient will continue to follow-up with her foot and ankle orthopedic surgeon.  Unfortunately she has had significant increase in neuropathic pain since the multiple fractures and subsequent ORIF.    [place] Prescription Drug Monitoring Program report was reviewed and was appropriate     The patient will follow-up in 12 weeks for medication prescription refill and reevaluation. The patient was advised to contact the office should their symptoms worsen in the interim. The patient was agreeable and verbalized an understanding.        History of Present Illness:    The patient is a 54 y.o. female last seen on 1/10/2024 who presents for a follow up office visit in regards to chronic neuropathic pain of bilateral lower extremities secondary to diabetic polyneuropathy.  The patient currently reports a significant increase in left lower extremity  pain since suffering a left ankle injury/spiral fracture of her left tibia/trimalleolar fracture.  She was ambulating with a boot and crutches as well as a knee scooter with subsequent surgical intervention at [facility].  Unfortunately the patient states that her pain has become much worse in the left leg/ankle/foot.  Her current pain score is a 10 out of 10 and is described as a constant sharp, throbbing and burning type of pain.  Patient continues with bilateral numbness and paresthesias intermittently as well.    Current pain medications includes: Pregabalin 200 mg 3 times daily, duloxetine 30 mg daily and baclofen 20 mg 3 times daily as needed  .  The patient reports that this regimen is providing 20% pain relief.  The patient is reporting no side effects from this pain medication regimen.    I have personally reviewed and/or updated the patient's past medical history, past surgical history, family history, social history, current medications, allergies, and vital signs today.       Review of Systems:    Review of Systems   Respiratory:  Negative for shortness of breath.    Cardiovascular:  Negative for chest pain.   Gastrointestinal:  Negative for constipation, diarrhea, nausea and vomiting.   Musculoskeletal:  Positive for gait problem and joint swelling (Joint stiffness). Negative for arthralgias and myalgias.   Skin:  Negative for rash.   Neurological:  Positive for weakness. Negative for dizziness and seizures.   All other systems reviewed and are negative.        Past Medical History:   Diagnosis Date   • Asthma    • Diabetes mellitus (HCC)    • History of angina    • Hyperlipidemia    • Hypertension        Past Surgical History:   Procedure Laterality Date   • BLADDER SURGERY         History reviewed. No pertinent family history.    Social History     Occupational History   • Not on file   Tobacco Use   • Smoking status: Former   • Smokeless tobacco: Never   Vaping Use   • Vaping status: Never Used  "  Substance and Sexual Activity   • Alcohol use: Never   • Drug use: Never   • Sexual activity: Not on file         Current Outpatient Medications:   •  albuterol (PROVENTIL HFA,VENTOLIN HFA) 90 mcg/act inhaler, INHALE 1 PUFF BY MOUTH EVERY 4 HOURS AS NEEDED, Disp: , Rfl:   •  atorvastatin (LIPITOR) 40 mg tablet, , Disp: , Rfl:   •  ciclopirox (PENLAC) 8 % solution, Apply topically daily at bedtime, Disp: 6.6 mL, Rfl: 5  •  DULoxetine (CYMBALTA) 60 mg delayed release capsule, Take 1 capsule (60 mg total) by mouth daily, Disp: 30 capsule, Rfl: 2  •  Farxiga 10 MG TABS, , Disp: , Rfl:   •  insulin aspart (NovoLOG FlexPen) 100 UNIT/ML injection pen, , Disp: , Rfl:   •  lisinopril-hydrochlorothiazide (PRINZIDE,ZESTORETIC) 20-25 MG per tablet, , Disp: , Rfl:   •  nortriptyline (PAMELOR) 75 MG capsule, Take 1 PO HS, Disp: 30 capsule, Rfl: 5  •  pregabalin (LYRICA) 200 MG capsule, Take 1 capsule (200 mg total) by mouth 3 (three) times a day, Disp: 90 capsule, Rfl: 2  •  tiZANidine (ZANAFLEX) 4 mg tablet, Take 1 tablet (4 mg total) by mouth 3 (three) times a day as needed for muscle spasms, Disp: 90 tablet, Rfl: 2  •  Trulicity 0.75 MG/0.5ML SOPN, Inject under the skin once a week As directed, Disp: , Rfl:   •  urea (CARMOL) 30 % cream, Apply topically 3 (three) times a day, Disp: 227 g, Rfl: 1  •  B-D UF III MINI PEN NEEDLES 31G X 5 MM MISC, , Disp: , Rfl:   •  Continuous Blood Gluc Sensor (FreeStyle Mina 14 Day Sensor) MISC, Use as directed, Disp: , Rfl:   •  insulin lispro (HumaLOG) 100 units/mL injection pen, , Disp: , Rfl:   •  Lantus SoloStar 100 units/mL injection pen, , Disp: , Rfl:     No Known Allergies    Physical Exam:    /78 (BP Location: Left arm, Patient Position: Sitting, Cuff Size: Large)   Pulse 78   Temp 98.2 °F (36.8 °C)   Ht 5' 6\" (1.676 m)   BMI 37.93 kg/m²     Constitutional:normal, well developed, well nourished, alert, in no distress and non-toxic and no overt pain behavior. and " obese  Eyes:anicteric  HEENT:grossly intact  Neck:supple, symmetric, trachea midline and no masses   Pulmonary:even and unlabored  Cardiovascular:No edema or pitting edema present  Skin:Normal without rashes or lesions and well hydrated  Psychiatric:Mood and affect appropriate  Neurologic:Cranial Nerves II-XII grossly intact  Musculoskeletal: Left lower extremity edema, surgical scars, healed, limited range of motion of the ankle, gait is antalgic with the use of a cane.      Imaging  No orders to display         No orders of the defined types were placed in this encounter.

## 2024-09-18 ENCOUNTER — OFFICE VISIT (OUTPATIENT)
Dept: PAIN MEDICINE | Facility: CLINIC | Age: 54
End: 2024-09-18
Payer: COMMERCIAL

## 2024-09-18 VITALS
WEIGHT: 293 LBS | OXYGEN SATURATION: 98 % | TEMPERATURE: 97.7 F | HEIGHT: 66 IN | BODY MASS INDEX: 47.09 KG/M2 | SYSTOLIC BLOOD PRESSURE: 156 MMHG | HEART RATE: 92 BPM | DIASTOLIC BLOOD PRESSURE: 82 MMHG

## 2024-09-18 DIAGNOSIS — G89.29 CHRONIC PAIN OF BOTH LOWER EXTREMITIES: ICD-10-CM

## 2024-09-18 DIAGNOSIS — F51.01 PRIMARY INSOMNIA: ICD-10-CM

## 2024-09-18 DIAGNOSIS — M79.604 CHRONIC PAIN OF BOTH LOWER EXTREMITIES: ICD-10-CM

## 2024-09-18 DIAGNOSIS — M79.18 MYOFASCIAL PAIN SYNDROME: ICD-10-CM

## 2024-09-18 DIAGNOSIS — M46.1 SACROILIITIS (HCC): Primary | ICD-10-CM

## 2024-09-18 DIAGNOSIS — M79.605 CHRONIC PAIN OF BOTH LOWER EXTREMITIES: ICD-10-CM

## 2024-09-18 DIAGNOSIS — E11.42 DIABETIC POLYNEUROPATHY ASSOCIATED WITH TYPE 2 DIABETES MELLITUS (HCC): ICD-10-CM

## 2024-09-18 PROCEDURE — 99214 OFFICE O/P EST MOD 30 MIN: CPT | Performed by: PHYSICIAN ASSISTANT

## 2024-09-18 PROCEDURE — 20552 NJX 1/MLT TRIGGER POINT 1/2: CPT | Performed by: PHYSICIAN ASSISTANT

## 2024-09-18 RX ORDER — LIDOCAINE HYDROCHLORIDE 10 MG/ML
4 INJECTION, SOLUTION INFILTRATION; PERINEURAL ONCE
Status: COMPLETED | OUTPATIENT
Start: 2024-09-18 | End: 2024-09-18

## 2024-09-18 RX ORDER — METHYLPREDNISOLONE ACETATE 80 MG/ML
40 INJECTION, SUSPENSION INTRA-ARTICULAR; INTRALESIONAL; INTRAMUSCULAR; SOFT TISSUE ONCE
Status: COMPLETED | OUTPATIENT
Start: 2024-09-18 | End: 2024-09-18

## 2024-09-18 RX ORDER — DULOXETIN HYDROCHLORIDE 60 MG/1
60 CAPSULE, DELAYED RELEASE ORAL DAILY
Qty: 30 CAPSULE | Refills: 2 | Status: SHIPPED | OUTPATIENT
Start: 2024-09-18

## 2024-09-18 RX ORDER — PROCHLORPERAZINE 25 MG/1
SUPPOSITORY RECTAL
COMMUNITY
Start: 2024-07-15

## 2024-09-18 RX ORDER — PREGABALIN 200 MG/1
200 CAPSULE ORAL 3 TIMES DAILY
Qty: 90 CAPSULE | Refills: 2 | Status: SHIPPED | OUTPATIENT
Start: 2024-09-18

## 2024-09-18 RX ADMIN — LIDOCAINE HYDROCHLORIDE 4 ML: 10 INJECTION, SOLUTION INFILTRATION; PERINEURAL at 11:42

## 2024-09-18 RX ADMIN — METHYLPREDNISOLONE ACETATE 40 MG: 80 INJECTION, SUSPENSION INTRA-ARTICULAR; INTRALESIONAL; INTRAMUSCULAR; SOFT TISSUE at 11:41

## 2024-09-18 NOTE — PROGRESS NOTES
Assessment:  1. Sacroiliitis (HCC)    2. Myofascial pain syndrome    3. Diabetic polyneuropathy associated with type 2 diabetes mellitus (HCC)    4. Chronic pain of both lower extremities    5. Primary insomnia        Plan:  While the patient was in the office today, I did have a thorough conversation regarding their chronic pain syndrome, medication management, and treatment plan options.    After discussing options, the patient will proceed with trigger point injection today to address the myofascial component of her pain pattern.    Procedure: Trigger Point Injection.      Procedure Note:  After fully informed written consent was obtained, the above procedure was performed.   Using aseptic technique a 25-gauge needle was placed in the region of the right lumbosacral paraspinal muscles.  Approximately 4 cc of 1% Lidocaine and 40 mg of Depo-Medrol was injected in a fan-like fashion after negative aspiration with each cc.       Complications:  None.      Disposition: Motor function was intact. Vital signs stable. The patient was discharged home.  The discharge instruction sheet was given to the patient.  The patient was discharged with instructions to call immediately if there are any complications.     I also believe there is an underlying issue of sacroiliac joint dysfunction/sacroiliitis secondary to her altered gait pattern with her left lower extremity injury and now leg length discrepancy.: Based on patient report and physical exam, the patient's symptomatology does seem to be consistent with sacroiliac mediated pain from sacroiliitis. We will schedule the patient for a right SIJ injection to decrease any inflammatory component of the patient's pain symptoms.  Patient was made aware that injections can increase blood sugars temporarily.  Patient has a glucose monitor and the ability to adjust insulin as needed.  Her last hemoglobin A1c was 7.4.    Continue medication management including pregabalin, duloxetine  "nortriptyline and tizanidine.  I did not recommend any further increases in the medication.  At some point the goal would be hopefully to begin reducing the medications.  Refill sent to her pharmacy.    Literature has been provided on spinal cord stimulator for consideration.  Her hemoglobin A1c has been improving steadily.    I have referred the patient to sleep medicine to address her chronic insomnia.     [place] Prescription Drug Monitoring Program report was reviewed and was appropriate     The patient will follow-up in 6 months for medication prescription refill and reevaluation. The patient was advised to contact the office should their symptoms worsen in the interim. The patient was agreeable and verbalized an understanding.        History of Present Illness:    The patient is a 54 y.o. female last seen on 06/26/2024 who presents for a follow up office visit in regards to chronic lower extremity pain secondary to diabetic peripheral neuropathy as well as low back pain secondary to lumbar spondylosis and degenerative disc disease.  The patient currently reports ongoing peripheral neuropathy of the lower extremities and also the upper extremities however she presents today to discuss a severe exacerbation of pain in the right low back and right buttock.  Earlier in the year the patient suffered a left ankle fracture and throughout this time her gait has become significantly antalgic.  She states that this has resulted in progressive worsening of the right low back and \"sciatica\" type of pain.  Patient rates the current pain in this region a 10 out of 10 describes it as a constant sharp, throbbing and shooting type of pain.  It is made worse with prolonged sitting as well as prolonged standing and walking.  She continues with severe neuropathic pain and only feels partially controlled on the current medication regimen despite taking Lyrica, Cymbalta, nortriptyline and tizanidine.  Even with the medication " management, she states that she has issues with insomnia and is quite frustrated regarding this.    I have personally reviewed and/or updated the patient's past medical history, past surgical history, family history, social history, current medications, allergies, and vital signs today.      Current Facility-Administered Medications   Medication Dose Route Frequency Provider Last Rate    lidocaine  4 mL Infiltration Once       methylPREDNISolone acetate  40 mg Intramuscular Once        Review of Systems:    Review of Systems   Musculoskeletal:  Positive for gait problem.        Muscle weakness, decreased RMO         Past Medical History:   Diagnosis Date    Asthma     Diabetes mellitus (HCC)     History of angina     Hyperlipidemia     Hypertension        Past Surgical History:   Procedure Laterality Date    ANKLE SURGERY Left 02/01/2024    BLADDER SURGERY         History reviewed. No pertinent family history.    Social History     Occupational History    Not on file   Tobacco Use    Smoking status: Former    Smokeless tobacco: Never   Vaping Use    Vaping status: Never Used   Substance and Sexual Activity    Alcohol use: Never    Drug use: Never    Sexual activity: Not on file         Current Outpatient Medications:     albuterol (PROVENTIL HFA,VENTOLIN HFA) 90 mcg/act inhaler, INHALE 1 PUFF BY MOUTH EVERY 4 HOURS AS NEEDED, Disp: , Rfl:     atorvastatin (LIPITOR) 40 mg tablet, , Disp: , Rfl:     B-D UF III MINI PEN NEEDLES 31G X 5 MM MISC, , Disp: , Rfl:     ciclopirox (PENLAC) 8 % solution, Apply topically daily at bedtime, Disp: 6.6 mL, Rfl: 5    Continuous Glucose Sensor (Dexcom G6 Sensor) MISC, DEXCOM G6 SENSOR    MIS, See Instructions, USE TO CHECK BLOOD SUGAR 4 TIMES DAILY, CHANGE SENSOR EVERY 10 DAYS, # 3 EA, 4 Refill(s), Pharmacy: [id number], 167.64, 06/20/24 13:38:00 EDT, Height/Length, cm, 103, 06/20/24 13:38:00 EDT, Weight Measured, kg, Disp: , Rfl:     DULoxetine (CYMBALTA) 60 mg delayed  "release capsule, Take 1 capsule (60 mg total) by mouth daily, Disp: 30 capsule, Rfl: 2    Farxiga 10 MG TABS, , Disp: , Rfl:     insulin aspart (NovoLOG FlexPen) 100 UNIT/ML injection pen, , Disp: , Rfl:     Lantus SoloStar 100 units/mL injection pen, , Disp: , Rfl:     lisinopril-hydrochlorothiazide (PRINZIDE,ZESTORETIC) 20-25 MG per tablet, , Disp: , Rfl:     nortriptyline (PAMELOR) 75 MG capsule, Take 1 PO HS, Disp: 30 capsule, Rfl: 5    pregabalin (LYRICA) 200 MG capsule, Take 1 capsule (200 mg total) by mouth 3 (three) times a day, Disp: 90 capsule, Rfl: 2    tiZANidine (ZANAFLEX) 4 mg tablet, Take 1 tablet (4 mg total) by mouth 3 (three) times a day as needed for muscle spasms, Disp: 90 tablet, Rfl: 2    Trulicity 0.75 MG/0.5ML SOPN, Inject under the skin once a week As directed, Disp: , Rfl:     Continuous Blood Gluc Sensor (FreeStyle Mina 14 Day Sensor) MISC, Use as directed (Patient not taking: Reported on 9/18/2024), Disp: , Rfl:     insulin lispro (HumaLOG) 100 units/mL injection pen, , Disp: , Rfl:     urea (CARMOL) 30 % cream, Apply topically 3 (three) times a day (Patient not taking: Reported on 9/18/2024), Disp: 227 g, Rfl: 1    Current Facility-Administered Medications:     lidocaine (XYLOCAINE) 1 % injection 4 mL, 4 mL, Infiltration, Once,     methylPREDNISolone acetate (DEPO-MEDROL) injection 40 mg, 40 mg, Intramuscular, Once,     No Known Allergies    Physical Exam:    /82 (BP Location: Left arm, Patient Position: Sitting, Cuff Size: Large)   Pulse 92   Temp 97.7 °F (36.5 °C)   Ht 5' 6\" (1.676 m)   Wt 134 kg (295 lb)   SpO2 98%   BMI 47.61 kg/m²     Constitutional:normal, well developed, well nourished, alert, in no distress and non-toxic and no overt pain behavior. and obese  Eyes:anicteric  HEENT:grossly intact  Neck:supple, symmetric, trachea midline and no masses   Pulmonary:even and unlabored  Cardiovascular:No edema or pitting edema present  Skin:Normal without rashes or lesions " and well hydrated  Psychiatric:Mood and affect appropriate  Neurologic:Cranial Nerves II-XII grossly intact  Musculoskeletal: Gait is antalgic, significant tenderness to palpation of the right lumbar paraspinal muscles, tender right SI joint, right + Abhilash finger sign + Patricks test +Gaenslen's test+ Posterior pelvic pain provocation.      Imaging  FL spine and pain procedure    (Results Pending)         Orders Placed This Encounter   Procedures    FL spine and pain procedure    Ambulatory Referral to Sleep Medicine

## 2024-10-07 ENCOUNTER — TELEPHONE (OUTPATIENT)
Age: 54
End: 2024-10-07

## 2024-10-07 NOTE — TELEPHONE ENCOUNTER
Patient called requesting refill for duloxetine. Patient made aware medication was refilled on 9/18 for 30 with 2 refills to [facility] pharmacy. Patient instructed to contact the pharmacy to obtain refills of medication. Patient verbalized understanding.      decreased activity level

## 2024-10-14 ENCOUNTER — HOSPITAL ENCOUNTER (OUTPATIENT)
Dept: RADIOLOGY | Facility: CLINIC | Age: 54
Discharge: HOME | End: 2024-10-14
Payer: COMMERCIAL

## 2024-10-14 VITALS
DIASTOLIC BLOOD PRESSURE: 66 MMHG | SYSTOLIC BLOOD PRESSURE: 103 MMHG | OXYGEN SATURATION: 94 % | RESPIRATION RATE: 17 BRPM | TEMPERATURE: 97.5 F | HEART RATE: 77 BPM

## 2024-10-14 DIAGNOSIS — M46.1 SACROILIITIS (HCC): ICD-10-CM

## 2024-10-14 PROCEDURE — 27096 INJECT SACROILIAC JOINT: CPT | Performed by: ANESTHESIOLOGY

## 2024-10-14 RX ORDER — METHYLPREDNISOLONE ACETATE 80 MG/ML
40 INJECTION, SUSPENSION INTRA-ARTICULAR; INTRALESIONAL; INTRAMUSCULAR; PARENTERAL; SOFT TISSUE ONCE
Status: COMPLETED | OUTPATIENT
Start: 2024-10-14 | End: 2024-10-14

## 2024-10-14 RX ORDER — ROPIVACAINE HYDROCHLORIDE 2 MG/ML
1 INJECTION, SOLUTION EPIDURAL; INFILTRATION; PERINEURAL ONCE
Status: COMPLETED | OUTPATIENT
Start: 2024-10-14 | End: 2024-10-14

## 2024-10-14 RX ADMIN — ROPIVACAINE HYDROCHLORIDE 1 ML: 2 INJECTION EPIDURAL; INFILTRATION; PERINEURAL at 15:27

## 2024-10-14 RX ADMIN — METHYLPREDNISOLONE ACETATE 40 MG: 80 INJECTION, SUSPENSION INTRA-ARTICULAR; INTRALESIONAL; INTRAMUSCULAR; SOFT TISSUE at 15:27

## 2024-10-14 RX ADMIN — IOHEXOL 0.2 ML: 300 INJECTION, SOLUTION INTRAVENOUS at 15:27

## 2024-10-14 NOTE — DISCHARGE INSTRUCTIONS

## 2024-10-14 NOTE — H&P
History of Present Illness: The patient is a 54 y.o. female who presents with complaints of low back pain.    Past Medical History:   Diagnosis Date    Asthma     Diabetes mellitus (HCC)     History of angina     Hyperlipidemia     Hypertension        Past Surgical History:   Procedure Laterality Date    ANKLE SURGERY Left 02/01/2024    BLADDER SURGERY           Current Outpatient Medications:     albuterol (PROVENTIL HFA,VENTOLIN HFA) 90 mcg/act inhaler, INHALE 1 PUFF BY MOUTH EVERY 4 HOURS AS NEEDED, Disp: , Rfl:     atorvastatin (LIPITOR) 40 mg tablet, , Disp: , Rfl:     B-D UF III MINI PEN NEEDLES 31G X 5 MM MISC, , Disp: , Rfl:     ciclopirox (PENLAC) 8 % solution, Apply topically daily at bedtime, Disp: 6.6 mL, Rfl: 5    Continuous Blood Gluc Sensor (FreeStyle Mina 14 Day Sensor) MISC, Use as directed (Patient not taking: Reported on 9/18/2024), Disp: , Rfl:     Continuous Glucose Sensor (Dexcom G6 Sensor) MISC, DEXCOM G6 SENSOR    MIS, See Instructions, USE TO CHECK BLOOD SUGAR 4 TIMES DAILY, CHANGE SENSOR EVERY 10 DAYS, # 3 EA, 4 Refill(s), Pharmacy: [id number], 167.64, 06/20/24 13:38:00 EDT, Height/Length, cm, 103, 06/20/24 13:38:00 EDT, Weight Measured, kg, Disp: , Rfl:     DULoxetine (CYMBALTA) 60 mg delayed release capsule, Take 1 capsule (60 mg total) by mouth daily, Disp: 30 capsule, Rfl: 2    Farxiga 10 MG TABS, , Disp: , Rfl:     insulin aspart (NovoLOG FlexPen) 100 UNIT/ML injection pen, , Disp: , Rfl:     insulin lispro (HumaLOG) 100 units/mL injection pen, , Disp: , Rfl:     Lantus SoloStar 100 units/mL injection pen, , Disp: , Rfl:     lisinopril-hydrochlorothiazide (PRINZIDE,ZESTORETIC) 20-25 MG per tablet, , Disp: , Rfl:     nortriptyline (PAMELOR) 75 MG capsule, Take 1 PO HS, Disp: 30 capsule, Rfl: 5    pregabalin (LYRICA) 200 MG capsule, Take 1 capsule (200 mg total) by mouth 3 (three) times a day, Disp: 90 capsule, Rfl: 2    tiZANidine (ZANAFLEX) 4 mg tablet, Take 1 tablet (4 mg  total) by mouth 3 (three) times a day as needed for muscle spasms, Disp: 90 tablet, Rfl: 2    Trulicity 0.75 MG/0.5ML SOPN, Inject under the skin once a week As directed, Disp: , Rfl:     urea (CARMOL) 30 % cream, Apply topically 3 (three) times a day (Patient not taking: Reported on 9/18/2024), Disp: 227 g, Rfl: 1    Current Facility-Administered Medications:     iohexol (OMNIPAQUE) 300 mg/mL injection 0.2 mL, 0.2 mL, Intra-articular, Once, Geovany Montesinos,     methylPREDNISolone acetate (DEPO-MEDROL) injection 40 mg, 40 mg, Intra-articular, Once, Geovany Montesinos DO    ropivacaine (NAROPIN) injection 1 mL, 1 mL, Intra-articular, Once, Geovany Montesinos,     No Known Allergies    Physical Exam:   Vitals:    10/14/24 1516   BP: 117/66   Pulse: 80   Resp: 20   Temp: 97.5 °F (36.4 °C)   SpO2: 96%     General: Awake, Alert, Oriented x 3, Mood and affect appropriate  Respiratory: Respirations even and unlabored  Cardiovascular: Peripheral pulses intact; no edema  Musculoskeletal Exam: Antalgic gait    ASA Score: III    Patient/Chart Verification  Patient ID Verified: Verbal  ID Band Applied: No  Consents Confirmed: To be obtained in the Pre-Procedure area, Procedural  Interval H&P(within 24 hr) Complete (required for Outpatients and Surgery Admit only): To be obtained in the Procedural area  Allergies Reviewed: Yes  Anticoag/NSAID held?: NA  Currently on antibiotics?: No    Assessment:   1. Sacroiliitis (HCC)        Plan: RT SIJ

## 2024-10-28 ENCOUNTER — TELEPHONE (OUTPATIENT)
Dept: PAIN MEDICINE | Facility: CLINIC | Age: 54
End: 2024-10-28

## 2024-11-04 ENCOUNTER — OFFICE VISIT (OUTPATIENT)
Dept: PAIN MEDICINE | Facility: CLINIC | Age: 54
End: 2024-11-04
Payer: COMMERCIAL

## 2024-11-04 VITALS
HEIGHT: 66 IN | DIASTOLIC BLOOD PRESSURE: 72 MMHG | HEART RATE: 72 BPM | BODY MASS INDEX: 47.61 KG/M2 | SYSTOLIC BLOOD PRESSURE: 114 MMHG | TEMPERATURE: 98.4 F

## 2024-11-04 DIAGNOSIS — M46.1 SACROILIITIS (HCC): ICD-10-CM

## 2024-11-04 DIAGNOSIS — M47.816 LUMBAR SPONDYLOSIS: ICD-10-CM

## 2024-11-04 DIAGNOSIS — M79.2 NEUROPATHIC PAIN OF FOOT: ICD-10-CM

## 2024-11-04 DIAGNOSIS — E11.42 DIABETIC POLYNEUROPATHY ASSOCIATED WITH TYPE 2 DIABETES MELLITUS (HCC): ICD-10-CM

## 2024-11-04 DIAGNOSIS — M54.16 LUMBAR RADICULOPATHY: Primary | ICD-10-CM

## 2024-11-04 PROCEDURE — 99214 OFFICE O/P EST MOD 30 MIN: CPT | Performed by: PHYSICIAN ASSISTANT

## 2024-11-04 RX ORDER — DULOXETIN HYDROCHLORIDE 30 MG/1
30 CAPSULE, DELAYED RELEASE ORAL DAILY
Qty: 30 CAPSULE | Refills: 2 | Status: SHIPPED | OUTPATIENT
Start: 2024-11-04

## 2024-11-04 NOTE — PROGRESS NOTES
Assessment:  1. Lumbar radiculopathy    2. Lumbar spondylosis    3. Sacroiliitis (HCC)    4. Neuropathic pain of foot    5. Diabetic polyneuropathy associated with type 2 diabetes mellitus (HCC)        Plan:  While the patient was in the office today, I did have a thorough conversation regarding their chronic pain syndrome, medication management, and treatment plan options.    After discussing options, I feel is medically reasonable and necessary to obtain an MRI of the lumbar spine.  Patient has not been able to report any relief with trigger point injections and most recently a sacroiliac joint injection.  Her low back pain persists with radicular symptoms in the right lower extremity and is affecting her daily living activities.    We discussed the role of a spinal cord stimulator trial.  Patient was made aware that her hemoglobin A1c should be below 7.5 but also her BMI would likely need to be lower.    We will slightly increase the duloxetine to 90 mg daily.    Continue other medications as prescribed.      The patient will follow-up in 12 weeks for medication prescription refill and reevaluation. The patient was advised to contact the office should their symptoms worsen in the interim. The patient was agreeable and verbalized an understanding.        History of Present Illness:    The patient is a 54 y.o. female last seen on 10/14/2024 who presents for a follow up office visit in regards to chronic low back pain secondary to lumbar spondylosis and chronic neuropathic pain of bilateral lower extremities secondary to diabetic polyneuropathy.  The patient currently reports severe low back pain with radiation into the buttock and right lower extremity.  She also has neuropathic pain lower extremities.  Her current pain is 10 out of 10 and is described as an intermittent sharp and shooting type of pain that is made worse with standing and walking but also affects her ability to sleep at night.  She reports only  about 30% relief with the combination of medications which includes Lyrica, Cymbalta, nortriptyline and tizanidine.  She is scheduled for a sleep medicine consult but not until next year.  Unfortunately the trigger point injections provided no benefit and that neither did the SI joint injection.    I have personally reviewed and/or updated the patient's past medical history, past surgical history, family history, social history, current medications, allergies, and vital signs today.       Review of Systems:    Review of Systems   Respiratory:  Negative for shortness of breath.    Cardiovascular:  Negative for chest pain.   Gastrointestinal:  Negative for constipation, diarrhea, nausea and vomiting.   Musculoskeletal:  Positive for gait problem and joint swelling (Joint stiffness). Negative for arthralgias and myalgias.   Skin:  Negative for rash.   Neurological:  Negative for dizziness, seizures and weakness.   All other systems reviewed and are negative.        Past Medical History:   Diagnosis Date    Asthma     Diabetes mellitus (HCC)     History of angina     Hyperlipidemia     Hypertension        Past Surgical History:   Procedure Laterality Date    ANKLE SURGERY Left 02/01/2024    BLADDER SURGERY         History reviewed. No pertinent family history.    Social History     Occupational History    Not on file   Tobacco Use    Smoking status: Former    Smokeless tobacco: Never   Vaping Use    Vaping status: Never Used   Substance and Sexual Activity    Alcohol use: Never    Drug use: Never    Sexual activity: Not on file         Current Outpatient Medications:     albuterol (PROVENTIL HFA,VENTOLIN HFA) 90 mcg/act inhaler, INHALE 1 PUFF BY MOUTH EVERY 4 HOURS AS NEEDED, Disp: , Rfl:     atorvastatin (LIPITOR) 40 mg tablet, , Disp: , Rfl:     DULoxetine (CYMBALTA) 30 mg delayed release capsule, Take 1 capsule (30 mg total) by mouth daily, Disp: 30 capsule, Rfl: 2    DULoxetine (CYMBALTA) 60 mg delayed release  "capsule, Take 1 capsule (60 mg total) by mouth daily, Disp: 30 capsule, Rfl: 2    Farxiga 10 MG TABS, , Disp: , Rfl:     insulin aspart (NovoLOG FlexPen) 100 UNIT/ML injection pen, , Disp: , Rfl:     Lantus SoloStar 100 units/mL injection pen, , Disp: , Rfl:     lisinopril-hydrochlorothiazide (PRINZIDE,ZESTORETIC) 20-25 MG per tablet, , Disp: , Rfl:     nortriptyline (PAMELOR) 75 MG capsule, Take 1 PO HS, Disp: 30 capsule, Rfl: 5    pregabalin (LYRICA) 200 MG capsule, Take 1 capsule (200 mg total) by mouth 3 (three) times a day, Disp: 90 capsule, Rfl: 2    tiZANidine (ZANAFLEX) 4 mg tablet, Take 1 tablet (4 mg total) by mouth 3 (three) times a day as needed for muscle spasms, Disp: 90 tablet, Rfl: 2    B-D UF III MINI PEN NEEDLES 31G X 5 MM MISC, , Disp: , Rfl:     ciclopirox (PENLAC) 8 % solution, Apply topically daily at bedtime, Disp: 6.6 mL, Rfl: 5    Continuous Blood Gluc Sensor (FreeStyle Mina 14 Day Sensor) MISC, Use as directed (Patient not taking: Reported on 9/18/2024), Disp: , Rfl:     Continuous Glucose Sensor (Dexcom G6 Sensor) MISC, DEXCOM G6 SENSOR    MIS, See Instructions, USE TO CHECK BLOOD SUGAR 4 TIMES DAILY, CHANGE SENSOR EVERY 10 DAYS, # 3 EA, 4 Refill(s), Pharmacy: [id number], 167.64, 06/20/24 13:38:00 EDT, Height/Length, cm, 103, 06/20/24 13:38:00 EDT, Weight Measured, kg, Disp: , Rfl:     insulin lispro (HumaLOG) 100 units/mL injection pen, , Disp: , Rfl:     Trulicity 0.75 MG/0.5ML SOPN, Inject under the skin once a week As directed (Patient not taking: Reported on 11/4/2024), Disp: , Rfl:     urea (CARMOL) 30 % cream, Apply topically 3 (three) times a day (Patient not taking: Reported on 9/18/2024), Disp: 227 g, Rfl: 1    No Known Allergies    Physical Exam:    /72 (BP Location: Left arm, Patient Position: Sitting, Cuff Size: Large)   Pulse 72   Temp 98.4 °F (36.9 °C)   Ht 5' 6\" (1.676 m)   BMI 47.61 kg/m²     Constitutional:normal, well developed, well nourished, " alert, in no distress and non-toxic and no overt pain behavior. and obese  Eyes:anicteric  HEENT:grossly intact  Neck:supple, symmetric, trachea midline and no masses   Pulmonary:even and unlabored  Cardiovascular:No edema or pitting edema present  Skin:Normal without rashes or lesions and well hydrated  Psychiatric:Mood and affect appropriate  Neurologic:Cranial Nerves II-XII grossly intact  Musculoskeletal: Gait is antalgic, limited lumbar range of motion, tender right SI joint, tender right paraspinal muscles of the lumbar spine, positive straight leg raise test right side in seated position, decreased strength of the right lower extremity.      Imaging  MRI lumbar spine wo contrast    (Results Pending)         Orders Placed This Encounter   Procedures    MRI lumbar spine wo contrast

## 2024-12-24 DIAGNOSIS — E11.42 DIABETIC POLYNEUROPATHY ASSOCIATED WITH TYPE 2 DIABETES MELLITUS (HCC): ICD-10-CM

## 2025-01-01 RX ORDER — DULOXETIN HYDROCHLORIDE 60 MG/1
60 CAPSULE, DELAYED RELEASE ORAL DAILY
Qty: 30 CAPSULE | Refills: 0 | Status: SHIPPED | OUTPATIENT
Start: 2025-01-01

## 2025-01-13 ENCOUNTER — TELEPHONE (OUTPATIENT)
Dept: PAIN MEDICINE | Facility: CLINIC | Age: 55
End: 2025-01-13

## 2025-01-13 DIAGNOSIS — M79.605 CHRONIC PAIN OF BOTH LOWER EXTREMITIES: ICD-10-CM

## 2025-01-13 DIAGNOSIS — M79.604 CHRONIC PAIN OF BOTH LOWER EXTREMITIES: ICD-10-CM

## 2025-01-13 DIAGNOSIS — G89.29 CHRONIC PAIN OF BOTH LOWER EXTREMITIES: ICD-10-CM

## 2025-01-13 DIAGNOSIS — E11.42 DIABETIC POLYNEUROPATHY ASSOCIATED WITH TYPE 2 DIABETES MELLITUS (HCC): ICD-10-CM

## 2025-01-13 RX ORDER — PREGABALIN 200 MG/1
200 CAPSULE ORAL 3 TIMES DAILY
Qty: 90 CAPSULE | Refills: 0 | Status: SHIPPED | OUTPATIENT
Start: 2025-01-13

## 2025-01-13 RX ORDER — DULOXETIN HYDROCHLORIDE 60 MG/1
60 CAPSULE, DELAYED RELEASE ORAL DAILY
Qty: 30 CAPSULE | Refills: 0 | Status: SHIPPED | OUTPATIENT
Start: 2025-01-13

## 2025-01-13 NOTE — TELEPHONE ENCOUNTER
Caller: Megan    Doctor: Tunde    Reason for call: patient is scheduled for surgery on 1/17/2025 she does not want to cancel her appt but can she change it to Virtual 1/27 at 3 pm? If not its fine she will find away to get here.     Call back#: [phone]

## 2025-01-13 NOTE — TELEPHONE ENCOUNTER
S/w pt, pt requests refill of her lyrica and her 60mg cymbalta.  Pt denies side effects from medications. Works okay for her. Last OVS 11/4/2024. Pt does not require a CB once scripts are sent as her pharmacy will notify her.

## 2025-01-13 NOTE — TELEPHONE ENCOUNTER
Reason for call:   [x] Refill   [] Prior Auth  [] Other:     Office:   [] PCP/Provider -   [x] Specialty/Provider - S&P    DULoxetine (CYMBALTA) 60 mg delayed release capsule/  Take 1 capsule by mouth once daily     pregabalin (LYRICA) 200 MG capsule /  Take 1 capsule (200 mg total) by mouth 3 (three) times a day / 90      Pharmacy: [facility] - [address]     Does the patient have enough for 3 days?   [x] Yes   [] No - Send as HP to POD

## 2025-01-13 NOTE — TELEPHONE ENCOUNTER
Yes, a virtual visit is fine.   Discussed with Dr. Kumari PCP and will obtain a non-contrast head CT as per doctor. Will start on Amlodipine 5mg

## 2025-01-13 NOTE — TELEPHONE ENCOUNTER
LVM to let Pt know that Mana approved changing ov to virtual per Pt request     Pt will call back to let us know how she would like Mana to contact her for virtual. Mana can send link via text, email or Pt can go through Piktochart?

## 2025-01-21 DIAGNOSIS — M46.1 SACROILIITIS (HCC): ICD-10-CM

## 2025-01-21 DIAGNOSIS — M47.816 LUMBAR SPONDYLOSIS: ICD-10-CM

## 2025-01-21 DIAGNOSIS — M54.16 LUMBAR RADICULOPATHY: ICD-10-CM

## 2025-01-21 RX ORDER — DULOXETIN HYDROCHLORIDE 30 MG/1
30 CAPSULE, DELAYED RELEASE ORAL DAILY
Qty: 30 CAPSULE | Refills: 0 | OUTPATIENT
Start: 2025-01-21

## 2025-01-27 ENCOUNTER — TELEMEDICINE (OUTPATIENT)
Dept: PAIN MEDICINE | Facility: CLINIC | Age: 55
End: 2025-01-27
Payer: COMMERCIAL

## 2025-01-27 ENCOUNTER — TELEPHONE (OUTPATIENT)
Age: 55
End: 2025-01-27

## 2025-01-27 DIAGNOSIS — G89.29 CHRONIC PAIN OF BOTH LOWER EXTREMITIES: ICD-10-CM

## 2025-01-27 DIAGNOSIS — M79.605 CHRONIC PAIN OF BOTH LOWER EXTREMITIES: ICD-10-CM

## 2025-01-27 DIAGNOSIS — M47.816 LUMBAR SPONDYLOSIS: ICD-10-CM

## 2025-01-27 DIAGNOSIS — M46.1 SACROILIITIS (HCC): ICD-10-CM

## 2025-01-27 DIAGNOSIS — G89.4 CHRONIC PAIN SYNDROME: ICD-10-CM

## 2025-01-27 DIAGNOSIS — E11.42 DIABETIC POLYNEUROPATHY ASSOCIATED WITH TYPE 2 DIABETES MELLITUS (HCC): Primary | ICD-10-CM

## 2025-01-27 DIAGNOSIS — M79.604 CHRONIC PAIN OF BOTH LOWER EXTREMITIES: ICD-10-CM

## 2025-01-27 PROCEDURE — 99213 OFFICE O/P EST LOW 20 MIN: CPT | Performed by: PHYSICIAN ASSISTANT

## 2025-01-27 RX ORDER — DULOXETIN HYDROCHLORIDE 60 MG/1
60 CAPSULE, DELAYED RELEASE ORAL 2 TIMES DAILY
Qty: 60 CAPSULE | Refills: 3 | Status: SHIPPED | OUTPATIENT
Start: 2025-01-27

## 2025-01-27 RX ORDER — PREGABALIN 200 MG/1
200 CAPSULE ORAL 3 TIMES DAILY
Qty: 90 CAPSULE | Refills: 3 | Status: SHIPPED | OUTPATIENT
Start: 2025-01-27

## 2025-01-27 RX ORDER — NORTRIPTYLINE HYDROCHLORIDE 75 MG/1
CAPSULE ORAL
Qty: 30 CAPSULE | Refills: 3 | Status: SHIPPED | OUTPATIENT
Start: 2025-01-27

## 2025-01-27 NOTE — PROGRESS NOTES
"Virtual Regular Visit  Name: Conchita Jalloh      : 1970      MRN: [id number]  Encounter Provider: Mana Griffiths PA-C  Encounter Date: 2025   Encounter department: [facility] [place]      Verification of patient location:  Patient is located at Home in the following state in which I hold an active license [place] :  Assessment & Plan  Diabetic polyneuropathy associated with type 2 diabetes mellitus (HCC)  While the patient was in the office today, I did have a thorough conversation regarding their chronic pain syndrome, medication management, and treatment plan options.    After discussing options, I did tell her that we are limited with what we can do with her current medication regimen.  We will try increasing the duloxetine to 60 mg twice daily.  I have provided refills on this as well as the Lyrica and nortriptyline.    Patient will follow-up in 3 months or sooner if needed if the pain changes or worsens.    No results found for: \"HGBA1C\"    Orders:  •  DULoxetine (CYMBALTA) 60 mg delayed release capsule; Take 1 capsule (60 mg total) by mouth 2 (two) times a day  •  nortriptyline (PAMELOR) 75 MG capsule; Take 1 PO HS  •  pregabalin (LYRICA) 200 MG capsule; Take 1 capsule (200 mg total) by mouth 3 (three) times a day    Chronic pain of both lower extremities    Orders:  •  nortriptyline (PAMELOR) 75 MG capsule; Take 1 PO HS  •  pregabalin (LYRICA) 200 MG capsule; Take 1 capsule (200 mg total) by mouth 3 (three) times a day    Sacroiliitis (HCC)         Lumbar spondylosis  Consider lumbar spine MRI if her back pain increases.       Chronic pain syndrome    Orders:  •  nortriptyline (PAMELOR) 75 MG capsule; Take 1 PO HS        Encounter provider Mana Griffiths PA-C    The patient was identified by name and date of birth. Conchita Jalloh was informed that this is a telemedicine visit and that the visit is being conducted through the Epic Embedded platform. She agrees to " "proceed..  My office door was closed. No one else was in the room.  She acknowledged consent and understanding of privacy and security of the video platform. The patient has agreed to participate and understands they can discontinue the visit at any time.    Patient is aware this is a billable service.     History of Present Illness     HPI  Patient presents today for the virtual follow-up visit in regards to chronic pain.  Patient was unable to come to the office in person due to recent lower extremity surgery/extensive hardware removal done down in [facility].  Patient has chronic low back pain secondary to lumbar spondylosis and sacroiliitis in addition to bilateral lower extremity neuropathic pain secondary to diabetic neuropathy.  She states that since her last visit with us, her low back pain has eased up significantly.  She feels that either this time or the SI joint injection finally \"kicked in\".  Her neuropathic pain of her legs she feels is not well-controlled.  Patient is on Lyrica 200 mg 3 times daily, nortriptyline 75 mg nightly and duloxetine 90 mg daily.  She denies side effects of the medication.  Her current pain is rated a 7 out of 10 and described as an intermittent burning, stabbing type of pain with associated numbness paresthesias of both legs/feet.  Patient is frustrated regarding her chronic pain state.  She is not particularly interested in spinal cord stimulation.    Review of Systems    Objective   There were no vitals taken for this visit.    Physical Exam    Visit Time  Total Visit Duration: 15 minutes  "

## 2025-01-27 NOTE — TELEPHONE ENCOUNTER
S/w pharmacist. Advised ok to fill both - both are prescribed by [facility] of this office. Pharmacist verbalized understanding and appreciation.

## 2025-01-27 NOTE — ASSESSMENT & PLAN NOTE
"While the patient was in the office today, I did have a thorough conversation regarding their chronic pain syndrome, medication management, and treatment plan options.    After discussing options, I did tell her that we are limited with what we can do with her current medication regimen.  We will try increasing the duloxetine to 60 mg twice daily.  I have provided refills on this as well as the Lyrica and nortriptyline.    Patient will follow-up in 3 months or sooner if needed if the pain changes or worsens.    No results found for: \"HGBA1C\"    Orders:  •  DULoxetine (CYMBALTA) 60 mg delayed release capsule; Take 1 capsule (60 mg total) by mouth 2 (two) times a day  •  nortriptyline (PAMELOR) 75 MG capsule; Take 1 PO HS  •  pregabalin (LYRICA) 200 MG capsule; Take 1 capsule (200 mg total) by mouth 3 (three) times a day  " 133

## 2025-01-27 NOTE — ASSESSMENT & PLAN NOTE
Orders:  •  nortriptyline (PAMELOR) 75 MG capsule; Take 1 PO HS  •  pregabalin (LYRICA) 200 MG capsule; Take 1 capsule (200 mg total) by mouth 3 (three) times a day

## 2025-01-27 NOTE — TELEPHONE ENCOUNTER
Caller: pharmacy    Doctor: antonio    Reason for call: [facility] wants to make sure you are ok with prescribing nortriptyline and cymbalta. Please advise.    Call back#: [phone]

## 2025-02-07 DIAGNOSIS — Z00.6 ENCOUNTER FOR EXAMINATION FOR NORMAL COMPARISON OR CONTROL IN CLINICAL RESEARCH PROGRAM: ICD-10-CM

## 2025-03-01 DIAGNOSIS — M79.18 MYOFASCIAL PAIN SYNDROME: ICD-10-CM

## 2025-03-03 DIAGNOSIS — E11.42 DIABETIC POLYNEUROPATHY ASSOCIATED WITH TYPE 2 DIABETES MELLITUS (HCC): ICD-10-CM

## 2025-03-04 RX ORDER — DULOXETIN HYDROCHLORIDE 60 MG/1
60 CAPSULE, DELAYED RELEASE ORAL DAILY
Qty: 30 CAPSULE | Refills: 0 | OUTPATIENT
Start: 2025-03-04

## 2025-03-27 DIAGNOSIS — M79.18 MYOFASCIAL PAIN SYNDROME: ICD-10-CM

## 2025-04-07 ENCOUNTER — TELEPHONE (OUTPATIENT)
Age: 55
End: 2025-04-07

## 2025-04-07 NOTE — TELEPHONE ENCOUNTER
Yakov elena/ [facility] called in stating the patient called them to request refills of the following medications       Doctor Name: Tunde       Medication Name: pregabalin (LYRICA) 200 MG capsule & tiZANidine (ZANAFLEX) 4 mg tablet       Remaining Medication: UNKNOWN       Pharmacy and Location: [facility] - [place] - [address] Preferred Callback Phone Number:  [phone]       Thank you.

## 2025-04-08 NOTE — TELEPHONE ENCOUNTER
S/w pt, questioned refills on her medications. Pt stated that she prefers to use a pre packaged roll of daily medications rather than go back and forth to the pharmacy each month. Confirmed the pt has enough medication to get to her 4/21/25 ov with MG. Advised pt, this office does not accept refill requests from pharmacies. Please discuss refills and the pharmacy you choose to use with [facility] at your upcoming ov. Pt verbalized understanding and appreciation.

## 2025-04-10 NOTE — TELEPHONE ENCOUNTER
Pharmacy called to see what was going on. I let them know the pt will talk to the dr about this at her upcoming appt.

## 2025-04-13 ENCOUNTER — HOSPITAL ENCOUNTER (INPATIENT)
Dept: HOSPITAL 99 - 4 WEST ACU | Age: 55
LOS: 11 days | Discharge: HOME HEALTH | DRG: 871 | End: 2025-04-24
Payer: MEDICARE

## 2025-04-13 VITALS — DIASTOLIC BLOOD PRESSURE: 72 MMHG | SYSTOLIC BLOOD PRESSURE: 158 MMHG

## 2025-04-13 VITALS — DIASTOLIC BLOOD PRESSURE: 106 MMHG | SYSTOLIC BLOOD PRESSURE: 179 MMHG

## 2025-04-13 VITALS — DIASTOLIC BLOOD PRESSURE: 66 MMHG | SYSTOLIC BLOOD PRESSURE: 114 MMHG

## 2025-04-13 VITALS
BODY MASS INDEX: 58 KG/M2 | BODY MASS INDEX: 59 KG/M2 | BODY MASS INDEX: 57.1 KG/M2 | BODY MASS INDEX: 56.7 KG/M2 | BODY MASS INDEX: 57.5 KG/M2 | BODY MASS INDEX: 57.7 KG/M2 | BODY MASS INDEX: 55.9 KG/M2 | BODY MASS INDEX: 57.3 KG/M2 | BODY MASS INDEX: 58.6 KG/M2

## 2025-04-13 VITALS — DIASTOLIC BLOOD PRESSURE: 60 MMHG | SYSTOLIC BLOOD PRESSURE: 100 MMHG

## 2025-04-13 VITALS — SYSTOLIC BLOOD PRESSURE: 109 MMHG | DIASTOLIC BLOOD PRESSURE: 65 MMHG

## 2025-04-13 DIAGNOSIS — L97.529: ICD-10-CM

## 2025-04-13 DIAGNOSIS — L03.116: ICD-10-CM

## 2025-04-13 DIAGNOSIS — K81.9: ICD-10-CM

## 2025-04-13 DIAGNOSIS — Z79.899: ICD-10-CM

## 2025-04-13 DIAGNOSIS — E88.819: ICD-10-CM

## 2025-04-13 DIAGNOSIS — N17.0: ICD-10-CM

## 2025-04-13 DIAGNOSIS — G25.81: ICD-10-CM

## 2025-04-13 DIAGNOSIS — A41.9: Primary | ICD-10-CM

## 2025-04-13 DIAGNOSIS — E87.20: ICD-10-CM

## 2025-04-13 DIAGNOSIS — I11.0: ICD-10-CM

## 2025-04-13 DIAGNOSIS — Z82.49: ICD-10-CM

## 2025-04-13 DIAGNOSIS — D63.8: ICD-10-CM

## 2025-04-13 DIAGNOSIS — I50.32: ICD-10-CM

## 2025-04-13 DIAGNOSIS — E87.1: ICD-10-CM

## 2025-04-13 DIAGNOSIS — Z66: ICD-10-CM

## 2025-04-13 DIAGNOSIS — E78.00: ICD-10-CM

## 2025-04-13 DIAGNOSIS — Z79.84: ICD-10-CM

## 2025-04-13 DIAGNOSIS — J45.909: ICD-10-CM

## 2025-04-13 DIAGNOSIS — E87.5: ICD-10-CM

## 2025-04-13 DIAGNOSIS — E11.621: ICD-10-CM

## 2025-04-13 DIAGNOSIS — Z79.4: ICD-10-CM

## 2025-04-13 DIAGNOSIS — Z83.3: ICD-10-CM

## 2025-04-13 DIAGNOSIS — E11.42: ICD-10-CM

## 2025-04-13 DIAGNOSIS — E66.01: ICD-10-CM

## 2025-04-13 DIAGNOSIS — Z86.73: ICD-10-CM

## 2025-04-13 LAB
ALBUMIN SERPL-MCNC: 3.9 G/DL (ref 3.5–5)
ALP SERPL-CCNC: 132 U/L (ref 38–126)
ALT SERPL-CCNC: 25 U/L (ref 0–35)
AST SERPL-CCNC: 28 U/L (ref 14–36)
BUN SERPL-MCNC: 21 MG/DL (ref 7–17)
CALCIUM SERPL-MCNC: 9.1 MG/DL (ref 8.4–10.2)
CHLORIDE SERPL-SCNC: 94 MMOL/L (ref 98–107)
CO2 SERPL-SCNC: 31 MMOL/L (ref 22–30)
EGFR: > 60
ESTIMATED CREATININE CLEARANCE: 102 ML/MIN
GLUCOSE - POINT OF CARE: 213 MG/DL (ref 70–99)
GLUCOSE - POINT OF CARE: 219 MG/DL (ref 70–99)
GLUCOSE - POINT OF CARE: 228 MG/DL (ref 70–99)
GLUCOSE SERPL-MCNC: 288 MG/DL (ref 70–99)
HCT VFR BLD AUTO: 37.6 % (ref 37–47)
HGB BLD-MCNC: 11.8 G/DL (ref 12–16)
MCHC RBC AUTO-ENTMCNC: 31.4 G/DL (ref 33–37)
MCV RBC AUTO: 82.8 FL (ref 81–99)
NRBC BLD AUTO-RTO: 0 %
PLATELET # BLD AUTO: 282 10^3/UL (ref 130–400)
POTASSIUM SERPL-SCNC: 4.3 MMOL/L (ref 3.5–5.1)
PROT SERPL-MCNC: 7.1 G/DL (ref 6.3–8.2)
SODIUM SERPL-SCNC: 135 MMOL/L (ref 135–145)

## 2025-04-13 PROCEDURE — A9537 TC99M MEBROFENIN: HCPCS

## 2025-04-13 RX ADMIN — HYDROMORPHONE HYDROCHLORIDE 1 MG: 1 INJECTION, SOLUTION INTRAMUSCULAR; INTRAVENOUS; SUBCUTANEOUS at 18:13

## 2025-04-13 RX ADMIN — TAZOBACTAM SODIUM AND PIPERACILLIN SODIUM 100: 500; 4 INJECTION, SOLUTION INTRAVENOUS at 15:31

## 2025-04-13 RX ADMIN — SODIUM CHLORIDE 1000: 900 INJECTION, SOLUTION INTRAVENOUS at 15:31

## 2025-04-13 RX ADMIN — ONDANSETRON HYDROCHLORIDE 4 MG: 2 SOLUTION INTRAMUSCULAR; INTRAVENOUS at 14:27

## 2025-04-13 RX ADMIN — PREGABALIN 200 MG: 100 CAPSULE ORAL at 22:10

## 2025-04-13 RX ADMIN — VANCOMYCIN HYDROCHLORIDE 540 MG: 1 INJECTION, POWDER, LYOPHILIZED, FOR SOLUTION INTRAVENOUS at 16:06

## 2025-04-13 RX ADMIN — TAZOBACTAM SODIUM AND PIPERACILLIN SODIUM 50: 375; 3 INJECTION, SOLUTION INTRAVENOUS at 21:07

## 2025-04-13 RX ADMIN — ENOXAPARIN SODIUM 40 MG: 40 INJECTION SUBCUTANEOUS at 18:15

## 2025-04-13 RX ADMIN — HYDROMORPHONE HYDROCHLORIDE 1 MG: 1 INJECTION, SOLUTION INTRAMUSCULAR; INTRAVENOUS; SUBCUTANEOUS at 14:27

## 2025-04-13 RX ADMIN — INSULIN GLARGINE 0.15 UNITS: 100 INJECTION, SOLUTION SUBCUTANEOUS at 22:52

## 2025-04-13 RX ADMIN — INSULIN ASPART 3 UNITS: 100 INJECTION, SOLUTION INTRAVENOUS; SUBCUTANEOUS at 20:53

## 2025-04-13 RX ADMIN — HYDROMORPHONE HYDROCHLORIDE 0.5 MG: 1 INJECTION, SOLUTION INTRAMUSCULAR; INTRAVENOUS; SUBCUTANEOUS at 22:11

## 2025-04-13 RX ADMIN — DULOXETINE 60 MG: 60 CAPSULE, DELAYED RELEASE ORAL at 20:56

## 2025-04-13 RX ADMIN — ATORVASTATIN CALCIUM 40 MG: 40 TABLET, FILM COATED ORAL at 18:15

## 2025-04-13 RX ADMIN — FUROSEMIDE 40 MG: 40 TABLET ORAL at 20:53

## 2025-04-13 RX ADMIN — NORTRIPTYLINE HYDROCHLORIDE 75 MG: 25 CAPSULE ORAL at 22:11

## 2025-04-13 RX ADMIN — GLIPIZIDE 10 MG: 10 TABLET ORAL at 20:53

## 2025-04-14 VITALS — DIASTOLIC BLOOD PRESSURE: 63 MMHG | SYSTOLIC BLOOD PRESSURE: 98 MMHG

## 2025-04-14 VITALS — SYSTOLIC BLOOD PRESSURE: 114 MMHG | DIASTOLIC BLOOD PRESSURE: 59 MMHG

## 2025-04-14 VITALS — DIASTOLIC BLOOD PRESSURE: 55 MMHG | SYSTOLIC BLOOD PRESSURE: 96 MMHG

## 2025-04-14 LAB
ALP SERPL-CCNC: 110 U/L (ref 38–126)
ALT SERPL-CCNC: 126 U/L (ref 0–35)
AST SERPL-CCNC: 136 U/L (ref 14–36)
BUN SERPL-MCNC: 30 MG/DL (ref 7–17)
CALCIUM SERPL-MCNC: 8.2 MG/DL (ref 8.4–10.2)
CHLORIDE SERPL-SCNC: 97 MMOL/L (ref 98–107)
CO2 SERPL-SCNC: 29 MMOL/L (ref 22–30)
CRP SERPL-MCNC: > 270 MG/L (ref 0–10)
EGFR: 34.76
ESTIMATED CREATININE CLEARANCE: 53 ML/MIN
GLUCOSE - POINT OF CARE: 109 MG/DL (ref 70–99)
GLUCOSE - POINT OF CARE: 209 MG/DL (ref 70–99)
GLUCOSE - POINT OF CARE: 212 MG/DL (ref 70–99)
GLUCOSE - POINT OF CARE: 229 MG/DL (ref 70–99)
GLUCOSE SERPL-MCNC: 229 MG/DL (ref 70–99)
HBA1C MFR BLD: 8.4 % (ref 4–5.6)
HCT VFR BLD AUTO: 30.7 % (ref 37–47)
HGB BLD-MCNC: 9.5 G/DL (ref 12–16)
MCHC RBC AUTO-ENTMCNC: 30.9 G/DL (ref 33–37)
MCV RBC AUTO: 83.7 FL (ref 81–99)
PLATELET # BLD AUTO: 248 10^3/UL (ref 130–400)
POTASSIUM SERPL-SCNC: 4.5 MMOL/L (ref 3.5–5.1)
PROT SERPL-MCNC: 5.8 G/DL (ref 6.3–8.2)
SODIUM SERPL-SCNC: 134 MMOL/L (ref 135–145)

## 2025-04-14 RX ADMIN — INSULIN ASPART 3 UNITS: 100 INJECTION, SOLUTION INTRAVENOUS; SUBCUTANEOUS at 12:46

## 2025-04-14 RX ADMIN — DULOXETINE 60 MG: 60 CAPSULE, DELAYED RELEASE ORAL at 10:39

## 2025-04-14 RX ADMIN — FUROSEMIDE 40 MG: 40 TABLET ORAL at 18:17

## 2025-04-14 RX ADMIN — ACETAMINOPHEN 650 MG: 325 TABLET ORAL at 00:19

## 2025-04-14 RX ADMIN — ATORVASTATIN CALCIUM 40 MG: 40 TABLET, FILM COATED ORAL at 18:18

## 2025-04-14 RX ADMIN — MUPIROCIN 1 APPLIC: 20 OINTMENT TOPICAL at 23:33

## 2025-04-14 RX ADMIN — GLIPIZIDE 10 MG: 10 TABLET ORAL at 11:21

## 2025-04-14 RX ADMIN — TAZOBACTAM SODIUM AND PIPERACILLIN SODIUM 50: 375; 3 INJECTION, SOLUTION INTRAVENOUS at 02:39

## 2025-04-14 RX ADMIN — INSULIN ASPART: 100 INJECTION, SOLUTION INTRAVENOUS; SUBCUTANEOUS at 17:42

## 2025-04-14 RX ADMIN — INSULIN GLARGINE: 100 INJECTION, SOLUTION SUBCUTANEOUS at 21:32

## 2025-04-14 RX ADMIN — VANCOMYCIN HYDROCHLORIDE 275 MG: 1 INJECTION, POWDER, LYOPHILIZED, FOR SOLUTION INTRAVENOUS at 05:36

## 2025-04-14 RX ADMIN — PIOGLITAZONE 30 MG: 30 TABLET ORAL at 10:39

## 2025-04-14 RX ADMIN — TAZOBACTAM SODIUM AND PIPERACILLIN SODIUM 50: 375; 3 INJECTION, SOLUTION INTRAVENOUS at 21:34

## 2025-04-14 RX ADMIN — PANTOPRAZOLE SODIUM 40 MG: 40 TABLET, DELAYED RELEASE ORAL at 10:40

## 2025-04-14 RX ADMIN — NORTRIPTYLINE HYDROCHLORIDE 75 MG: 25 CAPSULE ORAL at 21:32

## 2025-04-14 RX ADMIN — PREGABALIN 200 MG: 100 CAPSULE ORAL at 21:32

## 2025-04-14 RX ADMIN — INSULIN GLARGINE: 100 INJECTION, SOLUTION SUBCUTANEOUS at 10:30

## 2025-04-14 RX ADMIN — FAMOTIDINE 20 MG: 20 TABLET, FILM COATED ORAL at 10:39

## 2025-04-14 RX ADMIN — TAZOBACTAM SODIUM AND PIPERACILLIN SODIUM 50: 375; 3 INJECTION, SOLUTION INTRAVENOUS at 16:33

## 2025-04-14 RX ADMIN — DULOXETINE 60 MG: 60 CAPSULE, DELAYED RELEASE ORAL at 21:32

## 2025-04-14 RX ADMIN — ENOXAPARIN SODIUM 40 MG: 40 INJECTION SUBCUTANEOUS at 18:18

## 2025-04-14 RX ADMIN — INSULIN GLARGINE 0.18 UNITS: 100 INJECTION, SOLUTION SUBCUTANEOUS at 22:01

## 2025-04-14 RX ADMIN — HYDROMORPHONE HYDROCHLORIDE 0.5 MG: 1 INJECTION, SOLUTION INTRAMUSCULAR; INTRAVENOUS; SUBCUTANEOUS at 02:00

## 2025-04-14 RX ADMIN — INSULIN ASPART 3 UNITS: 100 INJECTION, SOLUTION INTRAVENOUS; SUBCUTANEOUS at 09:30

## 2025-04-14 RX ADMIN — FUROSEMIDE: 40 TABLET ORAL at 10:35

## 2025-04-14 RX ADMIN — TAZOBACTAM SODIUM AND PIPERACILLIN SODIUM 50: 375; 3 INJECTION, SOLUTION INTRAVENOUS at 10:34

## 2025-04-15 VITALS — SYSTOLIC BLOOD PRESSURE: 120 MMHG | DIASTOLIC BLOOD PRESSURE: 70 MMHG

## 2025-04-15 VITALS — DIASTOLIC BLOOD PRESSURE: 43 MMHG | SYSTOLIC BLOOD PRESSURE: 114 MMHG

## 2025-04-15 VITALS — DIASTOLIC BLOOD PRESSURE: 51 MMHG | SYSTOLIC BLOOD PRESSURE: 123 MMHG

## 2025-04-15 VITALS — DIASTOLIC BLOOD PRESSURE: 57 MMHG | SYSTOLIC BLOOD PRESSURE: 95 MMHG

## 2025-04-15 LAB
ALP SERPL-CCNC: 175 U/L (ref 38–126)
ALT SERPL-CCNC: 174 U/L (ref 0–35)
AST SERPL-CCNC: 132 U/L (ref 14–36)
BUN SERPL-MCNC: 53 MG/DL (ref 7–17)
CALCIUM SERPL-MCNC: 7.9 MG/DL (ref 8.4–10.2)
CHLORIDE SERPL-SCNC: 96 MMOL/L (ref 98–107)
CO2 SERPL-SCNC: 25 MMOL/L (ref 22–30)
EGFR: 12.83
ESTIMATED CREATININE CLEARANCE: 23 ML/MIN
GLUCOSE - POINT OF CARE: 145 MG/DL (ref 70–99)
GLUCOSE - POINT OF CARE: 167 MG/DL (ref 70–99)
GLUCOSE - POINT OF CARE: 240 MG/DL (ref 70–99)
GLUCOSE - POINT OF CARE: 275 MG/DL (ref 70–99)
GLUCOSE SERPL-MCNC: 252 MG/DL (ref 70–99)
HCT VFR BLD AUTO: 27.3 % (ref 37–47)
HGB BLD-MCNC: 8.6 G/DL (ref 12–16)
IRON SERPL-MCNC: 40 UG/DL (ref 37–170)
MCHC RBC AUTO-ENTMCNC: 31.5 G/DL (ref 33–37)
MCV RBC AUTO: 83.5 FL (ref 81–99)
PLATELET # BLD AUTO: 258 10^3/UL (ref 130–400)
POTASSIUM SERPL-SCNC: 4.1 MMOL/L (ref 3.5–5.1)
PROT SERPL-MCNC: 5.7 G/DL (ref 6.3–8.2)
SODIUM SERPL-SCNC: 133 MMOL/L (ref 135–145)
SQUAMOUS URNS QL MICRO: (no result) /LPF
TIBC SERPL-MCNC: 280 UG/DL (ref 265–497)
URINE RED BLOOD CELL: (no result) /HPF (ref 0–2)
URINE WHITE CELL: (no result) /HPF (ref 0–5)

## 2025-04-15 RX ADMIN — TAZOBACTAM SODIUM AND PIPERACILLIN SODIUM 50: 375; 3 INJECTION, SOLUTION INTRAVENOUS at 08:35

## 2025-04-15 RX ADMIN — TAZOBACTAM SODIUM AND PIPERACILLIN SODIUM 50: 375; 3 INJECTION, SOLUTION INTRAVENOUS at 04:00

## 2025-04-15 RX ADMIN — ATORVASTATIN CALCIUM 40 MG: 40 TABLET, FILM COATED ORAL at 16:58

## 2025-04-15 RX ADMIN — CEFAZOLIN 5: 10 INJECTION, POWDER, FOR SOLUTION INTRAVENOUS at 16:58

## 2025-04-15 RX ADMIN — FAMOTIDINE 20 MG: 20 TABLET, FILM COATED ORAL at 08:37

## 2025-04-15 RX ADMIN — INSULIN ASPART 1 UNITS: 100 INJECTION, SOLUTION INTRAVENOUS; SUBCUTANEOUS at 13:06

## 2025-04-15 RX ADMIN — PREGABALIN 75 MG: 75 CAPSULE ORAL at 21:35

## 2025-04-15 RX ADMIN — MUPIROCIN 1 APPLIC: 20 OINTMENT TOPICAL at 08:47

## 2025-04-15 RX ADMIN — ONDANSETRON HYDROCHLORIDE 4 MG: 2 SOLUTION INTRAMUSCULAR; INTRAVENOUS at 19:58

## 2025-04-15 RX ADMIN — HYDROPHOR 1 APPLIC: 42 OINTMENT TOPICAL at 08:38

## 2025-04-15 RX ADMIN — SODIUM CHLORIDE 1000: 900 INJECTION, SOLUTION INTRAVENOUS at 16:57

## 2025-04-15 RX ADMIN — NORTRIPTYLINE HYDROCHLORIDE 75 MG: 25 CAPSULE ORAL at 21:37

## 2025-04-15 RX ADMIN — PANTOPRAZOLE SODIUM 40 MG: 40 TABLET, DELAYED RELEASE ORAL at 08:37

## 2025-04-15 RX ADMIN — DULOXETINE HYDROCHLORIDE 30 MG: 30 CAPSULE, DELAYED RELEASE PELLETS ORAL at 19:36

## 2025-04-15 RX ADMIN — DULOXETINE 60 MG: 60 CAPSULE, DELAYED RELEASE ORAL at 08:49

## 2025-04-15 RX ADMIN — FUROSEMIDE 40 MG: 40 TABLET ORAL at 08:38

## 2025-04-15 RX ADMIN — MUPIROCIN 1 APPLIC: 20 OINTMENT TOPICAL at 16:59

## 2025-04-15 RX ADMIN — HEPARIN SODIUM 5000 UNITS: 5000 INJECTION, SOLUTION INTRAVENOUS; SUBCUTANEOUS at 19:36

## 2025-04-15 RX ADMIN — Medication 1 APPLIC: at 19:36

## 2025-04-15 RX ADMIN — MUPIROCIN 1 APPLIC: 20 OINTMENT TOPICAL at 21:35

## 2025-04-15 RX ADMIN — INSULIN GLARGINE 0.18 UNITS: 100 INJECTION, SOLUTION SUBCUTANEOUS at 21:41

## 2025-04-15 RX ADMIN — INSULIN ASPART 1 UNITS: 100 INJECTION, SOLUTION INTRAVENOUS; SUBCUTANEOUS at 16:57

## 2025-04-15 RX ADMIN — ACETAMINOPHEN 650 MG: 325 TABLET ORAL at 00:10

## 2025-04-15 RX ADMIN — INSULIN ASPART 3 UNITS: 100 INJECTION, SOLUTION INTRAVENOUS; SUBCUTANEOUS at 08:35

## 2025-04-16 VITALS — SYSTOLIC BLOOD PRESSURE: 104 MMHG | DIASTOLIC BLOOD PRESSURE: 64 MMHG

## 2025-04-16 VITALS — DIASTOLIC BLOOD PRESSURE: 105 MMHG | SYSTOLIC BLOOD PRESSURE: 148 MMHG

## 2025-04-16 VITALS — DIASTOLIC BLOOD PRESSURE: 70 MMHG | SYSTOLIC BLOOD PRESSURE: 126 MMHG

## 2025-04-16 LAB
ALP SERPL-CCNC: 330 U/L (ref 38–126)
ALT SERPL-CCNC: 147 U/L (ref 0–35)
AST SERPL-CCNC: 90 U/L (ref 14–36)
BUN SERPL-MCNC: 65 MG/DL (ref 7–17)
CALCIUM SERPL-MCNC: 7.6 MG/DL (ref 8.4–10.2)
CHLORIDE SERPL-SCNC: 101 MMOL/L (ref 98–107)
CO2 SERPL-SCNC: 18 MMOL/L (ref 22–30)
ERYTHROCYTE [DISTWIDTH] IN BLOOD BY AUTOMATED COUNT: 15.9 % (ref 11.5–14.5)
ESTIMATED CREATININE CLEARANCE: 18 ML/MIN
GLUCOSE - POINT OF CARE: 106 MG/DL (ref 70–99)
GLUCOSE - POINT OF CARE: 184 MG/DL (ref 70–99)
GLUCOSE - POINT OF CARE: 198 MG/DL (ref 70–99)
GLUCOSE - POINT OF CARE: 237 MG/DL (ref 70–99)
GLUCOSE - POINT OF CARE: 252 MG/DL (ref 70–99)
GLUCOSE SERPL-MCNC: 103 MG/DL (ref 70–99)
HCT VFR BLD AUTO: 28.7 % (ref 37–47)
HGB BLD-MCNC: 8.9 G/DL (ref 12–16)
MCV RBC AUTO: 81.3 FL (ref 81–99)
PLATELET # BLD AUTO: 296 10^3/UL (ref 130–400)
POTASSIUM SERPL-SCNC: 5.2 MMOL/L (ref 3.5–5.1)
PROT SERPL-MCNC: 6.1 G/DL (ref 6.3–8.2)
SODIUM SERPL-SCNC: 132 MMOL/L (ref 135–145)

## 2025-04-16 RX ADMIN — HEPARIN SODIUM 5000 UNITS: 5000 INJECTION, SOLUTION INTRAVENOUS; SUBCUTANEOUS at 09:06

## 2025-04-16 RX ADMIN — HYDROPHOR 1 APPLIC: 42 OINTMENT TOPICAL at 09:03

## 2025-04-16 RX ADMIN — MUPIROCIN 1 APPLIC: 20 OINTMENT TOPICAL at 17:50

## 2025-04-16 RX ADMIN — INSULIN ASPART: 100 INJECTION, SOLUTION INTRAVENOUS; SUBCUTANEOUS at 07:28

## 2025-04-16 RX ADMIN — DULOXETINE HYDROCHLORIDE 30 MG: 30 CAPSULE, DELAYED RELEASE PELLETS ORAL at 09:02

## 2025-04-16 RX ADMIN — INSULIN ASPART 1 UNITS: 100 INJECTION, SOLUTION INTRAVENOUS; SUBCUTANEOUS at 17:48

## 2025-04-16 RX ADMIN — ATORVASTATIN CALCIUM 40 MG: 40 TABLET, FILM COATED ORAL at 17:47

## 2025-04-16 RX ADMIN — MUPIROCIN 1 APPLIC: 20 OINTMENT TOPICAL at 21:40

## 2025-04-16 RX ADMIN — SODIUM CHLORIDE 1000: 900 INJECTION, SOLUTION INTRAVENOUS at 12:40

## 2025-04-16 RX ADMIN — MUPIROCIN 1 APPLIC: 20 OINTMENT TOPICAL at 09:05

## 2025-04-16 RX ADMIN — GUAIFENESIN 600 MG: 600 TABLET, EXTENDED RELEASE ORAL at 21:23

## 2025-04-16 RX ADMIN — Medication 1 APPLIC: at 21:28

## 2025-04-16 RX ADMIN — INSULIN GLARGINE 0.18 UNITS: 100 INJECTION, SOLUTION SUBCUTANEOUS at 21:24

## 2025-04-16 RX ADMIN — SODIUM CHLORIDE 1000: 900 INJECTION, SOLUTION INTRAVENOUS at 18:11

## 2025-04-16 RX ADMIN — HEPARIN SODIUM 5000 UNITS: 5000 INJECTION, SOLUTION INTRAVENOUS; SUBCUTANEOUS at 21:23

## 2025-04-16 RX ADMIN — NEBIVOLOL 2.5 MG: 2.5 TABLET ORAL at 09:02

## 2025-04-16 RX ADMIN — HYDROMORPHONE HYDROCHLORIDE 0.5 MG: 1 INJECTION, SOLUTION INTRAMUSCULAR; INTRAVENOUS; SUBCUTANEOUS at 21:26

## 2025-04-16 RX ADMIN — PANTOPRAZOLE SODIUM 40 MG: 40 TABLET, DELAYED RELEASE ORAL at 09:03

## 2025-04-16 RX ADMIN — CEFAZOLIN 5: 10 INJECTION, POWDER, FOR SOLUTION INTRAVENOUS at 05:36

## 2025-04-16 RX ADMIN — SODIUM CHLORIDE 1000: 900 INJECTION, SOLUTION INTRAVENOUS at 01:49

## 2025-04-16 RX ADMIN — INSULIN ASPART 5 UNITS: 100 INJECTION, SOLUTION INTRAVENOUS; SUBCUTANEOUS at 13:15

## 2025-04-16 RX ADMIN — CEFAZOLIN 5: 10 INJECTION, POWDER, FOR SOLUTION INTRAVENOUS at 17:47

## 2025-04-16 RX ADMIN — DULOXETINE HYDROCHLORIDE 30 MG: 30 CAPSULE, DELAYED RELEASE PELLETS ORAL at 21:24

## 2025-04-16 RX ADMIN — Medication 1 APPLIC: at 09:05

## 2025-04-16 RX ADMIN — SODIUM CHLORIDE 1000: 900 INJECTION, SOLUTION INTRAVENOUS at 09:02

## 2025-04-16 RX ADMIN — ANTI-FUNGAL POWDER MICONAZOLE NITRATE TALC FREE 1 APPLIC: 1.42 POWDER TOPICAL at 21:28

## 2025-04-16 RX ADMIN — HYDROMORPHONE HYDROCHLORIDE 0.5 MG: 1 INJECTION, SOLUTION INTRAMUSCULAR; INTRAVENOUS; SUBCUTANEOUS at 16:08

## 2025-04-16 RX ADMIN — NORTRIPTYLINE HYDROCHLORIDE 75 MG: 25 CAPSULE ORAL at 21:23

## 2025-04-16 RX ADMIN — ANTI-FUNGAL POWDER MICONAZOLE NITRATE TALC FREE 1 APPLIC: 1.42 POWDER TOPICAL at 09:04

## 2025-04-16 RX ADMIN — PREGABALIN 75 MG: 75 CAPSULE ORAL at 21:24

## 2025-04-17 VITALS — DIASTOLIC BLOOD PRESSURE: 55 MMHG | SYSTOLIC BLOOD PRESSURE: 110 MMHG

## 2025-04-17 VITALS — SYSTOLIC BLOOD PRESSURE: 136 MMHG | DIASTOLIC BLOOD PRESSURE: 73 MMHG

## 2025-04-17 VITALS — DIASTOLIC BLOOD PRESSURE: 74 MMHG | SYSTOLIC BLOOD PRESSURE: 146 MMHG

## 2025-04-17 LAB
ALBUMIN SERPL-MCNC: 3.1 G/DL (ref 3.5–5)
ALP SERPL-CCNC: 401 U/L (ref 38–126)
ALT SERPL-CCNC: 77 U/L (ref 0–35)
ASO AB TITR SER: 800 IU/ML (ref ?–200)
AST SERPL-CCNC: 47 U/L (ref 14–36)
BUN SERPL-MCNC: 69 MG/DL (ref 7–17)
CALCIUM SERPL-MCNC: 7.4 MG/DL (ref 8.4–10.2)
CHLORIDE SERPL-SCNC: 98 MMOL/L (ref 98–107)
CO2 SERPL-SCNC: 19 MMOL/L (ref 22–30)
EGFR: 8.31
ERYTHROCYTE [DISTWIDTH] IN BLOOD BY AUTOMATED COUNT: 15.9 % (ref 11.5–14.5)
ESTIMATED CREATININE CLEARANCE: 17 ML/MIN
GLUCOSE - POINT OF CARE: 131 MG/DL (ref 70–99)
GLUCOSE - POINT OF CARE: 132 MG/DL (ref 70–99)
GLUCOSE - POINT OF CARE: 148 MG/DL (ref 70–99)
GLUCOSE - POINT OF CARE: 244 MG/DL (ref 70–99)
GLUCOSE SERPL-MCNC: 155 MG/DL (ref 70–99)
HCT VFR BLD AUTO: 28.1 % (ref 37–47)
HGB BLD-MCNC: 9.2 G/DL (ref 12–16)
MCHC RBC AUTO-ENTMCNC: 32.7 G/DL (ref 33–37)
MCV RBC AUTO: 81.9 FL (ref 81–99)
PLATELET # BLD AUTO: 294 10^3/UL (ref 130–400)
POTASSIUM SERPL-SCNC: 4.7 MMOL/L (ref 3.5–5.1)
SODIUM SERPL-SCNC: 133 MMOL/L (ref 135–145)

## 2025-04-17 RX ADMIN — PANTOPRAZOLE SODIUM 40 MG: 40 TABLET, DELAYED RELEASE ORAL at 10:55

## 2025-04-17 RX ADMIN — ACETAMINOPHEN 650 MG: 325 TABLET ORAL at 18:17

## 2025-04-17 RX ADMIN — CEFAZOLIN 5: 10 INJECTION, POWDER, FOR SOLUTION INTRAVENOUS at 17:59

## 2025-04-17 RX ADMIN — HEPARIN SODIUM 5000 UNITS: 5000 INJECTION, SOLUTION INTRAVENOUS; SUBCUTANEOUS at 10:59

## 2025-04-17 RX ADMIN — INSULIN ASPART 3 UNITS: 100 INJECTION, SOLUTION INTRAVENOUS; SUBCUTANEOUS at 14:21

## 2025-04-17 RX ADMIN — CEFAZOLIN 5: 10 INJECTION, POWDER, FOR SOLUTION INTRAVENOUS at 10:57

## 2025-04-17 RX ADMIN — CEFAZOLIN 5: 10 INJECTION, POWDER, FOR SOLUTION INTRAVENOUS at 00:50

## 2025-04-17 RX ADMIN — MUPIROCIN 1 APPLIC: 20 OINTMENT TOPICAL at 11:01

## 2025-04-17 RX ADMIN — HYDROPHOR 1 APPLIC: 42 OINTMENT TOPICAL at 11:02

## 2025-04-17 RX ADMIN — INSULIN ASPART 5 UNITS: 100 INJECTION, SOLUTION INTRAVENOUS; SUBCUTANEOUS at 14:20

## 2025-04-17 RX ADMIN — Medication 1 APPLIC: at 11:10

## 2025-04-17 RX ADMIN — MUPIROCIN 1 APPLIC: 20 OINTMENT TOPICAL at 20:46

## 2025-04-17 RX ADMIN — NEBIVOLOL 2.5 MG: 2.5 TABLET ORAL at 11:12

## 2025-04-17 RX ADMIN — Medication 1 APPLIC: at 21:19

## 2025-04-17 RX ADMIN — INSULIN GLARGINE 0.18 UNITS: 100 INJECTION, SOLUTION SUBCUTANEOUS at 20:46

## 2025-04-17 RX ADMIN — ATORVASTATIN CALCIUM 40 MG: 40 TABLET, FILM COATED ORAL at 18:02

## 2025-04-17 RX ADMIN — NORTRIPTYLINE HYDROCHLORIDE 75 MG: 25 CAPSULE ORAL at 20:46

## 2025-04-17 RX ADMIN — LIDOCAINE 1 PATCH: 4 PATCH TOPICAL at 14:32

## 2025-04-17 RX ADMIN — INSULIN ASPART 5 UNITS: 100 INJECTION, SOLUTION INTRAVENOUS; SUBCUTANEOUS at 11:22

## 2025-04-17 RX ADMIN — ANTI-FUNGAL POWDER MICONAZOLE NITRATE TALC FREE 1 APPLIC: 1.42 POWDER TOPICAL at 11:11

## 2025-04-17 RX ADMIN — GUAIFENESIN 600 MG: 600 TABLET, EXTENDED RELEASE ORAL at 10:59

## 2025-04-17 RX ADMIN — DULOXETINE HYDROCHLORIDE 30 MG: 30 CAPSULE, DELAYED RELEASE PELLETS ORAL at 20:45

## 2025-04-17 RX ADMIN — Medication 1 FLUSH: at 10:58

## 2025-04-17 RX ADMIN — INSULIN ASPART: 100 INJECTION, SOLUTION INTRAVENOUS; SUBCUTANEOUS at 18:01

## 2025-04-17 RX ADMIN — PREGABALIN 75 MG: 75 CAPSULE ORAL at 20:46

## 2025-04-17 RX ADMIN — GUAIFENESIN 600 MG: 600 TABLET, EXTENDED RELEASE ORAL at 20:46

## 2025-04-17 RX ADMIN — INSULIN ASPART: 100 INJECTION, SOLUTION INTRAVENOUS; SUBCUTANEOUS at 10:47

## 2025-04-17 RX ADMIN — HYDROMORPHONE HYDROCHLORIDE 0.5 MG: 1 INJECTION, SOLUTION INTRAMUSCULAR; INTRAVENOUS; SUBCUTANEOUS at 21:18

## 2025-04-17 RX ADMIN — DULOXETINE HYDROCHLORIDE 30 MG: 30 CAPSULE, DELAYED RELEASE PELLETS ORAL at 10:58

## 2025-04-17 RX ADMIN — INSULIN ASPART 5 UNITS: 100 INJECTION, SOLUTION INTRAVENOUS; SUBCUTANEOUS at 18:01

## 2025-04-17 RX ADMIN — ANTI-FUNGAL POWDER MICONAZOLE NITRATE TALC FREE 1 APPLIC: 1.42 POWDER TOPICAL at 21:19

## 2025-04-17 RX ADMIN — MUPIROCIN 1 APPLIC: 20 OINTMENT TOPICAL at 17:59

## 2025-04-17 RX ADMIN — Medication 1 FLUSH: at 10:50

## 2025-04-17 RX ADMIN — FAMOTIDINE 20 MG: 20 TABLET, FILM COATED ORAL at 10:55

## 2025-04-17 RX ADMIN — Medication 2 FLUSH: at 18:00

## 2025-04-18 VITALS — DIASTOLIC BLOOD PRESSURE: 63 MMHG | SYSTOLIC BLOOD PRESSURE: 138 MMHG

## 2025-04-18 VITALS — SYSTOLIC BLOOD PRESSURE: 141 MMHG | DIASTOLIC BLOOD PRESSURE: 67 MMHG

## 2025-04-18 VITALS — SYSTOLIC BLOOD PRESSURE: 138 MMHG | DIASTOLIC BLOOD PRESSURE: 69 MMHG

## 2025-04-18 LAB
ALP SERPL-CCNC: 402 U/L (ref 38–126)
ALT SERPL-CCNC: 28 U/L (ref 0–35)
AST SERPL-CCNC: 40 U/L (ref 14–36)
BUN SERPL-MCNC: 73 MG/DL (ref 7–17)
CALCIUM SERPL-MCNC: 7.9 MG/DL (ref 8.4–10.2)
CHLORIDE SERPL-SCNC: 100 MMOL/L (ref 98–107)
CO2 SERPL-SCNC: 21 MMOL/L (ref 22–30)
EGFR: 7.81
ESTIMATED CREATININE CLEARANCE: 16 ML/MIN
GGT SERPL-CCNC: 142 U/L (ref 12–43)
GLUCOSE - POINT OF CARE: 154 MG/DL (ref 70–99)
GLUCOSE - POINT OF CARE: 189 MG/DL (ref 70–99)
GLUCOSE - POINT OF CARE: 198 MG/DL (ref 70–99)
GLUCOSE - POINT OF CARE: 385 MG/DL (ref 70–99)
GLUCOSE SERPL-MCNC: 150 MG/DL (ref 70–99)
HCT VFR BLD AUTO: 31.4 % (ref 37–47)
HGB BLD-MCNC: 10.1 G/DL (ref 12–16)
MAGNESIUM SERPL-MCNC: 2.4 MG/DL (ref 1.6–2.3)
MCHC RBC AUTO-ENTMCNC: 32.2 G/DL (ref 33–37)
MCV RBC AUTO: 81.6 FL (ref 81–99)
PLATELET # BLD AUTO: 336 10^3/UL (ref 130–400)
PROTEINASE3 AB SER-ACNC: 0 AU/ML (ref 0–19)
SODIUM SERPL-SCNC: 135 MMOL/L (ref 135–145)

## 2025-04-18 RX ADMIN — INSULIN ASPART 1 UNITS: 100 INJECTION, SOLUTION INTRAVENOUS; SUBCUTANEOUS at 13:46

## 2025-04-18 RX ADMIN — HYDROMORPHONE HYDROCHLORIDE 0.5 MG: 1 INJECTION, SOLUTION INTRAMUSCULAR; INTRAVENOUS; SUBCUTANEOUS at 20:41

## 2025-04-18 RX ADMIN — PREGABALIN 75 MG: 75 CAPSULE ORAL at 20:38

## 2025-04-18 RX ADMIN — ATORVASTATIN CALCIUM 40 MG: 40 TABLET, FILM COATED ORAL at 20:38

## 2025-04-18 RX ADMIN — HYDROMORPHONE HYDROCHLORIDE 0.5 MG: 1 INJECTION, SOLUTION INTRAMUSCULAR; INTRAVENOUS; SUBCUTANEOUS at 13:45

## 2025-04-18 RX ADMIN — DULOXETINE HYDROCHLORIDE 30 MG: 30 CAPSULE, DELAYED RELEASE PELLETS ORAL at 13:19

## 2025-04-18 RX ADMIN — ANTI-FUNGAL POWDER MICONAZOLE NITRATE TALC FREE 1 APPLIC: 1.42 POWDER TOPICAL at 20:39

## 2025-04-18 RX ADMIN — HYDROPHOR 1 APPLIC: 42 OINTMENT TOPICAL at 13:30

## 2025-04-18 RX ADMIN — PANTOPRAZOLE SODIUM 40 MG: 40 TABLET, DELAYED RELEASE ORAL at 13:19

## 2025-04-18 RX ADMIN — Medication 1 APPLIC: at 08:00

## 2025-04-18 RX ADMIN — INSULIN ASPART 1 UNITS: 100 INJECTION, SOLUTION INTRAVENOUS; SUBCUTANEOUS at 18:54

## 2025-04-18 RX ADMIN — GUAIFENESIN 600 MG: 600 TABLET, EXTENDED RELEASE ORAL at 13:19

## 2025-04-18 RX ADMIN — INSULIN ASPART: 100 INJECTION, SOLUTION INTRAVENOUS; SUBCUTANEOUS at 13:47

## 2025-04-18 RX ADMIN — FAMOTIDINE 20 MG: 20 TABLET, FILM COATED ORAL at 20:38

## 2025-04-18 RX ADMIN — NEBIVOLOL 2.5 MG: 2.5 TABLET ORAL at 13:31

## 2025-04-18 RX ADMIN — HEPARIN SODIUM 5000 UNITS: 5000 INJECTION, SOLUTION INTRAVENOUS; SUBCUTANEOUS at 20:38

## 2025-04-18 RX ADMIN — LOPERAMIDE HYDROCHLORIDE 2 MG: 2 CAPSULE ORAL at 00:27

## 2025-04-18 RX ADMIN — MUPIROCIN 1 APPLIC: 20 OINTMENT TOPICAL at 16:00

## 2025-04-18 RX ADMIN — DULOXETINE HYDROCHLORIDE 30 MG: 30 CAPSULE, DELAYED RELEASE PELLETS ORAL at 20:37

## 2025-04-18 RX ADMIN — MUPIROCIN 1 APPLIC: 20 OINTMENT TOPICAL at 21:00

## 2025-04-18 RX ADMIN — NORTRIPTYLINE HYDROCHLORIDE 75 MG: 25 CAPSULE ORAL at 20:37

## 2025-04-18 RX ADMIN — Medication 1 APPLIC: at 20:39

## 2025-04-18 RX ADMIN — ANTI-FUNGAL POWDER MICONAZOLE NITRATE TALC FREE 1 APPLIC: 1.42 POWDER TOPICAL at 13:31

## 2025-04-18 RX ADMIN — LOPERAMIDE HYDROCHLORIDE 2 MG: 2 CAPSULE ORAL at 20:38

## 2025-04-18 RX ADMIN — INSULIN GLARGINE 0.2 UNITS: 100 INJECTION, SOLUTION SUBCUTANEOUS at 20:38

## 2025-04-18 RX ADMIN — CEFAZOLIN 5: 10 INJECTION, POWDER, FOR SOLUTION INTRAVENOUS at 01:13

## 2025-04-18 RX ADMIN — CEFAZOLIN 5: 10 INJECTION, POWDER, FOR SOLUTION INTRAVENOUS at 18:45

## 2025-04-18 RX ADMIN — CEFAZOLIN 5: 10 INJECTION, POWDER, FOR SOLUTION INTRAVENOUS at 13:13

## 2025-04-18 RX ADMIN — LIDOCAINE 1 PATCH: 4 PATCH TOPICAL at 13:20

## 2025-04-18 RX ADMIN — ATORVASTATIN CALCIUM 40 MG: 40 TABLET, FILM COATED ORAL at 18:46

## 2025-04-18 RX ADMIN — Medication 1 FLUSH: at 13:15

## 2025-04-18 RX ADMIN — Medication 1 FLUSH: at 13:45

## 2025-04-18 RX ADMIN — INSULIN ASPART: 100 INJECTION, SOLUTION INTRAVENOUS; SUBCUTANEOUS at 13:48

## 2025-04-18 RX ADMIN — GUAIFENESIN 600 MG: 600 TABLET, EXTENDED RELEASE ORAL at 20:37

## 2025-04-18 RX ADMIN — INSULIN ASPART: 100 INJECTION, SOLUTION INTRAVENOUS; SUBCUTANEOUS at 13:21

## 2025-04-18 RX ADMIN — MUPIROCIN 1 APPLIC: 20 OINTMENT TOPICAL at 08:00

## 2025-04-19 VITALS — DIASTOLIC BLOOD PRESSURE: 76 MMHG | SYSTOLIC BLOOD PRESSURE: 139 MMHG

## 2025-04-19 VITALS — DIASTOLIC BLOOD PRESSURE: 71 MMHG | SYSTOLIC BLOOD PRESSURE: 117 MMHG

## 2025-04-19 VITALS — DIASTOLIC BLOOD PRESSURE: 78 MMHG | SYSTOLIC BLOOD PRESSURE: 154 MMHG

## 2025-04-19 LAB
ALBUMIN SERPL-MCNC: 2.7 G/DL (ref 3.5–5)
ALP SERPL-CCNC: 333 U/L (ref 38–126)
ALT SERPL-CCNC: 11 U/L (ref 0–35)
AST SERPL-CCNC: 21 U/L (ref 14–36)
BUN SERPL-MCNC: 71 MG/DL (ref 7–17)
CALCIUM SERPL-MCNC: 7.8 MG/DL (ref 8.4–10.2)
CHLORIDE SERPL-SCNC: 103 MMOL/L (ref 98–107)
CO2 SERPL-SCNC: 22 MMOL/L (ref 22–30)
EGFR: 8.14
ERYTHROCYTE [DISTWIDTH] IN BLOOD BY AUTOMATED COUNT: 16.2 % (ref 11.5–14.5)
ESTIMATED CREATININE CLEARANCE: 16 ML/MIN
GLUCOSE - POINT OF CARE: 151 MG/DL (ref 70–99)
GLUCOSE - POINT OF CARE: 172 MG/DL (ref 70–99)
GLUCOSE - POINT OF CARE: 188 MG/DL (ref 70–99)
GLUCOSE - POINT OF CARE: 254 MG/DL (ref 70–99)
GLUCOSE SERPL-MCNC: 267 MG/DL (ref 70–99)
HCT VFR BLD AUTO: 28.6 % (ref 37–47)
HGB BLD-MCNC: 9.1 G/DL (ref 12–16)
MCHC RBC AUTO-ENTMCNC: 31.8 G/DL (ref 33–37)
MCV RBC AUTO: 82.2 FL (ref 81–99)
PLATELET # BLD AUTO: 338 10^3/UL (ref 130–400)
POTASSIUM SERPL-SCNC: 5.1 MMOL/L (ref 3.5–5.1)
PROT SERPL-MCNC: 5.6 G/DL (ref 6.3–8.2)
SODIUM SERPL-SCNC: 137 MMOL/L (ref 135–145)

## 2025-04-19 RX ADMIN — CEFAZOLIN 5: 10 INJECTION, POWDER, FOR SOLUTION INTRAVENOUS at 09:42

## 2025-04-19 RX ADMIN — INSULIN ASPART 1 UNITS: 100 INJECTION, SOLUTION INTRAVENOUS; SUBCUTANEOUS at 20:20

## 2025-04-19 RX ADMIN — GUAIFENESIN 600 MG: 600 TABLET, EXTENDED RELEASE ORAL at 20:13

## 2025-04-19 RX ADMIN — HEPARIN SODIUM 5000 UNITS: 5000 INJECTION, SOLUTION INTRAVENOUS; SUBCUTANEOUS at 20:14

## 2025-04-19 RX ADMIN — DULOXETINE HYDROCHLORIDE 30 MG: 30 CAPSULE, DELAYED RELEASE PELLETS ORAL at 20:14

## 2025-04-19 RX ADMIN — INSULIN ASPART: 100 INJECTION, SOLUTION INTRAVENOUS; SUBCUTANEOUS at 12:32

## 2025-04-19 RX ADMIN — MUPIROCIN 1 APPLIC: 20 OINTMENT TOPICAL at 22:01

## 2025-04-19 RX ADMIN — ANTI-FUNGAL POWDER MICONAZOLE NITRATE TALC FREE 1 APPLIC: 1.42 POWDER TOPICAL at 20:19

## 2025-04-19 RX ADMIN — INSULIN ASPART 1 UNITS: 100 INJECTION, SOLUTION INTRAVENOUS; SUBCUTANEOUS at 12:35

## 2025-04-19 RX ADMIN — LIDOCAINE: 4 PATCH TOPICAL at 09:46

## 2025-04-19 RX ADMIN — INSULIN ASPART 5 UNITS: 100 INJECTION, SOLUTION INTRAVENOUS; SUBCUTANEOUS at 09:42

## 2025-04-19 RX ADMIN — CEFAZOLIN 5: 10 INJECTION, POWDER, FOR SOLUTION INTRAVENOUS at 00:30

## 2025-04-19 RX ADMIN — INSULIN ASPART 6 UNITS: 100 INJECTION, SOLUTION INTRAVENOUS; SUBCUTANEOUS at 09:42

## 2025-04-19 RX ADMIN — INSULIN GLARGINE 0.2 UNITS: 100 INJECTION, SOLUTION SUBCUTANEOUS at 22:02

## 2025-04-19 RX ADMIN — Medication 1 APPLIC: at 20:23

## 2025-04-19 RX ADMIN — MUPIROCIN 1 APPLIC: 20 OINTMENT TOPICAL at 17:42

## 2025-04-19 RX ADMIN — HYDROPHOR 1 APPLIC: 42 OINTMENT TOPICAL at 12:34

## 2025-04-19 RX ADMIN — NEBIVOLOL 2.5 MG: 2.5 TABLET ORAL at 09:48

## 2025-04-19 RX ADMIN — HYDROMORPHONE HYDROCHLORIDE 0.5 MG: 1 INJECTION, SOLUTION INTRAMUSCULAR; INTRAVENOUS; SUBCUTANEOUS at 20:37

## 2025-04-19 RX ADMIN — MUPIROCIN 1 APPLIC: 20 OINTMENT TOPICAL at 12:34

## 2025-04-19 RX ADMIN — ANTI-FUNGAL POWDER MICONAZOLE NITRATE TALC FREE 1 APPLIC: 1.42 POWDER TOPICAL at 09:45

## 2025-04-19 RX ADMIN — Medication 1 APPLIC: at 12:35

## 2025-04-19 RX ADMIN — NORTRIPTYLINE HYDROCHLORIDE 75 MG: 25 CAPSULE ORAL at 22:00

## 2025-04-19 RX ADMIN — INSULIN ASPART: 100 INJECTION, SOLUTION INTRAVENOUS; SUBCUTANEOUS at 20:11

## 2025-04-19 RX ADMIN — LIDOCAINE: 4 PATCH TOPICAL at 10:01

## 2025-04-19 RX ADMIN — HEPARIN SODIUM 5000 UNITS: 5000 INJECTION, SOLUTION INTRAVENOUS; SUBCUTANEOUS at 09:44

## 2025-04-19 RX ADMIN — CEFAZOLIN 5: 10 INJECTION, POWDER, FOR SOLUTION INTRAVENOUS at 17:43

## 2025-04-19 RX ADMIN — PANTOPRAZOLE SODIUM 40 MG: 40 TABLET, DELAYED RELEASE ORAL at 09:48

## 2025-04-19 RX ADMIN — DULOXETINE HYDROCHLORIDE 30 MG: 30 CAPSULE, DELAYED RELEASE PELLETS ORAL at 09:48

## 2025-04-19 RX ADMIN — PREGABALIN 75 MG: 75 CAPSULE ORAL at 22:01

## 2025-04-19 RX ADMIN — GUAIFENESIN 600 MG: 600 TABLET, EXTENDED RELEASE ORAL at 09:48

## 2025-04-20 VITALS — SYSTOLIC BLOOD PRESSURE: 143 MMHG | DIASTOLIC BLOOD PRESSURE: 71 MMHG

## 2025-04-20 VITALS — DIASTOLIC BLOOD PRESSURE: 90 MMHG | SYSTOLIC BLOOD PRESSURE: 166 MMHG

## 2025-04-20 VITALS — SYSTOLIC BLOOD PRESSURE: 155 MMHG | DIASTOLIC BLOOD PRESSURE: 70 MMHG

## 2025-04-20 LAB
BUN SERPL-MCNC: 68 MG/DL (ref 7–17)
CALCIUM SERPL-MCNC: 8.2 MG/DL (ref 8.4–10.2)
CHLORIDE SERPL-SCNC: 107 MMOL/L (ref 98–107)
CO2 SERPL-SCNC: 21 MMOL/L (ref 22–30)
EGFR: 8.88
ERYTHROCYTE [DISTWIDTH] IN BLOOD BY AUTOMATED COUNT: 16.1 % (ref 11.5–14.5)
ESTIMATED CREATININE CLEARANCE: 17 ML/MIN
GLUCOSE - POINT OF CARE: 212 MG/DL (ref 70–99)
GLUCOSE - POINT OF CARE: 248 MG/DL (ref 70–99)
GLUCOSE - POINT OF CARE: 267 MG/DL (ref 70–99)
GLUCOSE - POINT OF CARE: 382 MG/DL (ref 70–99)
GLUCOSE SERPL-MCNC: 178 MG/DL (ref 70–99)
HCT VFR BLD AUTO: 29.5 % (ref 37–47)
HGB BLD-MCNC: 9.4 G/DL (ref 12–16)
MCHC RBC AUTO-ENTMCNC: 31.9 G/DL (ref 33–37)
MCV RBC AUTO: 81.9 FL (ref 81–99)
PLATELET # BLD AUTO: 393 10^3/UL (ref 130–400)
POTASSIUM SERPL-SCNC: 5.3 MMOL/L (ref 3.5–5.1)
SODIUM SERPL-SCNC: 140 MMOL/L (ref 135–145)

## 2025-04-20 RX ADMIN — INSULIN ASPART 5 UNITS: 100 INJECTION, SOLUTION INTRAVENOUS; SUBCUTANEOUS at 15:35

## 2025-04-20 RX ADMIN — NORTRIPTYLINE HYDROCHLORIDE 75 MG: 25 CAPSULE ORAL at 21:43

## 2025-04-20 RX ADMIN — LIDOCAINE: 4 PATCH TOPICAL at 09:47

## 2025-04-20 RX ADMIN — Medication 1 APPLIC: at 09:47

## 2025-04-20 RX ADMIN — DULOXETINE HYDROCHLORIDE 30 MG: 30 CAPSULE, DELAYED RELEASE PELLETS ORAL at 09:45

## 2025-04-20 RX ADMIN — HEPARIN SODIUM 5000 UNITS: 5000 INJECTION, SOLUTION INTRAVENOUS; SUBCUTANEOUS at 09:46

## 2025-04-20 RX ADMIN — INSULIN ASPART 6 UNITS: 100 INJECTION, SOLUTION INTRAVENOUS; SUBCUTANEOUS at 17:25

## 2025-04-20 RX ADMIN — PANTOPRAZOLE SODIUM 40 MG: 40 TABLET, DELAYED RELEASE ORAL at 09:45

## 2025-04-20 RX ADMIN — HEPARIN SODIUM 5000 UNITS: 5000 INJECTION, SOLUTION INTRAVENOUS; SUBCUTANEOUS at 21:00

## 2025-04-20 RX ADMIN — INSULIN ASPART: 100 INJECTION, SOLUTION INTRAVENOUS; SUBCUTANEOUS at 15:39

## 2025-04-20 RX ADMIN — MUPIROCIN: 20 OINTMENT TOPICAL at 17:27

## 2025-04-20 RX ADMIN — INSULIN ASPART: 100 INJECTION, SOLUTION INTRAVENOUS; SUBCUTANEOUS at 11:20

## 2025-04-20 RX ADMIN — HYDROMORPHONE HYDROCHLORIDE 0.5 MG: 1 INJECTION, SOLUTION INTRAMUSCULAR; INTRAVENOUS; SUBCUTANEOUS at 17:27

## 2025-04-20 RX ADMIN — CEFAZOLIN 5: 10 INJECTION, POWDER, FOR SOLUTION INTRAVENOUS at 17:27

## 2025-04-20 RX ADMIN — CEFAZOLIN 5: 10 INJECTION, POWDER, FOR SOLUTION INTRAVENOUS at 01:32

## 2025-04-20 RX ADMIN — GUAIFENESIN 600 MG: 600 TABLET, EXTENDED RELEASE ORAL at 09:45

## 2025-04-20 RX ADMIN — HYDROPHOR 1 APPLIC: 42 OINTMENT TOPICAL at 09:47

## 2025-04-20 RX ADMIN — MUPIROCIN 1 APPLIC: 20 OINTMENT TOPICAL at 21:47

## 2025-04-20 RX ADMIN — ANTI-FUNGAL POWDER MICONAZOLE NITRATE TALC FREE 1 APPLIC: 1.42 POWDER TOPICAL at 09:46

## 2025-04-20 RX ADMIN — GUAIFENESIN 600 MG: 600 TABLET, EXTENDED RELEASE ORAL at 21:00

## 2025-04-20 RX ADMIN — ANTI-FUNGAL POWDER MICONAZOLE NITRATE TALC FREE 1 APPLIC: 1.42 POWDER TOPICAL at 21:48

## 2025-04-20 RX ADMIN — CEFAZOLIN 5: 10 INJECTION, POWDER, FOR SOLUTION INTRAVENOUS at 09:45

## 2025-04-20 RX ADMIN — PREGABALIN 75 MG: 75 CAPSULE ORAL at 21:43

## 2025-04-20 RX ADMIN — Medication 1 APPLIC: at 21:48

## 2025-04-20 RX ADMIN — ATORVASTATIN CALCIUM 40 MG: 40 TABLET, FILM COATED ORAL at 17:27

## 2025-04-20 RX ADMIN — MUPIROCIN 1 APPLIC: 20 OINTMENT TOPICAL at 09:47

## 2025-04-20 RX ADMIN — NEBIVOLOL 2.5 MG: 2.5 TABLET ORAL at 09:46

## 2025-04-20 RX ADMIN — INSULIN ASPART 9 UNITS: 100 INJECTION, SOLUTION INTRAVENOUS; SUBCUTANEOUS at 17:26

## 2025-04-20 RX ADMIN — INSULIN GLARGINE 0.2 UNITS: 100 INJECTION, SOLUTION SUBCUTANEOUS at 21:43

## 2025-04-20 RX ADMIN — DULOXETINE HYDROCHLORIDE 30 MG: 30 CAPSULE, DELAYED RELEASE PELLETS ORAL at 21:00

## 2025-04-21 VITALS — SYSTOLIC BLOOD PRESSURE: 176 MMHG | DIASTOLIC BLOOD PRESSURE: 98 MMHG

## 2025-04-21 VITALS — DIASTOLIC BLOOD PRESSURE: 100 MMHG | SYSTOLIC BLOOD PRESSURE: 181 MMHG | HEART RATE: 80 BPM | OXYGEN SATURATION: 97 %

## 2025-04-21 VITALS — DIASTOLIC BLOOD PRESSURE: 64 MMHG | SYSTOLIC BLOOD PRESSURE: 134 MMHG

## 2025-04-21 VITALS — SYSTOLIC BLOOD PRESSURE: 185 MMHG | DIASTOLIC BLOOD PRESSURE: 93 MMHG

## 2025-04-21 LAB
BUN SERPL-MCNC: 57 MG/DL (ref 7–17)
CALCIUM SERPL-MCNC: 8.7 MG/DL (ref 8.4–10.2)
CHLORIDE SERPL-SCNC: 102 MMOL/L (ref 98–107)
CO2 SERPL-SCNC: 25 MMOL/L (ref 22–30)
EGFR: 10.81
ESTIMATED CREATININE CLEARANCE: 20 ML/MIN
GLUCOSE - POINT OF CARE: 176 MG/DL (ref 70–99)
GLUCOSE - POINT OF CARE: 184 MG/DL (ref 70–99)
GLUCOSE - POINT OF CARE: 213 MG/DL (ref 70–99)
GLUCOSE - POINT OF CARE: 245 MG/DL (ref 70–99)
GLUCOSE SERPL-MCNC: 178 MG/DL (ref 70–99)
POTASSIUM SERPL-SCNC: 5.4 MMOL/L (ref 3.5–5.1)
SODIUM SERPL-SCNC: 137 MMOL/L (ref 135–145)

## 2025-04-21 RX ADMIN — HEPARIN SODIUM 5000 UNITS: 5000 INJECTION, SOLUTION INTRAVENOUS; SUBCUTANEOUS at 07:59

## 2025-04-21 RX ADMIN — NIFEDIPINE 30 MG: 30 TABLET, EXTENDED RELEASE ORAL at 13:46

## 2025-04-21 RX ADMIN — ONDANSETRON HYDROCHLORIDE 4 MG: 2 SOLUTION INTRAMUSCULAR; INTRAVENOUS at 17:17

## 2025-04-21 RX ADMIN — MUPIROCIN: 20 OINTMENT TOPICAL at 16:09

## 2025-04-21 RX ADMIN — MUPIROCIN 1 APPLIC: 20 OINTMENT TOPICAL at 07:58

## 2025-04-21 RX ADMIN — MUPIROCIN: 20 OINTMENT TOPICAL at 21:04

## 2025-04-21 RX ADMIN — INSULIN ASPART 1 UNITS: 100 INJECTION, SOLUTION INTRAVENOUS; SUBCUTANEOUS at 07:55

## 2025-04-21 RX ADMIN — Medication 1 APPLIC: at 21:03

## 2025-04-21 RX ADMIN — NEBIVOLOL 2.5 MG: 2.5 TABLET ORAL at 07:57

## 2025-04-21 RX ADMIN — NIFEDIPINE 30 MG: 30 TABLET, EXTENDED RELEASE ORAL at 21:00

## 2025-04-21 RX ADMIN — HEPARIN SODIUM 5000 UNITS: 5000 INJECTION, SOLUTION INTRAVENOUS; SUBCUTANEOUS at 21:00

## 2025-04-21 RX ADMIN — INSULIN ASPART 3 UNITS: 100 INJECTION, SOLUTION INTRAVENOUS; SUBCUTANEOUS at 11:55

## 2025-04-21 RX ADMIN — CEFAZOLIN 5: 10 INJECTION, POWDER, FOR SOLUTION INTRAVENOUS at 01:06

## 2025-04-21 RX ADMIN — ATORVASTATIN CALCIUM 40 MG: 40 TABLET, FILM COATED ORAL at 17:12

## 2025-04-21 RX ADMIN — PREGABALIN 75 MG: 75 CAPSULE ORAL at 21:00

## 2025-04-21 RX ADMIN — HYDROPHOR 1 APPLIC: 42 OINTMENT TOPICAL at 08:00

## 2025-04-21 RX ADMIN — FAMOTIDINE 20 MG: 20 TABLET, FILM COATED ORAL at 07:57

## 2025-04-21 RX ADMIN — NORTRIPTYLINE HYDROCHLORIDE 75 MG: 25 CAPSULE ORAL at 21:00

## 2025-04-21 RX ADMIN — ANTI-FUNGAL POWDER MICONAZOLE NITRATE TALC FREE 1 APPLIC: 1.42 POWDER TOPICAL at 07:59

## 2025-04-21 RX ADMIN — Medication 1 APPLIC: at 08:00

## 2025-04-21 RX ADMIN — CEFAZOLIN 5: 10 INJECTION, POWDER, FOR SOLUTION INTRAVENOUS at 09:53

## 2025-04-21 RX ADMIN — INSULIN ASPART 3 UNITS: 100 INJECTION, SOLUTION INTRAVENOUS; SUBCUTANEOUS at 17:12

## 2025-04-21 RX ADMIN — HYDRALAZINE HYDROCHLORIDE 5 MG: 20 INJECTION INTRAMUSCULAR; INTRAVENOUS at 18:33

## 2025-04-21 RX ADMIN — GUAIFENESIN 600 MG: 600 TABLET, EXTENDED RELEASE ORAL at 07:57

## 2025-04-21 RX ADMIN — DULOXETINE HYDROCHLORIDE 30 MG: 30 CAPSULE, DELAYED RELEASE PELLETS ORAL at 21:00

## 2025-04-21 RX ADMIN — LIDOCAINE: 4 PATCH TOPICAL at 08:02

## 2025-04-21 RX ADMIN — PANTOPRAZOLE SODIUM 40 MG: 40 TABLET, DELAYED RELEASE ORAL at 07:57

## 2025-04-21 RX ADMIN — GUAIFENESIN 600 MG: 600 TABLET, EXTENDED RELEASE ORAL at 21:00

## 2025-04-21 RX ADMIN — DULOXETINE HYDROCHLORIDE 30 MG: 30 CAPSULE, DELAYED RELEASE PELLETS ORAL at 07:56

## 2025-04-21 RX ADMIN — CEPHALEXIN 500 MG: 500 CAPSULE ORAL at 13:35

## 2025-04-21 RX ADMIN — INSULIN ASPART 6 UNITS: 100 INJECTION, SOLUTION INTRAVENOUS; SUBCUTANEOUS at 11:56

## 2025-04-21 RX ADMIN — CEPHALEXIN 500 MG: 500 CAPSULE ORAL at 21:00

## 2025-04-21 RX ADMIN — INSULIN ASPART 9 UNITS: 100 INJECTION, SOLUTION INTRAVENOUS; SUBCUTANEOUS at 17:12

## 2025-04-21 RX ADMIN — ANTI-FUNGAL POWDER MICONAZOLE NITRATE TALC FREE 1 APPLIC: 1.42 POWDER TOPICAL at 21:03

## 2025-04-21 RX ADMIN — INSULIN GLARGINE 0.24 UNITS: 100 INJECTION, SOLUTION SUBCUTANEOUS at 21:16

## 2025-04-21 RX ADMIN — INSULIN ASPART 6 UNITS: 100 INJECTION, SOLUTION INTRAVENOUS; SUBCUTANEOUS at 07:56

## 2025-04-21 RX ADMIN — SODIUM ZIRCONIUM CYCLOSILICATE 5 GRAM: 5 POWDER, FOR SUSPENSION ORAL at 15:23

## 2025-04-22 VITALS — SYSTOLIC BLOOD PRESSURE: 133 MMHG | DIASTOLIC BLOOD PRESSURE: 66 MMHG

## 2025-04-22 VITALS — DIASTOLIC BLOOD PRESSURE: 64 MMHG | SYSTOLIC BLOOD PRESSURE: 119 MMHG

## 2025-04-22 VITALS — DIASTOLIC BLOOD PRESSURE: 70 MMHG | SYSTOLIC BLOOD PRESSURE: 131 MMHG

## 2025-04-22 VITALS — DIASTOLIC BLOOD PRESSURE: 62 MMHG | SYSTOLIC BLOOD PRESSURE: 113 MMHG

## 2025-04-22 LAB
BUN SERPL-MCNC: 56 MG/DL (ref 7–17)
BUN SERPL-MCNC: 57 MG/DL (ref 7–17)
CALCIUM SERPL-MCNC: 8.6 MG/DL (ref 8.4–10.2)
CALCIUM SERPL-MCNC: 8.9 MG/DL (ref 8.4–10.2)
CHLORIDE SERPL-SCNC: 104 MMOL/L (ref 98–107)
CHLORIDE SERPL-SCNC: 105 MMOL/L (ref 98–107)
CO2 SERPL-SCNC: 23 MMOL/L (ref 22–30)
CO2 SERPL-SCNC: 24 MMOL/L (ref 22–30)
EGFR: 13.67
EGFR: 14.13
ERYTHROCYTE [DISTWIDTH] IN BLOOD BY AUTOMATED COUNT: 16.1 % (ref 11.5–14.5)
ESTIMATED CREATININE CLEARANCE: 25 ML/MIN
ESTIMATED CREATININE CLEARANCE: 25 ML/MIN
GLUCOSE - POINT OF CARE: 122 MG/DL (ref 70–99)
GLUCOSE - POINT OF CARE: 174 MG/DL (ref 70–99)
GLUCOSE - POINT OF CARE: 198 MG/DL (ref 70–99)
GLUCOSE - POINT OF CARE: 245 MG/DL (ref 70–99)
GLUCOSE SERPL-MCNC: 118 MG/DL (ref 70–99)
GLUCOSE SERPL-MCNC: 177 MG/DL (ref 70–99)
HCT VFR BLD AUTO: 28.6 % (ref 37–47)
HGB BLD-MCNC: 9.1 G/DL (ref 12–16)
MCHC RBC AUTO-ENTMCNC: 31.8 G/DL (ref 33–37)
MCV RBC AUTO: 81.7 FL (ref 81–99)
PLATELET # BLD AUTO: 383 10^3/UL (ref 130–400)
POTASSIUM SERPL-SCNC: 5.2 MMOL/L (ref 3.5–5.1)
POTASSIUM SERPL-SCNC: 5.7 MMOL/L (ref 3.5–5.1)
SODIUM SERPL-SCNC: 139 MMOL/L (ref 135–145)
SODIUM SERPL-SCNC: 139 MMOL/L (ref 135–145)

## 2025-04-22 RX ADMIN — CEPHALEXIN 500 MG: 500 CAPSULE ORAL at 21:40

## 2025-04-22 RX ADMIN — SODIUM ZIRCONIUM CYCLOSILICATE 10 GRAM: 10 POWDER, FOR SUSPENSION ORAL at 13:55

## 2025-04-22 RX ADMIN — PREGABALIN 75 MG: 75 CAPSULE ORAL at 21:41

## 2025-04-22 RX ADMIN — LIDOCAINE: 4 PATCH TOPICAL at 08:54

## 2025-04-22 RX ADMIN — MUPIROCIN: 20 OINTMENT TOPICAL at 15:26

## 2025-04-22 RX ADMIN — INSULIN ASPART: 100 INJECTION, SOLUTION INTRAVENOUS; SUBCUTANEOUS at 08:47

## 2025-04-22 RX ADMIN — SODIUM ZIRCONIUM CYCLOSILICATE 10 GRAM: 10 POWDER, FOR SUSPENSION ORAL at 17:28

## 2025-04-22 RX ADMIN — CEPHALEXIN 500 MG: 500 CAPSULE ORAL at 06:03

## 2025-04-22 RX ADMIN — NEBIVOLOL 2.5 MG: 2.5 TABLET ORAL at 08:49

## 2025-04-22 RX ADMIN — ATORVASTATIN CALCIUM 40 MG: 40 TABLET, FILM COATED ORAL at 17:28

## 2025-04-22 RX ADMIN — GUAIFENESIN 600 MG: 600 TABLET, EXTENDED RELEASE ORAL at 21:41

## 2025-04-22 RX ADMIN — MUPIROCIN: 20 OINTMENT TOPICAL at 21:45

## 2025-04-22 RX ADMIN — NIFEDIPINE 30 MG: 30 TABLET, EXTENDED RELEASE ORAL at 08:50

## 2025-04-22 RX ADMIN — HYDROPHOR 1 APPLIC: 42 OINTMENT TOPICAL at 08:53

## 2025-04-22 RX ADMIN — SODIUM ZIRCONIUM CYCLOSILICATE 10 GRAM: 10 POWDER, FOR SUSPENSION ORAL at 09:32

## 2025-04-22 RX ADMIN — ANTI-FUNGAL POWDER MICONAZOLE NITRATE TALC FREE 1 APPLIC: 1.42 POWDER TOPICAL at 08:52

## 2025-04-22 RX ADMIN — DULOXETINE HYDROCHLORIDE 30 MG: 30 CAPSULE, DELAYED RELEASE PELLETS ORAL at 21:41

## 2025-04-22 RX ADMIN — NORTRIPTYLINE HYDROCHLORIDE 75 MG: 25 CAPSULE ORAL at 21:41

## 2025-04-22 RX ADMIN — NIFEDIPINE 30 MG: 30 TABLET, EXTENDED RELEASE ORAL at 21:41

## 2025-04-22 RX ADMIN — Medication 1 APPLIC: at 21:44

## 2025-04-22 RX ADMIN — HEPARIN SODIUM 5000 UNITS: 5000 INJECTION, SOLUTION INTRAVENOUS; SUBCUTANEOUS at 08:51

## 2025-04-22 RX ADMIN — DULOXETINE HYDROCHLORIDE 30 MG: 30 CAPSULE, DELAYED RELEASE PELLETS ORAL at 08:49

## 2025-04-22 RX ADMIN — GUAIFENESIN 600 MG: 600 TABLET, EXTENDED RELEASE ORAL at 08:51

## 2025-04-22 RX ADMIN — ANTI-FUNGAL POWDER MICONAZOLE NITRATE TALC FREE 1 APPLIC: 1.42 POWDER TOPICAL at 21:43

## 2025-04-22 RX ADMIN — HEPARIN SODIUM 5000 UNITS: 5000 INJECTION, SOLUTION INTRAVENOUS; SUBCUTANEOUS at 21:42

## 2025-04-22 RX ADMIN — PANTOPRAZOLE SODIUM 40 MG: 40 TABLET, DELAYED RELEASE ORAL at 08:48

## 2025-04-22 RX ADMIN — Medication 1 APPLIC: at 08:53

## 2025-04-22 RX ADMIN — CEPHALEXIN 500 MG: 500 CAPSULE ORAL at 13:55

## 2025-04-22 RX ADMIN — MUPIROCIN 1 APPLIC: 20 OINTMENT TOPICAL at 08:54

## 2025-04-22 RX ADMIN — INSULIN ASPART: 100 INJECTION, SOLUTION INTRAVENOUS; SUBCUTANEOUS at 09:13

## 2025-04-23 VITALS — SYSTOLIC BLOOD PRESSURE: 176 MMHG | DIASTOLIC BLOOD PRESSURE: 76 MMHG

## 2025-04-23 VITALS — DIASTOLIC BLOOD PRESSURE: 71 MMHG | SYSTOLIC BLOOD PRESSURE: 132 MMHG | HEART RATE: 81 BPM

## 2025-04-23 VITALS — SYSTOLIC BLOOD PRESSURE: 132 MMHG | DIASTOLIC BLOOD PRESSURE: 58 MMHG

## 2025-04-23 VITALS — DIASTOLIC BLOOD PRESSURE: 77 MMHG | SYSTOLIC BLOOD PRESSURE: 153 MMHG

## 2025-04-23 VITALS — DIASTOLIC BLOOD PRESSURE: 56 MMHG | SYSTOLIC BLOOD PRESSURE: 121 MMHG

## 2025-04-23 VITALS — DIASTOLIC BLOOD PRESSURE: 73 MMHG | SYSTOLIC BLOOD PRESSURE: 142 MMHG

## 2025-04-23 VITALS — DIASTOLIC BLOOD PRESSURE: 83 MMHG | SYSTOLIC BLOOD PRESSURE: 152 MMHG

## 2025-04-23 LAB
ALBUMIN SERPL-MCNC: 2.9 G/DL (ref 3.5–5)
ALP SERPL-CCNC: 191 U/L (ref 38–126)
ALT SERPL-CCNC: < 10 U/L (ref 0–35)
AST SERPL-CCNC: 17 U/L (ref 14–36)
BUN SERPL-MCNC: 54 MG/DL (ref 7–17)
CALCIUM SERPL-MCNC: 8.8 MG/DL (ref 8.4–10.2)
CHLORIDE SERPL-SCNC: 104 MMOL/L (ref 98–107)
CO2 SERPL-SCNC: 28 MMOL/L (ref 22–30)
EGFR: 15.13
ERYTHROCYTE [DISTWIDTH] IN BLOOD BY AUTOMATED COUNT: 16.1 % (ref 11.5–14.5)
ESTIMATED CREATININE CLEARANCE: 27 ML/MIN
GLUCOSE - POINT OF CARE: 137 MG/DL (ref 70–99)
GLUCOSE - POINT OF CARE: 155 MG/DL (ref 70–99)
GLUCOSE - POINT OF CARE: 178 MG/DL (ref 70–99)
GLUCOSE - POINT OF CARE: 188 MG/DL (ref 70–99)
GLUCOSE SERPL-MCNC: 133 MG/DL (ref 70–99)
HCT VFR BLD AUTO: 29.1 % (ref 37–47)
HGB BLD-MCNC: 9.4 G/DL (ref 12–16)
MCHC RBC AUTO-ENTMCNC: 32.3 G/DL (ref 33–37)
MCV RBC AUTO: 79.9 FL (ref 81–99)
NRBC BLD AUTO-RTO: 0 %
POTASSIUM SERPL-SCNC: 5.3 MMOL/L (ref 3.5–5.1)
SODIUM SERPL-SCNC: 142 MMOL/L (ref 135–145)

## 2025-04-23 RX ADMIN — NEBIVOLOL 2.5 MG: 2.5 TABLET ORAL at 08:46

## 2025-04-23 RX ADMIN — Medication 1 APPLIC: at 19:49

## 2025-04-23 RX ADMIN — GUAIFENESIN 600 MG: 600 TABLET, EXTENDED RELEASE ORAL at 19:46

## 2025-04-23 RX ADMIN — ATORVASTATIN CALCIUM 40 MG: 40 TABLET, FILM COATED ORAL at 17:38

## 2025-04-23 RX ADMIN — SODIUM ZIRCONIUM CYCLOSILICATE 10 GRAM: 10 POWDER, FOR SUSPENSION ORAL at 05:33

## 2025-04-23 RX ADMIN — HEPARIN SODIUM 5000 UNITS: 5000 INJECTION, SOLUTION INTRAVENOUS; SUBCUTANEOUS at 08:48

## 2025-04-23 RX ADMIN — Medication 1 APPLIC: at 08:49

## 2025-04-23 RX ADMIN — SODIUM ZIRCONIUM CYCLOSILICATE 10 GRAM: 10 POWDER, FOR SUSPENSION ORAL at 17:38

## 2025-04-23 RX ADMIN — DULOXETINE HYDROCHLORIDE 30 MG: 30 CAPSULE, DELAYED RELEASE PELLETS ORAL at 19:43

## 2025-04-23 RX ADMIN — PANTOPRAZOLE SODIUM 40 MG: 40 TABLET, DELAYED RELEASE ORAL at 08:47

## 2025-04-23 RX ADMIN — CEPHALEXIN 500 MG: 500 CAPSULE ORAL at 14:07

## 2025-04-23 RX ADMIN — SODIUM ZIRCONIUM CYCLOSILICATE: 10 POWDER, FOR SUSPENSION ORAL at 14:07

## 2025-04-23 RX ADMIN — SODIUM ZIRCONIUM CYCLOSILICATE 10 GRAM: 10 POWDER, FOR SUSPENSION ORAL at 14:38

## 2025-04-23 RX ADMIN — Medication 1 FLUSH: at 19:46

## 2025-04-23 RX ADMIN — ANTI-FUNGAL POWDER MICONAZOLE NITRATE TALC FREE 1 APPLIC: 1.42 POWDER TOPICAL at 08:48

## 2025-04-23 RX ADMIN — HYDROPHOR 1 APPLIC: 42 OINTMENT TOPICAL at 08:49

## 2025-04-23 RX ADMIN — PREGABALIN 75 MG: 75 CAPSULE ORAL at 21:22

## 2025-04-23 RX ADMIN — MUPIROCIN 1 APPLIC: 20 OINTMENT TOPICAL at 08:48

## 2025-04-23 RX ADMIN — NIFEDIPINE 30 MG: 30 TABLET, EXTENDED RELEASE ORAL at 19:45

## 2025-04-23 RX ADMIN — HEPARIN SODIUM 5000 UNITS: 5000 INJECTION, SOLUTION INTRAVENOUS; SUBCUTANEOUS at 19:43

## 2025-04-23 RX ADMIN — ANTI-FUNGAL POWDER MICONAZOLE NITRATE TALC FREE 1 APPLIC: 1.42 POWDER TOPICAL at 19:48

## 2025-04-23 RX ADMIN — CEPHALEXIN 500 MG: 500 CAPSULE ORAL at 05:33

## 2025-04-23 RX ADMIN — FAMOTIDINE 20 MG: 20 TABLET, FILM COATED ORAL at 08:47

## 2025-04-23 RX ADMIN — LIDOCAINE: 4 PATCH TOPICAL at 08:50

## 2025-04-23 RX ADMIN — MUPIROCIN: 20 OINTMENT TOPICAL at 17:06

## 2025-04-23 RX ADMIN — CEPHALEXIN 500 MG: 500 CAPSULE ORAL at 21:22

## 2025-04-23 RX ADMIN — NORTRIPTYLINE HYDROCHLORIDE 75 MG: 25 CAPSULE ORAL at 21:22

## 2025-04-23 RX ADMIN — NIFEDIPINE 30 MG: 30 TABLET, EXTENDED RELEASE ORAL at 08:47

## 2025-04-23 RX ADMIN — DULOXETINE HYDROCHLORIDE 30 MG: 30 CAPSULE, DELAYED RELEASE PELLETS ORAL at 08:46

## 2025-04-23 RX ADMIN — GUAIFENESIN 600 MG: 600 TABLET, EXTENDED RELEASE ORAL at 08:50

## 2025-04-24 VITALS — DIASTOLIC BLOOD PRESSURE: 82 MMHG | SYSTOLIC BLOOD PRESSURE: 155 MMHG

## 2025-04-24 VITALS — SYSTOLIC BLOOD PRESSURE: 146 MMHG | DIASTOLIC BLOOD PRESSURE: 78 MMHG

## 2025-04-24 VITALS — DIASTOLIC BLOOD PRESSURE: 90 MMHG | SYSTOLIC BLOOD PRESSURE: 155 MMHG

## 2025-04-24 LAB
ALBUMIN SERPL-MCNC: 3.2 G/DL (ref 3.5–5)
ALP SERPL-CCNC: 200 U/L (ref 38–126)
ALT SERPL-CCNC: 12 U/L (ref 0–35)
AST SERPL-CCNC: 21 U/L (ref 14–36)
BUN SERPL-MCNC: 56 MG/DL (ref 7–17)
CALCIUM SERPL-MCNC: 8.5 MG/DL (ref 8.4–10.2)
CHLORIDE SERPL-SCNC: 107 MMOL/L (ref 98–107)
CO2 SERPL-SCNC: 22 MMOL/L (ref 22–30)
EGFR: 18.31
ERYTHROCYTE [DISTWIDTH] IN BLOOD BY AUTOMATED COUNT: 16.1 % (ref 11.5–14.5)
ESTIMATED CREATININE CLEARANCE: 32 ML/MIN
GLUCOSE - POINT OF CARE: 171 MG/DL (ref 70–99)
GLUCOSE SERPL-MCNC: 177 MG/DL (ref 70–99)
HCT VFR BLD AUTO: 29.6 % (ref 37–47)
HGB BLD-MCNC: 9.3 G/DL (ref 12–16)
MCHC RBC AUTO-ENTMCNC: 31.4 G/DL (ref 33–37)
MCV RBC AUTO: 83.1 FL (ref 81–99)
NRBC BLD AUTO-RTO: 0 %
PLATELET # BLD AUTO: 363 10^3/UL (ref 130–400)
POTASSIUM SERPL-SCNC: 5.4 MMOL/L (ref 3.5–5.1)
PROT SERPL-MCNC: 6.4 G/DL (ref 6.3–8.2)
SODIUM SERPL-SCNC: 142 MMOL/L (ref 135–145)

## 2025-04-24 RX ADMIN — PANTOPRAZOLE SODIUM 40 MG: 40 TABLET, DELAYED RELEASE ORAL at 09:25

## 2025-04-24 RX ADMIN — HYDROPHOR: 42 OINTMENT TOPICAL at 09:37

## 2025-04-24 RX ADMIN — MUPIROCIN: 20 OINTMENT TOPICAL at 00:23

## 2025-04-24 RX ADMIN — DULOXETINE HYDROCHLORIDE 30 MG: 30 CAPSULE, DELAYED RELEASE PELLETS ORAL at 09:24

## 2025-04-24 RX ADMIN — Medication: at 09:37

## 2025-04-24 RX ADMIN — ANTI-FUNGAL POWDER MICONAZOLE NITRATE TALC FREE: 1.42 POWDER TOPICAL at 09:37

## 2025-04-24 RX ADMIN — LIDOCAINE: 4 PATCH TOPICAL at 09:38

## 2025-04-24 RX ADMIN — NEBIVOLOL 2.5 MG: 2.5 TABLET ORAL at 09:24

## 2025-04-24 RX ADMIN — CEPHALEXIN 500 MG: 500 CAPSULE ORAL at 05:36

## 2025-04-24 RX ADMIN — NIFEDIPINE 30 MG: 30 TABLET, EXTENDED RELEASE ORAL at 09:24

## 2025-04-24 RX ADMIN — GUAIFENESIN 600 MG: 600 TABLET, EXTENDED RELEASE ORAL at 09:24

## 2025-04-24 RX ADMIN — MUPIROCIN: 20 OINTMENT TOPICAL at 09:37

## 2025-04-24 RX ADMIN — HEPARIN SODIUM 5000 UNITS: 5000 INJECTION, SOLUTION INTRAVENOUS; SUBCUTANEOUS at 09:25

## 2025-04-30 VITALS — SYSTOLIC BLOOD PRESSURE: 174 MMHG | DIASTOLIC BLOOD PRESSURE: 108 MMHG

## 2025-04-30 VITALS — DIASTOLIC BLOOD PRESSURE: 84 MMHG | SYSTOLIC BLOOD PRESSURE: 171 MMHG

## 2025-04-30 VITALS — BODY MASS INDEX: 55.5 KG/M2

## 2025-04-30 LAB
ALBUMIN SERPL-MCNC: 3.6 G/DL (ref 3.5–5)
ALP SERPL-CCNC: 310 U/L (ref 38–126)
ALT SERPL-CCNC: 29 U/L (ref 0–35)
AST SERPL-CCNC: 26 U/L (ref 14–36)
BUN SERPL-MCNC: 15 MG/DL (ref 7–17)
CALCIUM SERPL-MCNC: 8.4 MG/DL (ref 8.4–10.2)
CHLORIDE SERPL-SCNC: 102 MMOL/L (ref 98–107)
CO2 SERPL-SCNC: 33 MMOL/L (ref 22–30)
EGFR: > 60
ERYTHROCYTE [DISTWIDTH] IN BLOOD BY AUTOMATED COUNT: 15.9 % (ref 11.5–14.5)
ESTIMATED CREATININE CLEARANCE: 91 ML/MIN
GLUCOSE SERPL-MCNC: 263 MG/DL (ref 70–99)
HGB BLD-MCNC: 10.3 G/DL (ref 12–16)
MCHC RBC AUTO-ENTMCNC: 31.2 G/DL (ref 33–37)
MCV RBC AUTO: 82.7 FL (ref 81–99)
NRBC BLD AUTO-RTO: 0.3 %
PLATELET # BLD AUTO: 354 10^3/UL (ref 130–400)
POTASSIUM SERPL-SCNC: 3.4 MMOL/L (ref 3.5–5.1)
PROT SERPL-MCNC: 7.3 G/DL (ref 6.3–8.2)
SODIUM SERPL-SCNC: 146 MMOL/L (ref 135–145)

## 2025-04-30 RX ADMIN — PREGABALIN 200 MG: 100 CAPSULE ORAL at 23:47

## 2025-04-30 RX ADMIN — CEFEPIME 2000 MG: 2 INJECTION, POWDER, FOR SOLUTION INTRAVENOUS at 23:18

## 2025-04-30 RX ADMIN — VANCOMYCIN HYDROCHLORIDE 540 MG: 1 INJECTION, POWDER, LYOPHILIZED, FOR SOLUTION INTRAVENOUS at 23:32

## 2025-04-30 RX ADMIN — SODIUM CHLORIDE 1000: 900 INJECTION, SOLUTION INTRAVENOUS at 23:17

## 2025-04-30 RX ADMIN — POTASSIUM CHLORIDE 40 MEQ: 1500 TABLET, EXTENDED RELEASE ORAL at 23:32

## 2025-05-01 ENCOUNTER — HOSPITAL ENCOUNTER (INPATIENT)
Dept: HOSPITAL 99 - 2 NORTH | Age: 55
LOS: 18 days | Discharge: HOME HEALTH | DRG: 637 | End: 2025-05-19
Payer: MEDICARE

## 2025-05-01 VITALS — DIASTOLIC BLOOD PRESSURE: 99 MMHG | SYSTOLIC BLOOD PRESSURE: 187 MMHG

## 2025-05-01 VITALS — BODY MASS INDEX: 55.3 KG/M2

## 2025-05-01 VITALS — DIASTOLIC BLOOD PRESSURE: 90 MMHG | SYSTOLIC BLOOD PRESSURE: 160 MMHG

## 2025-05-01 VITALS — DIASTOLIC BLOOD PRESSURE: 75 MMHG | SYSTOLIC BLOOD PRESSURE: 141 MMHG

## 2025-05-01 VITALS — SYSTOLIC BLOOD PRESSURE: 152 MMHG | DIASTOLIC BLOOD PRESSURE: 78 MMHG

## 2025-05-01 VITALS — SYSTOLIC BLOOD PRESSURE: 151 MMHG | DIASTOLIC BLOOD PRESSURE: 100 MMHG

## 2025-05-01 VITALS — SYSTOLIC BLOOD PRESSURE: 110 MMHG | DIASTOLIC BLOOD PRESSURE: 98 MMHG

## 2025-05-01 VITALS — DIASTOLIC BLOOD PRESSURE: 78 MMHG | SYSTOLIC BLOOD PRESSURE: 146 MMHG

## 2025-05-01 DIAGNOSIS — I89.0: ICD-10-CM

## 2025-05-01 DIAGNOSIS — J96.12: ICD-10-CM

## 2025-05-01 DIAGNOSIS — Z66: ICD-10-CM

## 2025-05-01 DIAGNOSIS — E87.5: ICD-10-CM

## 2025-05-01 DIAGNOSIS — Z87.891: ICD-10-CM

## 2025-05-01 DIAGNOSIS — T50.1X5A: ICD-10-CM

## 2025-05-01 DIAGNOSIS — D64.9: ICD-10-CM

## 2025-05-01 DIAGNOSIS — Z82.49: ICD-10-CM

## 2025-05-01 DIAGNOSIS — F32.A: ICD-10-CM

## 2025-05-01 DIAGNOSIS — E66.2: ICD-10-CM

## 2025-05-01 DIAGNOSIS — Z83.3: ICD-10-CM

## 2025-05-01 DIAGNOSIS — M86.9: ICD-10-CM

## 2025-05-01 DIAGNOSIS — E87.0: ICD-10-CM

## 2025-05-01 DIAGNOSIS — M19.011: ICD-10-CM

## 2025-05-01 DIAGNOSIS — I50.1: ICD-10-CM

## 2025-05-01 DIAGNOSIS — E11.42: ICD-10-CM

## 2025-05-01 DIAGNOSIS — J45.909: ICD-10-CM

## 2025-05-01 DIAGNOSIS — Z79.899: ICD-10-CM

## 2025-05-01 DIAGNOSIS — E11.628: Primary | ICD-10-CM

## 2025-05-01 DIAGNOSIS — E11.69: ICD-10-CM

## 2025-05-01 DIAGNOSIS — N17.9: ICD-10-CM

## 2025-05-01 DIAGNOSIS — F41.9: ICD-10-CM

## 2025-05-01 DIAGNOSIS — G25.81: ICD-10-CM

## 2025-05-01 DIAGNOSIS — Z79.84: ICD-10-CM

## 2025-05-01 DIAGNOSIS — R93.89: ICD-10-CM

## 2025-05-01 DIAGNOSIS — L03.116: ICD-10-CM

## 2025-05-01 DIAGNOSIS — J96.01: ICD-10-CM

## 2025-05-01 DIAGNOSIS — E78.00: ICD-10-CM

## 2025-05-01 DIAGNOSIS — Z79.4: ICD-10-CM

## 2025-05-01 DIAGNOSIS — I11.0: ICD-10-CM

## 2025-05-01 DIAGNOSIS — G93.41: ICD-10-CM

## 2025-05-01 DIAGNOSIS — D72.819: ICD-10-CM

## 2025-05-01 DIAGNOSIS — M65.90: ICD-10-CM

## 2025-05-01 LAB
GLUCOSE - POINT OF CARE: 124 MG/DL (ref 70–99)
GLUCOSE - POINT OF CARE: 137 MG/DL (ref 70–99)
GLUCOSE - POINT OF CARE: 237 MG/DL (ref 70–99)

## 2025-05-01 PROCEDURE — A9575 INJ GADOTERATE MEGLUMI 0.1ML: HCPCS

## 2025-05-01 RX ADMIN — Medication: at 13:56

## 2025-05-01 RX ADMIN — ENOXAPARIN SODIUM 60 MG: 60 INJECTION SUBCUTANEOUS at 21:36

## 2025-05-01 RX ADMIN — PREGABALIN 200 MG: 100 CAPSULE ORAL at 18:07

## 2025-05-01 RX ADMIN — INSULIN ASPART: 100 INJECTION, SOLUTION INTRAVENOUS; SUBCUTANEOUS at 16:16

## 2025-05-01 RX ADMIN — LISINOPRIL 20 MG: 20 TABLET ORAL at 08:20

## 2025-05-01 RX ADMIN — WATER: 1 INJECTION INTRAMUSCULAR; INTRAVENOUS; SUBCUTANEOUS at 16:34

## 2025-05-01 RX ADMIN — VANCOMYCIN HYDROCHLORIDE 200: 1 INJECTION, SOLUTION INTRAVENOUS at 14:09

## 2025-05-01 RX ADMIN — NEBIVOLOL 5 MG: 2.5 TABLET ORAL at 08:15

## 2025-05-01 RX ADMIN — NIFEDIPINE 30 MG: 30 TABLET, EXTENDED RELEASE ORAL at 08:19

## 2025-05-01 RX ADMIN — ATORVASTATIN CALCIUM 40 MG: 40 TABLET, FILM COATED ORAL at 18:07

## 2025-05-01 RX ADMIN — NORTRIPTYLINE HYDROCHLORIDE 75 MG: 25 CAPSULE ORAL at 21:34

## 2025-05-01 RX ADMIN — WATER: 1 INJECTION INTRAMUSCULAR; INTRAVENOUS; SUBCUTANEOUS at 12:06

## 2025-05-01 RX ADMIN — VANCOMYCIN HYDROCHLORIDE 200: 1 INJECTION, SOLUTION INTRAVENOUS at 23:11

## 2025-05-01 RX ADMIN — FUROSEMIDE 40 MG: 40 TABLET ORAL at 08:20

## 2025-05-01 RX ADMIN — NIFEDIPINE 30 MG: 30 TABLET, EXTENDED RELEASE ORAL at 21:33

## 2025-05-01 RX ADMIN — DULOXETINE 60 MG: 60 CAPSULE, DELAYED RELEASE ORAL at 08:18

## 2025-05-01 RX ADMIN — DULOXETINE 60 MG: 60 CAPSULE, DELAYED RELEASE ORAL at 21:32

## 2025-05-01 RX ADMIN — Medication 1 APPLIC: at 23:27

## 2025-05-01 RX ADMIN — INSULIN ASPART: 100 INJECTION, SOLUTION INTRAVENOUS; SUBCUTANEOUS at 07:50

## 2025-05-01 RX ADMIN — PANTOPRAZOLE SODIUM 40 MG: 40 TABLET, DELAYED RELEASE ORAL at 08:24

## 2025-05-01 RX ADMIN — CEFEPIME 1000 MG: 1 INJECTION, POWDER, FOR SOLUTION INTRAMUSCULAR; INTRAVENOUS at 04:59

## 2025-05-01 RX ADMIN — FAMOTIDINE 20 MG: 20 TABLET, FILM COATED ORAL at 08:19

## 2025-05-01 RX ADMIN — PREGABALIN 200 MG: 100 CAPSULE ORAL at 21:34

## 2025-05-01 RX ADMIN — INSULIN ASPART: 100 INJECTION, SOLUTION INTRAVENOUS; SUBCUTANEOUS at 11:42

## 2025-05-01 RX ADMIN — WATER 10 ML: 1 INJECTION INTRAMUSCULAR; INTRAVENOUS; SUBCUTANEOUS at 04:58

## 2025-05-01 RX ADMIN — FUROSEMIDE 40 MG: 40 TABLET ORAL at 21:32

## 2025-05-01 RX ADMIN — PREGABALIN 200 MG: 100 CAPSULE ORAL at 08:18

## 2025-05-02 VITALS — DIASTOLIC BLOOD PRESSURE: 82 MMHG | SYSTOLIC BLOOD PRESSURE: 147 MMHG

## 2025-05-02 VITALS — DIASTOLIC BLOOD PRESSURE: 64 MMHG | SYSTOLIC BLOOD PRESSURE: 115 MMHG

## 2025-05-02 VITALS — BODY MASS INDEX: 55.6 KG/M2

## 2025-05-02 VITALS — DIASTOLIC BLOOD PRESSURE: 68 MMHG | SYSTOLIC BLOOD PRESSURE: 132 MMHG

## 2025-05-02 LAB
ALBUMIN SERPL-MCNC: 3.3 G/DL (ref 3.5–5)
ALP SERPL-CCNC: 209 U/L (ref 38–126)
ALT SERPL-CCNC: 21 U/L (ref 0–35)
AST SERPL-CCNC: 19 U/L (ref 14–36)
BUN SERPL-MCNC: 18 MG/DL (ref 7–17)
CALCIUM SERPL-MCNC: 8.3 MG/DL (ref 8.4–10.2)
CHLORIDE SERPL-SCNC: 100 MMOL/L (ref 98–107)
CO2 SERPL-SCNC: 34 MMOL/L (ref 22–30)
EGFR: 58.61
ERYTHROCYTE [DISTWIDTH] IN BLOOD BY AUTOMATED COUNT: 16.3 % (ref 11.5–14.5)
ESTIMATED CREATININE CLEARANCE: 82 ML/MIN
GLUCOSE - POINT OF CARE: 105 MG/DL (ref 70–99)
GLUCOSE - POINT OF CARE: 116 MG/DL (ref 70–99)
GLUCOSE - POINT OF CARE: 129 MG/DL (ref 70–99)
GLUCOSE - POINT OF CARE: 207 MG/DL (ref 70–99)
GLUCOSE - POINT OF CARE: 216 MG/DL (ref 70–99)
GLUCOSE - POINT OF CARE: 56 MG/DL (ref 70–99)
GLUCOSE - POINT OF CARE: 69 MG/DL (ref 70–99)
GLUCOSE - POINT OF CARE: 72 MG/DL (ref 70–99)
GLUCOSE SERPL-MCNC: 121 MG/DL (ref 70–99)
HCT VFR BLD AUTO: 33.4 % (ref 37–47)
MAGNESIUM SERPL-MCNC: 1.6 MG/DL (ref 1.6–2.3)
MCHC RBC AUTO-ENTMCNC: 29.9 G/DL (ref 33–37)
MCV RBC AUTO: 86.3 FL (ref 81–99)
NRBC BLD AUTO-RTO: 0 %
PLATELET # BLD AUTO: 333 10^3/UL (ref 130–400)
POTASSIUM SERPL-SCNC: 3.6 MMOL/L (ref 3.5–5.1)
PROT SERPL-MCNC: 6.8 G/DL (ref 6.3–8.2)
SODIUM SERPL-SCNC: 144 MMOL/L (ref 135–145)

## 2025-05-02 RX ADMIN — VANCOMYCIN HYDROCHLORIDE 275 MG: 1 INJECTION, POWDER, LYOPHILIZED, FOR SOLUTION INTRAVENOUS at 18:01

## 2025-05-02 RX ADMIN — INSULIN ASPART: 100 INJECTION, SOLUTION INTRAVENOUS; SUBCUTANEOUS at 17:44

## 2025-05-02 RX ADMIN — NORTRIPTYLINE HYDROCHLORIDE 75 MG: 25 CAPSULE ORAL at 21:31

## 2025-05-02 RX ADMIN — INSULIN ASPART: 100 INJECTION, SOLUTION INTRAVENOUS; SUBCUTANEOUS at 09:28

## 2025-05-02 RX ADMIN — ATORVASTATIN CALCIUM 40 MG: 40 TABLET, FILM COATED ORAL at 17:47

## 2025-05-02 RX ADMIN — ENOXAPARIN SODIUM 60 MG: 60 INJECTION SUBCUTANEOUS at 21:31

## 2025-05-02 RX ADMIN — FUROSEMIDE 40 MG: 40 TABLET ORAL at 21:30

## 2025-05-02 RX ADMIN — Medication 1 APPLIC: at 21:29

## 2025-05-02 RX ADMIN — DULOXETINE 60 MG: 60 CAPSULE, DELAYED RELEASE ORAL at 09:32

## 2025-05-02 RX ADMIN — NIFEDIPINE 30 MG: 30 TABLET, EXTENDED RELEASE ORAL at 21:31

## 2025-05-02 RX ADMIN — VANCOMYCIN HYDROCHLORIDE 275 MG: 1 INJECTION, POWDER, LYOPHILIZED, FOR SOLUTION INTRAVENOUS at 05:35

## 2025-05-02 RX ADMIN — PREGABALIN 200 MG: 100 CAPSULE ORAL at 17:46

## 2025-05-02 RX ADMIN — FUROSEMIDE 40 MG: 40 TABLET ORAL at 09:32

## 2025-05-02 RX ADMIN — INSULIN ASPART: 100 INJECTION, SOLUTION INTRAVENOUS; SUBCUTANEOUS at 12:33

## 2025-05-02 RX ADMIN — NEBIVOLOL 5 MG: 2.5 TABLET ORAL at 09:31

## 2025-05-02 RX ADMIN — NIFEDIPINE 30 MG: 30 TABLET, EXTENDED RELEASE ORAL at 09:32

## 2025-05-02 RX ADMIN — FAMOTIDINE 20 MG: 20 TABLET, FILM COATED ORAL at 09:32

## 2025-05-02 RX ADMIN — PREGABALIN 200 MG: 100 CAPSULE ORAL at 21:31

## 2025-05-02 RX ADMIN — ENOXAPARIN SODIUM 60 MG: 60 INJECTION SUBCUTANEOUS at 09:33

## 2025-05-02 RX ADMIN — LISINOPRIL 20 MG: 20 TABLET ORAL at 09:32

## 2025-05-02 RX ADMIN — DULOXETINE 60 MG: 60 CAPSULE, DELAYED RELEASE ORAL at 21:29

## 2025-05-02 RX ADMIN — PREGABALIN 200 MG: 100 CAPSULE ORAL at 09:33

## 2025-05-02 RX ADMIN — PANTOPRAZOLE SODIUM 40 MG: 40 TABLET, DELAYED RELEASE ORAL at 09:33

## 2025-05-02 RX ADMIN — Medication 1 APPLIC: at 09:36

## 2025-05-03 VITALS — SYSTOLIC BLOOD PRESSURE: 156 MMHG | DIASTOLIC BLOOD PRESSURE: 82 MMHG

## 2025-05-03 VITALS — SYSTOLIC BLOOD PRESSURE: 123 MMHG | DIASTOLIC BLOOD PRESSURE: 83 MMHG

## 2025-05-03 VITALS — SYSTOLIC BLOOD PRESSURE: 115 MMHG | DIASTOLIC BLOOD PRESSURE: 71 MMHG

## 2025-05-03 VITALS — BODY MASS INDEX: 56.3 KG/M2

## 2025-05-03 LAB
ALBUMIN SERPL-MCNC: 3.3 G/DL (ref 3.5–5)
ALP SERPL-CCNC: 216 U/L (ref 38–126)
ALT SERPL-CCNC: 18 U/L (ref 0–35)
AST SERPL-CCNC: 18 U/L (ref 14–36)
BUN SERPL-MCNC: 27 MG/DL (ref 7–17)
CHLORIDE SERPL-SCNC: 101 MMOL/L (ref 98–107)
CO2 SERPL-SCNC: 32 MMOL/L (ref 22–30)
EGFR: 32.46
ERYTHROCYTE [DISTWIDTH] IN BLOOD BY AUTOMATED COUNT: 16.6 % (ref 11.5–14.5)
ESTIMATED CREATININE CLEARANCE: 50 ML/MIN
GLUCOSE - POINT OF CARE: 172 MG/DL (ref 70–99)
GLUCOSE - POINT OF CARE: 178 MG/DL (ref 70–99)
GLUCOSE - POINT OF CARE: 204 MG/DL (ref 70–99)
GLUCOSE - POINT OF CARE: 237 MG/DL (ref 70–99)
GLUCOSE SERPL-MCNC: 224 MG/DL (ref 70–99)
HCT VFR BLD AUTO: 29.8 % (ref 37–47)
HGB BLD-MCNC: 9.3 G/DL (ref 12–16)
MCHC RBC AUTO-ENTMCNC: 31.2 G/DL (ref 33–37)
MCV RBC AUTO: 84.9 FL (ref 81–99)
PLATELET # BLD AUTO: 308 10^3/UL (ref 130–400)
POTASSIUM SERPL-SCNC: 3.8 MMOL/L (ref 3.5–5.1)
PROT SERPL-MCNC: 6.3 G/DL (ref 6.3–8.2)
SODIUM SERPL-SCNC: 142 MMOL/L (ref 135–145)
SQUAMOUS URNS QL MICRO: >30 /LPF
URINE RED BLOOD CELL: (no result) /HPF (ref 0–2)

## 2025-05-03 RX ADMIN — CEFAZOLIN 10: 10 INJECTION, POWDER, FOR SOLUTION INTRAVENOUS at 19:44

## 2025-05-03 RX ADMIN — NORTRIPTYLINE HYDROCHLORIDE 75 MG: 25 CAPSULE ORAL at 21:38

## 2025-05-03 RX ADMIN — Medication 1 APPLIC: at 19:44

## 2025-05-03 RX ADMIN — SODIUM CHLORIDE 1000: 900 INJECTION, SOLUTION INTRAVENOUS at 08:39

## 2025-05-03 RX ADMIN — INSULIN ASPART: 100 INJECTION, SOLUTION INTRAVENOUS; SUBCUTANEOUS at 08:09

## 2025-05-03 RX ADMIN — PREGABALIN 200 MG: 100 CAPSULE ORAL at 15:49

## 2025-05-03 RX ADMIN — INSULIN ASPART: 100 INJECTION, SOLUTION INTRAVENOUS; SUBCUTANEOUS at 17:14

## 2025-05-03 RX ADMIN — PREGABALIN 200 MG: 100 CAPSULE ORAL at 08:02

## 2025-05-03 RX ADMIN — DULOXETINE 60 MG: 60 CAPSULE, DELAYED RELEASE ORAL at 08:02

## 2025-05-03 RX ADMIN — DULOXETINE 60 MG: 60 CAPSULE, DELAYED RELEASE ORAL at 19:43

## 2025-05-03 RX ADMIN — NIFEDIPINE 30 MG: 30 TABLET, EXTENDED RELEASE ORAL at 19:43

## 2025-05-03 RX ADMIN — Medication 1 APPLIC: at 08:17

## 2025-05-03 RX ADMIN — ENOXAPARIN SODIUM 60 MG: 60 INJECTION SUBCUTANEOUS at 19:44

## 2025-05-03 RX ADMIN — LISINOPRIL 20 MG: 20 TABLET ORAL at 08:02

## 2025-05-03 RX ADMIN — PREGABALIN 200 MG: 100 CAPSULE ORAL at 21:39

## 2025-05-03 RX ADMIN — NIFEDIPINE: 30 TABLET, EXTENDED RELEASE ORAL at 08:02

## 2025-05-03 RX ADMIN — NEBIVOLOL 5 MG: 2.5 TABLET ORAL at 08:02

## 2025-05-03 RX ADMIN — FUROSEMIDE 40 MG: 40 TABLET ORAL at 08:03

## 2025-05-03 RX ADMIN — INSULIN ASPART: 100 INJECTION, SOLUTION INTRAVENOUS; SUBCUTANEOUS at 11:42

## 2025-05-03 RX ADMIN — PANTOPRAZOLE SODIUM 40 MG: 40 TABLET, DELAYED RELEASE ORAL at 08:02

## 2025-05-03 RX ADMIN — ATORVASTATIN CALCIUM 40 MG: 40 TABLET, FILM COATED ORAL at 17:14

## 2025-05-03 RX ADMIN — CEFAZOLIN 10: 10 INJECTION, POWDER, FOR SOLUTION INTRAVENOUS at 12:00

## 2025-05-03 RX ADMIN — FAMOTIDINE 20 MG: 20 TABLET, FILM COATED ORAL at 08:02

## 2025-05-03 RX ADMIN — ENOXAPARIN SODIUM 60 MG: 60 INJECTION SUBCUTANEOUS at 08:02

## 2025-05-04 VITALS — SYSTOLIC BLOOD PRESSURE: 143 MMHG | DIASTOLIC BLOOD PRESSURE: 73 MMHG

## 2025-05-04 VITALS — BODY MASS INDEX: 56.8 KG/M2

## 2025-05-04 VITALS — SYSTOLIC BLOOD PRESSURE: 122 MMHG | DIASTOLIC BLOOD PRESSURE: 54 MMHG

## 2025-05-04 VITALS — DIASTOLIC BLOOD PRESSURE: 75 MMHG | SYSTOLIC BLOOD PRESSURE: 140 MMHG

## 2025-05-04 LAB
BUN SERPL-MCNC: 39 MG/DL (ref 7–17)
CALCIUM SERPL-MCNC: 8.3 MG/DL (ref 8.4–10.2)
CHLORIDE SERPL-SCNC: 103 MMOL/L (ref 98–107)
CO2 SERPL-SCNC: 30 MMOL/L (ref 22–30)
EGFR: 15.68
ESTIMATED CREATININE CLEARANCE: 28 ML/MIN
GLUCOSE - POINT OF CARE: 128 MG/DL (ref 70–99)
GLUCOSE - POINT OF CARE: 154 MG/DL (ref 70–99)
GLUCOSE - POINT OF CARE: 168 MG/DL (ref 70–99)
GLUCOSE - POINT OF CARE: 218 MG/DL (ref 70–99)
GLUCOSE SERPL-MCNC: 152 MG/DL (ref 70–99)
POTASSIUM SERPL-SCNC: 3.9 MMOL/L (ref 3.5–5.1)
SODIUM SERPL-SCNC: 143 MMOL/L (ref 135–145)

## 2025-05-04 RX ADMIN — INSULIN ASPART: 100 INJECTION, SOLUTION INTRAVENOUS; SUBCUTANEOUS at 07:47

## 2025-05-04 RX ADMIN — CEFAZOLIN 10: 10 INJECTION, POWDER, FOR SOLUTION INTRAVENOUS at 19:50

## 2025-05-04 RX ADMIN — PANTOPRAZOLE SODIUM 40 MG: 40 TABLET, DELAYED RELEASE ORAL at 07:53

## 2025-05-04 RX ADMIN — Medication 1 APPLIC: at 22:34

## 2025-05-04 RX ADMIN — NIFEDIPINE 30 MG: 30 TABLET, EXTENDED RELEASE ORAL at 22:33

## 2025-05-04 RX ADMIN — ATORVASTATIN CALCIUM 40 MG: 40 TABLET, FILM COATED ORAL at 17:11

## 2025-05-04 RX ADMIN — ENOXAPARIN SODIUM 60 MG: 60 INJECTION SUBCUTANEOUS at 19:50

## 2025-05-04 RX ADMIN — FAMOTIDINE 20 MG: 20 TABLET, FILM COATED ORAL at 07:53

## 2025-05-04 RX ADMIN — CEFAZOLIN 10: 10 INJECTION, POWDER, FOR SOLUTION INTRAVENOUS at 03:23

## 2025-05-04 RX ADMIN — ENOXAPARIN SODIUM 60 MG: 60 INJECTION SUBCUTANEOUS at 07:54

## 2025-05-04 RX ADMIN — DULOXETINE 60 MG: 60 CAPSULE, DELAYED RELEASE ORAL at 19:50

## 2025-05-04 RX ADMIN — INSULIN ASPART: 100 INJECTION, SOLUTION INTRAVENOUS; SUBCUTANEOUS at 12:11

## 2025-05-04 RX ADMIN — INSULIN ASPART: 100 INJECTION, SOLUTION INTRAVENOUS; SUBCUTANEOUS at 16:35

## 2025-05-04 RX ADMIN — NIFEDIPINE 30 MG: 30 TABLET, EXTENDED RELEASE ORAL at 07:55

## 2025-05-04 RX ADMIN — SODIUM CHLORIDE 1000: 900 INJECTION, SOLUTION INTRAVENOUS at 03:22

## 2025-05-04 RX ADMIN — SODIUM CHLORIDE 1000: 900 INJECTION, SOLUTION INTRAVENOUS at 19:54

## 2025-05-04 RX ADMIN — PREGABALIN 200 MG: 100 CAPSULE ORAL at 07:53

## 2025-05-04 RX ADMIN — NEBIVOLOL 5 MG: 2.5 TABLET ORAL at 07:55

## 2025-05-04 RX ADMIN — PREGABALIN 200 MG: 100 CAPSULE ORAL at 16:35

## 2025-05-04 RX ADMIN — Medication 1 APPLIC: at 07:54

## 2025-05-04 RX ADMIN — NORTRIPTYLINE HYDROCHLORIDE 75 MG: 25 CAPSULE ORAL at 22:32

## 2025-05-04 RX ADMIN — DULOXETINE 60 MG: 60 CAPSULE, DELAYED RELEASE ORAL at 07:53

## 2025-05-04 RX ADMIN — CEFAZOLIN 10: 10 INJECTION, POWDER, FOR SOLUTION INTRAVENOUS at 12:25

## 2025-05-04 RX ADMIN — PREGABALIN 200 MG: 100 CAPSULE ORAL at 22:32

## 2025-05-05 VITALS — DIASTOLIC BLOOD PRESSURE: 72 MMHG | SYSTOLIC BLOOD PRESSURE: 122 MMHG

## 2025-05-05 VITALS — DIASTOLIC BLOOD PRESSURE: 72 MMHG | SYSTOLIC BLOOD PRESSURE: 131 MMHG

## 2025-05-05 VITALS — SYSTOLIC BLOOD PRESSURE: 132 MMHG | DIASTOLIC BLOOD PRESSURE: 73 MMHG

## 2025-05-05 VITALS — BODY MASS INDEX: 57.4 KG/M2

## 2025-05-05 LAB
BUN SERPL-MCNC: 44 MG/DL (ref 7–17)
CALCIUM SERPL-MCNC: 8.1 MG/DL (ref 8.4–10.2)
CHLORIDE SERPL-SCNC: 103 MMOL/L (ref 98–107)
CO2 SERPL-SCNC: 30 MMOL/L (ref 22–30)
EGFR: 12.83
ESTIMATED CREATININE CLEARANCE: 23 ML/MIN
GLUCOSE - POINT OF CARE: 175 MG/DL (ref 70–99)
GLUCOSE - POINT OF CARE: 179 MG/DL (ref 70–99)
GLUCOSE - POINT OF CARE: 206 MG/DL (ref 70–99)
GLUCOSE - POINT OF CARE: 234 MG/DL (ref 70–99)
GLUCOSE SERPL-MCNC: 184 MG/DL (ref 70–99)
POTASSIUM SERPL-SCNC: 4.3 MMOL/L (ref 3.5–5.1)
SODIUM SERPL-SCNC: 144 MMOL/L (ref 135–145)

## 2025-05-05 RX ADMIN — INSULIN ASPART: 100 INJECTION, SOLUTION INTRAVENOUS; SUBCUTANEOUS at 07:49

## 2025-05-05 RX ADMIN — FAMOTIDINE 20 MG: 20 TABLET, FILM COATED ORAL at 07:49

## 2025-05-05 RX ADMIN — NEBIVOLOL 5 MG: 2.5 TABLET ORAL at 07:49

## 2025-05-05 RX ADMIN — CEFAZOLIN 10: 10 INJECTION, POWDER, FOR SOLUTION INTRAVENOUS at 12:39

## 2025-05-05 RX ADMIN — NIFEDIPINE 30 MG: 30 TABLET, EXTENDED RELEASE ORAL at 20:24

## 2025-05-05 RX ADMIN — DULOXETINE 60 MG: 60 CAPSULE, DELAYED RELEASE ORAL at 20:23

## 2025-05-05 RX ADMIN — TAMSULOSIN HYDROCHLORIDE 0.4 MG: 0.4 CAPSULE ORAL at 12:58

## 2025-05-05 RX ADMIN — NORTRIPTYLINE HYDROCHLORIDE 75 MG: 25 CAPSULE ORAL at 21:43

## 2025-05-05 RX ADMIN — PREGABALIN 200 MG: 100 CAPSULE ORAL at 21:42

## 2025-05-05 RX ADMIN — Medication 1 APPLIC: at 07:52

## 2025-05-05 RX ADMIN — PREGABALIN 200 MG: 100 CAPSULE ORAL at 07:49

## 2025-05-05 RX ADMIN — CEFAZOLIN 10: 10 INJECTION, POWDER, FOR SOLUTION INTRAVENOUS at 03:49

## 2025-05-05 RX ADMIN — ATORVASTATIN CALCIUM 40 MG: 40 TABLET, FILM COATED ORAL at 17:24

## 2025-05-05 RX ADMIN — INSULIN ASPART: 100 INJECTION, SOLUTION INTRAVENOUS; SUBCUTANEOUS at 12:42

## 2025-05-05 RX ADMIN — ENOXAPARIN SODIUM 60 MG: 60 INJECTION SUBCUTANEOUS at 20:24

## 2025-05-05 RX ADMIN — PREGABALIN 200 MG: 100 CAPSULE ORAL at 15:29

## 2025-05-05 RX ADMIN — DULOXETINE 60 MG: 60 CAPSULE, DELAYED RELEASE ORAL at 07:49

## 2025-05-05 RX ADMIN — INSULIN ASPART: 100 INJECTION, SOLUTION INTRAVENOUS; SUBCUTANEOUS at 16:27

## 2025-05-05 RX ADMIN — SODIUM CHLORIDE 1000: 900 INJECTION, SOLUTION INTRAVENOUS at 12:56

## 2025-05-05 RX ADMIN — NIFEDIPINE 30 MG: 30 TABLET, EXTENDED RELEASE ORAL at 07:49

## 2025-05-05 RX ADMIN — ENOXAPARIN SODIUM 60 MG: 60 INJECTION SUBCUTANEOUS at 07:49

## 2025-05-05 RX ADMIN — CEFAZOLIN 10: 10 INJECTION, POWDER, FOR SOLUTION INTRAVENOUS at 20:23

## 2025-05-05 RX ADMIN — PANTOPRAZOLE SODIUM 40 MG: 40 TABLET, DELAYED RELEASE ORAL at 07:49

## 2025-05-05 RX ADMIN — Medication 1 APPLIC: at 20:29

## 2025-05-06 VITALS — DIASTOLIC BLOOD PRESSURE: 80 MMHG | SYSTOLIC BLOOD PRESSURE: 142 MMHG

## 2025-05-06 VITALS — DIASTOLIC BLOOD PRESSURE: 63 MMHG | SYSTOLIC BLOOD PRESSURE: 114 MMHG

## 2025-05-06 VITALS — DIASTOLIC BLOOD PRESSURE: 89 MMHG | SYSTOLIC BLOOD PRESSURE: 155 MMHG

## 2025-05-06 LAB
BUN SERPL-MCNC: 43 MG/DL (ref 7–17)
CHLORIDE SERPL-SCNC: 102 MMOL/L (ref 98–107)
CO2 SERPL-SCNC: 29 MMOL/L (ref 22–30)
EGFR: 12.09
ESTIMATED CREATININE CLEARANCE: 22 ML/MIN
GLUCOSE - POINT OF CARE: 200 MG/DL (ref 70–99)
GLUCOSE - POINT OF CARE: 204 MG/DL (ref 70–99)
GLUCOSE - POINT OF CARE: 214 MG/DL (ref 70–99)
GLUCOSE - POINT OF CARE: 247 MG/DL (ref 70–99)
GLUCOSE SERPL-MCNC: 200 MG/DL (ref 70–99)
POTASSIUM SERPL-SCNC: 4.8 MMOL/L (ref 3.5–5.1)
SODIUM SERPL-SCNC: 141 MMOL/L (ref 135–145)

## 2025-05-06 RX ADMIN — Medication 1 APPLIC: at 20:17

## 2025-05-06 RX ADMIN — Medication 1 APPLIC: at 08:42

## 2025-05-06 RX ADMIN — INSULIN ASPART 3 UNITS: 100 INJECTION, SOLUTION INTRAVENOUS; SUBCUTANEOUS at 10:30

## 2025-05-06 RX ADMIN — INSULIN ASPART 3 UNITS: 100 INJECTION, SOLUTION INTRAVENOUS; SUBCUTANEOUS at 17:30

## 2025-05-06 RX ADMIN — DULOXETINE 60 MG: 60 CAPSULE, DELAYED RELEASE ORAL at 20:10

## 2025-05-06 RX ADMIN — PREGABALIN 200 MG: 100 CAPSULE ORAL at 21:45

## 2025-05-06 RX ADMIN — HEPARIN SODIUM 5000 UNITS: 5000 INJECTION, SOLUTION INTRAVENOUS; SUBCUTANEOUS at 20:15

## 2025-05-06 RX ADMIN — NEBIVOLOL 5 MG: 2.5 TABLET ORAL at 08:40

## 2025-05-06 RX ADMIN — INSULIN ASPART 3 UNITS: 100 INJECTION, SOLUTION INTRAVENOUS; SUBCUTANEOUS at 14:20

## 2025-05-06 RX ADMIN — PREGABALIN 200 MG: 100 CAPSULE ORAL at 15:22

## 2025-05-06 RX ADMIN — ATORVASTATIN CALCIUM 40 MG: 40 TABLET, FILM COATED ORAL at 17:32

## 2025-05-06 RX ADMIN — CEFAZOLIN 10: 10 INJECTION, POWDER, FOR SOLUTION INTRAVENOUS at 21:46

## 2025-05-06 RX ADMIN — PANTOPRAZOLE SODIUM 40 MG: 40 TABLET, DELAYED RELEASE ORAL at 08:40

## 2025-05-06 RX ADMIN — CEFAZOLIN 10: 10 INJECTION, POWDER, FOR SOLUTION INTRAVENOUS at 12:38

## 2025-05-06 RX ADMIN — SODIUM CHLORIDE 1000: 900 INJECTION, SOLUTION INTRAVENOUS at 08:43

## 2025-05-06 RX ADMIN — FAMOTIDINE 20 MG: 20 TABLET, FILM COATED ORAL at 08:41

## 2025-05-06 RX ADMIN — NORTRIPTYLINE HYDROCHLORIDE 75 MG: 25 CAPSULE ORAL at 21:45

## 2025-05-06 RX ADMIN — NIFEDIPINE: 30 TABLET, EXTENDED RELEASE ORAL at 08:41

## 2025-05-06 RX ADMIN — CEFAZOLIN 10: 10 INJECTION, POWDER, FOR SOLUTION INTRAVENOUS at 04:42

## 2025-05-06 RX ADMIN — NIFEDIPINE 30 MG: 30 TABLET, EXTENDED RELEASE ORAL at 20:10

## 2025-05-06 RX ADMIN — TAMSULOSIN HYDROCHLORIDE 0.4 MG: 0.4 CAPSULE ORAL at 08:41

## 2025-05-06 RX ADMIN — ENOXAPARIN SODIUM 60 MG: 60 INJECTION SUBCUTANEOUS at 08:41

## 2025-05-06 RX ADMIN — DULOXETINE 60 MG: 60 CAPSULE, DELAYED RELEASE ORAL at 08:40

## 2025-05-06 RX ADMIN — PREGABALIN 200 MG: 100 CAPSULE ORAL at 08:41

## 2025-05-07 VITALS — SYSTOLIC BLOOD PRESSURE: 112 MMHG | DIASTOLIC BLOOD PRESSURE: 62 MMHG

## 2025-05-07 VITALS — DIASTOLIC BLOOD PRESSURE: 75 MMHG | SYSTOLIC BLOOD PRESSURE: 142 MMHG

## 2025-05-07 VITALS — SYSTOLIC BLOOD PRESSURE: 145 MMHG | DIASTOLIC BLOOD PRESSURE: 75 MMHG

## 2025-05-07 VITALS — DIASTOLIC BLOOD PRESSURE: 85 MMHG | SYSTOLIC BLOOD PRESSURE: 153 MMHG

## 2025-05-07 VITALS — BODY MASS INDEX: 57.8 KG/M2

## 2025-05-07 LAB
BUN SERPL-MCNC: 45 MG/DL (ref 7–17)
CALCIUM SERPL-MCNC: 8.2 MG/DL (ref 8.4–10.2)
CHLORIDE SERPL-SCNC: 102 MMOL/L (ref 98–107)
CO2 SERPL-SCNC: 27 MMOL/L (ref 22–30)
EGFR: 11.74
ESTIMATED CREATININE CLEARANCE: 22 ML/MIN
GLUCOSE - POINT OF CARE: 100 MG/DL (ref 70–99)
GLUCOSE - POINT OF CARE: 124 MG/DL (ref 70–99)
GLUCOSE - POINT OF CARE: 142 MG/DL (ref 70–99)
GLUCOSE - POINT OF CARE: 288 MG/DL (ref 70–99)
GLUCOSE SERPL-MCNC: 282 MG/DL (ref 70–99)
POTASSIUM SERPL-SCNC: 4.7 MMOL/L (ref 3.5–5.1)
SODIUM SERPL-SCNC: 142 MMOL/L (ref 135–145)

## 2025-05-07 RX ADMIN — HEPARIN SODIUM 5000 UNITS: 5000 INJECTION, SOLUTION INTRAVENOUS; SUBCUTANEOUS at 22:02

## 2025-05-07 RX ADMIN — DULOXETINE 60 MG: 60 CAPSULE, DELAYED RELEASE ORAL at 22:01

## 2025-05-07 RX ADMIN — Medication 1 APPLIC: at 09:01

## 2025-05-07 RX ADMIN — CEFAZOLIN 10: 10 INJECTION, POWDER, FOR SOLUTION INTRAVENOUS at 10:47

## 2025-05-07 RX ADMIN — SODIUM CHLORIDE 1000: 900 INJECTION, SOLUTION INTRAVENOUS at 08:58

## 2025-05-07 RX ADMIN — Medication 1 APPLIC: at 22:04

## 2025-05-07 RX ADMIN — INSULIN ASPART 5 UNITS: 100 INJECTION, SOLUTION INTRAVENOUS; SUBCUTANEOUS at 08:37

## 2025-05-07 RX ADMIN — NEBIVOLOL 5 MG: 2.5 TABLET ORAL at 08:43

## 2025-05-07 RX ADMIN — DULOXETINE 60 MG: 60 CAPSULE, DELAYED RELEASE ORAL at 08:47

## 2025-05-07 RX ADMIN — ATORVASTATIN CALCIUM 40 MG: 40 TABLET, FILM COATED ORAL at 17:23

## 2025-05-07 RX ADMIN — PREGABALIN 200 MG: 100 CAPSULE ORAL at 22:05

## 2025-05-07 RX ADMIN — NIFEDIPINE 30 MG: 30 TABLET, EXTENDED RELEASE ORAL at 08:47

## 2025-05-07 RX ADMIN — NIFEDIPINE 30 MG: 30 TABLET, EXTENDED RELEASE ORAL at 22:33

## 2025-05-07 RX ADMIN — HEPARIN SODIUM 5000 UNITS: 5000 INJECTION, SOLUTION INTRAVENOUS; SUBCUTANEOUS at 08:43

## 2025-05-07 RX ADMIN — INSULIN ASPART: 100 INJECTION, SOLUTION INTRAVENOUS; SUBCUTANEOUS at 12:48

## 2025-05-07 RX ADMIN — CEFAZOLIN 10: 10 INJECTION, POWDER, FOR SOLUTION INTRAVENOUS at 22:04

## 2025-05-07 RX ADMIN — PREGABALIN 200 MG: 100 CAPSULE ORAL at 17:24

## 2025-05-07 RX ADMIN — PANTOPRAZOLE SODIUM 40 MG: 40 TABLET, DELAYED RELEASE ORAL at 08:43

## 2025-05-07 RX ADMIN — TAMSULOSIN HYDROCHLORIDE 0.4 MG: 0.4 CAPSULE ORAL at 08:43

## 2025-05-07 RX ADMIN — NORTRIPTYLINE HYDROCHLORIDE 75 MG: 25 CAPSULE ORAL at 22:33

## 2025-05-07 RX ADMIN — PREGABALIN 200 MG: 100 CAPSULE ORAL at 08:47

## 2025-05-08 VITALS — DIASTOLIC BLOOD PRESSURE: 90 MMHG | SYSTOLIC BLOOD PRESSURE: 170 MMHG

## 2025-05-08 VITALS — DIASTOLIC BLOOD PRESSURE: 74 MMHG | SYSTOLIC BLOOD PRESSURE: 135 MMHG

## 2025-05-08 VITALS — DIASTOLIC BLOOD PRESSURE: 64 MMHG | SYSTOLIC BLOOD PRESSURE: 130 MMHG

## 2025-05-08 VITALS — SYSTOLIC BLOOD PRESSURE: 131 MMHG | DIASTOLIC BLOOD PRESSURE: 82 MMHG

## 2025-05-08 LAB
BUN SERPL-MCNC: 46 MG/DL (ref 7–17)
CALCIUM SERPL-MCNC: 8.3 MG/DL (ref 8.4–10.2)
CHLORIDE SERPL-SCNC: 106 MMOL/L (ref 98–107)
CO2 SERPL-SCNC: 27 MMOL/L (ref 22–30)
EGFR: 11.74
ESTIMATED CREATININE CLEARANCE: 22 ML/MIN
GLUCOSE - POINT OF CARE: 156 MG/DL (ref 70–99)
GLUCOSE - POINT OF CARE: 168 MG/DL (ref 70–99)
GLUCOSE - POINT OF CARE: 206 MG/DL (ref 70–99)
GLUCOSE - POINT OF CARE: 224 MG/DL (ref 70–99)
GLUCOSE SERPL-MCNC: 172 MG/DL (ref 70–99)
POTASSIUM SERPL-SCNC: 4.7 MMOL/L (ref 3.5–5.1)
SODIUM SERPL-SCNC: 146 MMOL/L (ref 135–145)

## 2025-05-08 RX ADMIN — ATORVASTATIN CALCIUM 40 MG: 40 TABLET, FILM COATED ORAL at 17:09

## 2025-05-08 RX ADMIN — DULOXETINE 60 MG: 60 CAPSULE, DELAYED RELEASE ORAL at 20:37

## 2025-05-08 RX ADMIN — PREGABALIN 200 MG: 100 CAPSULE ORAL at 07:37

## 2025-05-08 RX ADMIN — SODIUM CHLORIDE: 900 INJECTION, SOLUTION INTRAVENOUS at 07:40

## 2025-05-08 RX ADMIN — NIFEDIPINE 30 MG: 30 TABLET, EXTENDED RELEASE ORAL at 07:37

## 2025-05-08 RX ADMIN — PREGABALIN 200 MG: 100 CAPSULE ORAL at 16:13

## 2025-05-08 RX ADMIN — Medication 1 APPLIC: at 22:51

## 2025-05-08 RX ADMIN — SODIUM CHLORIDE 1000: 900 INJECTION, SOLUTION INTRAVENOUS at 07:40

## 2025-05-08 RX ADMIN — NORTRIPTYLINE HYDROCHLORIDE 75 MG: 25 CAPSULE ORAL at 22:53

## 2025-05-08 RX ADMIN — NIFEDIPINE 30 MG: 30 TABLET, EXTENDED RELEASE ORAL at 20:38

## 2025-05-08 RX ADMIN — PANTOPRAZOLE SODIUM 40 MG: 40 TABLET, DELAYED RELEASE ORAL at 07:37

## 2025-05-08 RX ADMIN — CEFAZOLIN 10: 10 INJECTION, POWDER, FOR SOLUTION INTRAVENOUS at 16:06

## 2025-05-08 RX ADMIN — CEFAZOLIN: 10 INJECTION, POWDER, FOR SOLUTION INTRAVENOUS at 10:10

## 2025-05-08 RX ADMIN — TAMSULOSIN HYDROCHLORIDE 0.4 MG: 0.4 CAPSULE ORAL at 07:38

## 2025-05-08 RX ADMIN — HEPARIN SODIUM 5000 UNITS: 5000 INJECTION, SOLUTION INTRAVENOUS; SUBCUTANEOUS at 20:37

## 2025-05-08 RX ADMIN — HEPARIN SODIUM 5000 UNITS: 5000 INJECTION, SOLUTION INTRAVENOUS; SUBCUTANEOUS at 07:38

## 2025-05-08 RX ADMIN — CEFAZOLIN: 10 INJECTION, POWDER, FOR SOLUTION INTRAVENOUS at 15:45

## 2025-05-08 RX ADMIN — FAMOTIDINE 20 MG: 20 TABLET, FILM COATED ORAL at 07:38

## 2025-05-08 RX ADMIN — Medication 1 APPLIC: at 07:40

## 2025-05-08 RX ADMIN — NEBIVOLOL 5 MG: 2.5 TABLET ORAL at 07:36

## 2025-05-08 RX ADMIN — DULOXETINE 60 MG: 60 CAPSULE, DELAYED RELEASE ORAL at 07:37

## 2025-05-08 RX ADMIN — PREGABALIN 200 MG: 100 CAPSULE ORAL at 22:53

## 2025-05-09 VITALS — SYSTOLIC BLOOD PRESSURE: 148 MMHG | DIASTOLIC BLOOD PRESSURE: 80 MMHG

## 2025-05-09 VITALS — BODY MASS INDEX: 58.7 KG/M2

## 2025-05-09 VITALS — DIASTOLIC BLOOD PRESSURE: 82 MMHG | SYSTOLIC BLOOD PRESSURE: 147 MMHG

## 2025-05-09 VITALS — SYSTOLIC BLOOD PRESSURE: 154 MMHG | DIASTOLIC BLOOD PRESSURE: 87 MMHG

## 2025-05-09 LAB
BUN SERPL-MCNC: 49 MG/DL (ref 7–17)
CALCIUM SERPL-MCNC: 8.3 MG/DL (ref 8.4–10.2)
CHLORIDE SERPL-SCNC: 107 MMOL/L (ref 98–107)
CO2 SERPL-SCNC: 28 MMOL/L (ref 22–30)
EGFR: 13.67
ERYTHROCYTE [DISTWIDTH] IN BLOOD BY AUTOMATED COUNT: 16.8 % (ref 11.5–14.5)
ESTIMATED CREATININE CLEARANCE: 25 ML/MIN
GLUCOSE - POINT OF CARE: 135 MG/DL (ref 70–99)
GLUCOSE - POINT OF CARE: 160 MG/DL (ref 70–99)
GLUCOSE - POINT OF CARE: 162 MG/DL (ref 70–99)
GLUCOSE - POINT OF CARE: 170 MG/DL (ref 70–99)
GLUCOSE SERPL-MCNC: 131 MG/DL (ref 70–99)
HGB BLD-MCNC: 8.4 G/DL (ref 12–16)
MCV RBC AUTO: 85.9 FL (ref 81–99)
PLATELET # BLD AUTO: 259 10^3/UL (ref 130–400)
POTASSIUM SERPL-SCNC: 4.9 MMOL/L (ref 3.5–5.1)
SODIUM SERPL-SCNC: 145 MMOL/L (ref 135–145)

## 2025-05-09 RX ADMIN — ATORVASTATIN CALCIUM 40 MG: 40 TABLET, FILM COATED ORAL at 18:09

## 2025-05-09 RX ADMIN — SODIUM CHLORIDE 1000: 900 INJECTION, SOLUTION INTRAVENOUS at 14:31

## 2025-05-09 RX ADMIN — DULOXETINE 60 MG: 60 CAPSULE, DELAYED RELEASE ORAL at 07:55

## 2025-05-09 RX ADMIN — HEPARIN SODIUM 5000 UNITS: 5000 INJECTION, SOLUTION INTRAVENOUS; SUBCUTANEOUS at 21:31

## 2025-05-09 RX ADMIN — NEBIVOLOL 5 MG: 2.5 TABLET ORAL at 07:55

## 2025-05-09 RX ADMIN — PREGABALIN 200 MG: 100 CAPSULE ORAL at 21:28

## 2025-05-09 RX ADMIN — PREGABALIN 200 MG: 100 CAPSULE ORAL at 18:11

## 2025-05-09 RX ADMIN — TAMSULOSIN HYDROCHLORIDE 0.4 MG: 0.4 CAPSULE ORAL at 07:55

## 2025-05-09 RX ADMIN — SODIUM CHLORIDE: 900 INJECTION, SOLUTION INTRAVENOUS at 07:52

## 2025-05-09 RX ADMIN — HEPARIN SODIUM 5000 UNITS: 5000 INJECTION, SOLUTION INTRAVENOUS; SUBCUTANEOUS at 07:56

## 2025-05-09 RX ADMIN — Medication 1 APPLIC: at 21:35

## 2025-05-09 RX ADMIN — NORTRIPTYLINE HYDROCHLORIDE 75 MG: 25 CAPSULE ORAL at 21:29

## 2025-05-09 RX ADMIN — CEFAZOLIN 10: 10 INJECTION, POWDER, FOR SOLUTION INTRAVENOUS at 18:09

## 2025-05-09 RX ADMIN — NIFEDIPINE 30 MG: 30 TABLET, EXTENDED RELEASE ORAL at 07:55

## 2025-05-09 RX ADMIN — PREGABALIN 200 MG: 100 CAPSULE ORAL at 07:56

## 2025-05-09 RX ADMIN — ALTEPLASE 2 MG: 2.2 INJECTION, POWDER, LYOPHILIZED, FOR SOLUTION INTRAVENOUS at 05:34

## 2025-05-09 RX ADMIN — PANTOPRAZOLE SODIUM 40 MG: 40 TABLET, DELAYED RELEASE ORAL at 07:56

## 2025-05-09 RX ADMIN — DULOXETINE 60 MG: 60 CAPSULE, DELAYED RELEASE ORAL at 21:33

## 2025-05-09 RX ADMIN — CEFAZOLIN 10: 10 INJECTION, POWDER, FOR SOLUTION INTRAVENOUS at 03:52

## 2025-05-09 RX ADMIN — NIFEDIPINE 30 MG: 30 TABLET, EXTENDED RELEASE ORAL at 21:44

## 2025-05-09 RX ADMIN — Medication 1 APPLIC: at 07:57

## 2025-05-09 RX ADMIN — LOPERAMIDE HYDROCHLORIDE 2 MG: 2 CAPSULE ORAL at 22:13

## 2025-05-10 VITALS — DIASTOLIC BLOOD PRESSURE: 63 MMHG | SYSTOLIC BLOOD PRESSURE: 125 MMHG

## 2025-05-10 VITALS — DIASTOLIC BLOOD PRESSURE: 64 MMHG | SYSTOLIC BLOOD PRESSURE: 118 MMHG

## 2025-05-10 VITALS — SYSTOLIC BLOOD PRESSURE: 147 MMHG | DIASTOLIC BLOOD PRESSURE: 70 MMHG

## 2025-05-10 LAB
BUN SERPL-MCNC: 39 MG/DL (ref 7–17)
CALCIUM SERPL-MCNC: 8.2 MG/DL (ref 8.4–10.2)
CHLORIDE SERPL-SCNC: 106 MMOL/L (ref 98–107)
CO2 SERPL-SCNC: 30 MMOL/L (ref 22–30)
EGFR: 16.27
ESTIMATED CREATININE CLEARANCE: 29 ML/MIN
GLUCOSE - POINT OF CARE: 127 MG/DL (ref 70–99)
GLUCOSE - POINT OF CARE: 156 MG/DL (ref 70–99)
GLUCOSE - POINT OF CARE: 206 MG/DL (ref 70–99)
GLUCOSE - POINT OF CARE: 268 MG/DL (ref 70–99)
GLUCOSE SERPL-MCNC: 130 MG/DL (ref 70–99)
POTASSIUM SERPL-SCNC: 4.5 MMOL/L (ref 3.5–5.1)
SODIUM SERPL-SCNC: 145 MMOL/L (ref 135–145)

## 2025-05-10 RX ADMIN — ATORVASTATIN CALCIUM 40 MG: 40 TABLET, FILM COATED ORAL at 17:18

## 2025-05-10 RX ADMIN — HEPARIN SODIUM 5000 UNITS: 5000 INJECTION, SOLUTION INTRAVENOUS; SUBCUTANEOUS at 20:54

## 2025-05-10 RX ADMIN — NIFEDIPINE 30 MG: 30 TABLET, EXTENDED RELEASE ORAL at 08:58

## 2025-05-10 RX ADMIN — PREGABALIN 200 MG: 100 CAPSULE ORAL at 17:18

## 2025-05-10 RX ADMIN — HEPARIN SODIUM 5000 UNITS: 5000 INJECTION, SOLUTION INTRAVENOUS; SUBCUTANEOUS at 09:00

## 2025-05-10 RX ADMIN — LOPERAMIDE HYDROCHLORIDE 2 MG: 2 CAPSULE ORAL at 04:25

## 2025-05-10 RX ADMIN — DULOXETINE 60 MG: 60 CAPSULE, DELAYED RELEASE ORAL at 20:54

## 2025-05-10 RX ADMIN — NORTRIPTYLINE HYDROCHLORIDE 75 MG: 25 CAPSULE ORAL at 21:00

## 2025-05-10 RX ADMIN — TAMSULOSIN HYDROCHLORIDE 0.4 MG: 0.4 CAPSULE ORAL at 08:58

## 2025-05-10 RX ADMIN — SODIUM CHLORIDE 1000: 900 INJECTION, SOLUTION INTRAVENOUS at 21:09

## 2025-05-10 RX ADMIN — NIFEDIPINE 30 MG: 30 TABLET, EXTENDED RELEASE ORAL at 20:58

## 2025-05-10 RX ADMIN — PANTOPRAZOLE SODIUM 40 MG: 40 TABLET, DELAYED RELEASE ORAL at 08:58

## 2025-05-10 RX ADMIN — PREGABALIN 200 MG: 100 CAPSULE ORAL at 20:59

## 2025-05-10 RX ADMIN — CEFAZOLIN 10: 10 INJECTION, POWDER, FOR SOLUTION INTRAVENOUS at 04:18

## 2025-05-10 RX ADMIN — FAMOTIDINE 20 MG: 20 TABLET, FILM COATED ORAL at 08:59

## 2025-05-10 RX ADMIN — Medication 1 APPLIC: at 13:00

## 2025-05-10 RX ADMIN — NEBIVOLOL 5 MG: 2.5 TABLET ORAL at 08:58

## 2025-05-10 RX ADMIN — DULOXETINE 60 MG: 60 CAPSULE, DELAYED RELEASE ORAL at 08:59

## 2025-05-10 RX ADMIN — PREGABALIN 200 MG: 100 CAPSULE ORAL at 08:59

## 2025-05-10 RX ADMIN — SODIUM CHLORIDE: 900 INJECTION, SOLUTION INTRAVENOUS at 13:00

## 2025-05-10 RX ADMIN — Medication 1 APPLIC: at 20:59

## 2025-05-10 RX ADMIN — CEFAZOLIN 10: 10 INJECTION, POWDER, FOR SOLUTION INTRAVENOUS at 17:17

## 2025-05-11 VITALS — SYSTOLIC BLOOD PRESSURE: 143 MMHG | DIASTOLIC BLOOD PRESSURE: 80 MMHG

## 2025-05-11 VITALS — OXYGEN SATURATION: 91 % | DIASTOLIC BLOOD PRESSURE: 69 MMHG | HEART RATE: 83 BPM | SYSTOLIC BLOOD PRESSURE: 118 MMHG

## 2025-05-11 VITALS — SYSTOLIC BLOOD PRESSURE: 139 MMHG | DIASTOLIC BLOOD PRESSURE: 73 MMHG

## 2025-05-11 VITALS — SYSTOLIC BLOOD PRESSURE: 136 MMHG | DIASTOLIC BLOOD PRESSURE: 68 MMHG

## 2025-05-11 LAB
BUN SERPL-MCNC: 38 MG/DL (ref 7–17)
CALCIUM SERPL-MCNC: 8.1 MG/DL (ref 8.4–10.2)
CHLORIDE SERPL-SCNC: 108 MMOL/L (ref 98–107)
CO2 SERPL-SCNC: 29 MMOL/L (ref 22–30)
EGFR: 18.31
ESTIMATED CREATININE CLEARANCE: 32 ML/MIN
GLUCOSE - POINT OF CARE: 103 MG/DL (ref 70–99)
GLUCOSE - POINT OF CARE: 128 MG/DL (ref 70–99)
GLUCOSE - POINT OF CARE: 222 MG/DL (ref 70–99)
GLUCOSE SERPL-MCNC: 123 MG/DL (ref 70–99)
HCT VFR BLD AUTO: 28.5 % (ref 37–47)
HGB BLD-MCNC: 8.4 G/DL (ref 12–16)
MCHC RBC AUTO-ENTMCNC: 29.5 G/DL (ref 33–37)
PLATELET # BLD AUTO: 245 10^3/UL (ref 130–400)
POTASSIUM SERPL-SCNC: 4.9 MMOL/L (ref 3.5–5.1)
SODIUM SERPL-SCNC: 144 MMOL/L (ref 135–145)

## 2025-05-11 RX ADMIN — NORTRIPTYLINE HYDROCHLORIDE 75 MG: 25 CAPSULE ORAL at 21:32

## 2025-05-11 RX ADMIN — PREGABALIN 200 MG: 100 CAPSULE ORAL at 17:25

## 2025-05-11 RX ADMIN — PREGABALIN 200 MG: 100 CAPSULE ORAL at 21:31

## 2025-05-11 RX ADMIN — DULOXETINE 60 MG: 60 CAPSULE, DELAYED RELEASE ORAL at 21:31

## 2025-05-11 RX ADMIN — SODIUM CHLORIDE 1000: 900 INJECTION, SOLUTION INTRAVENOUS at 16:02

## 2025-05-11 RX ADMIN — PANTOPRAZOLE SODIUM 40 MG: 40 TABLET, DELAYED RELEASE ORAL at 07:46

## 2025-05-11 RX ADMIN — Medication 1 APPLIC: at 21:36

## 2025-05-11 RX ADMIN — NIFEDIPINE 30 MG: 30 TABLET, EXTENDED RELEASE ORAL at 21:39

## 2025-05-11 RX ADMIN — HEPARIN SODIUM 5000 UNITS: 5000 INJECTION, SOLUTION INTRAVENOUS; SUBCUTANEOUS at 07:46

## 2025-05-11 RX ADMIN — NEBIVOLOL 5 MG: 2.5 TABLET ORAL at 07:51

## 2025-05-11 RX ADMIN — PREGABALIN 200 MG: 100 CAPSULE ORAL at 07:46

## 2025-05-11 RX ADMIN — CEFAZOLIN 10: 10 INJECTION, POWDER, FOR SOLUTION INTRAVENOUS at 04:22

## 2025-05-11 RX ADMIN — ATORVASTATIN CALCIUM 40 MG: 40 TABLET, FILM COATED ORAL at 17:25

## 2025-05-11 RX ADMIN — CEFAZOLIN 10: 10 INJECTION, POWDER, FOR SOLUTION INTRAVENOUS at 14:34

## 2025-05-11 RX ADMIN — CEFAZOLIN 10: 10 INJECTION, POWDER, FOR SOLUTION INTRAVENOUS at 21:39

## 2025-05-11 RX ADMIN — DULOXETINE 60 MG: 60 CAPSULE, DELAYED RELEASE ORAL at 07:45

## 2025-05-11 RX ADMIN — NIFEDIPINE 30 MG: 30 TABLET, EXTENDED RELEASE ORAL at 07:45

## 2025-05-11 RX ADMIN — TAMSULOSIN HYDROCHLORIDE 0.4 MG: 0.4 CAPSULE ORAL at 07:54

## 2025-05-11 RX ADMIN — Medication 1 APPLIC: at 09:46

## 2025-05-11 RX ADMIN — HEPARIN SODIUM 5000 UNITS: 5000 INJECTION, SOLUTION INTRAVENOUS; SUBCUTANEOUS at 21:32

## 2025-05-12 VITALS — BODY MASS INDEX: 59.7 KG/M2

## 2025-05-12 VITALS — DIASTOLIC BLOOD PRESSURE: 74 MMHG | SYSTOLIC BLOOD PRESSURE: 136 MMHG

## 2025-05-12 VITALS — DIASTOLIC BLOOD PRESSURE: 69 MMHG | SYSTOLIC BLOOD PRESSURE: 128 MMHG

## 2025-05-12 VITALS — DIASTOLIC BLOOD PRESSURE: 80 MMHG | SYSTOLIC BLOOD PRESSURE: 149 MMHG

## 2025-05-12 LAB
BUN SERPL-MCNC: 35 MG/DL (ref 7–17)
CALCIUM SERPL-MCNC: 8.2 MG/DL (ref 8.4–10.2)
CHLORIDE SERPL-SCNC: 109 MMOL/L (ref 98–107)
CO2 SERPL-SCNC: 26 MMOL/L (ref 22–30)
EGFR: 21.88
ESTIMATED CREATININE CLEARANCE: 37 ML/MIN
GLUCOSE - POINT OF CARE: 107 MG/DL (ref 70–99)
GLUCOSE - POINT OF CARE: 165 MG/DL (ref 70–99)
GLUCOSE - POINT OF CARE: 167 MG/DL (ref 70–99)
GLUCOSE - POINT OF CARE: 174 MG/DL (ref 70–99)
GLUCOSE SERPL-MCNC: 118 MG/DL (ref 70–99)
POTASSIUM SERPL-SCNC: 4.7 MMOL/L (ref 3.5–5.1)
SODIUM SERPL-SCNC: 144 MMOL/L (ref 135–145)

## 2025-05-12 RX ADMIN — Medication 1 APPLIC: at 21:50

## 2025-05-12 RX ADMIN — DULOXETINE 60 MG: 60 CAPSULE, DELAYED RELEASE ORAL at 08:40

## 2025-05-12 RX ADMIN — DULOXETINE 60 MG: 60 CAPSULE, DELAYED RELEASE ORAL at 21:46

## 2025-05-12 RX ADMIN — NEBIVOLOL 5 MG: 2.5 TABLET ORAL at 08:40

## 2025-05-12 RX ADMIN — SODIUM CHLORIDE 1000: 900 INJECTION, SOLUTION INTRAVENOUS at 15:03

## 2025-05-12 RX ADMIN — Medication 1 APPLIC: at 08:41

## 2025-05-12 RX ADMIN — ATORVASTATIN CALCIUM 40 MG: 40 TABLET, FILM COATED ORAL at 17:03

## 2025-05-12 RX ADMIN — FAMOTIDINE 20 MG: 20 TABLET, FILM COATED ORAL at 08:41

## 2025-05-12 RX ADMIN — PREGABALIN 200 MG: 100 CAPSULE ORAL at 21:44

## 2025-05-12 RX ADMIN — CEFAZOLIN 10: 10 INJECTION, POWDER, FOR SOLUTION INTRAVENOUS at 05:55

## 2025-05-12 RX ADMIN — CEFAZOLIN 10: 10 INJECTION, POWDER, FOR SOLUTION INTRAVENOUS at 15:03

## 2025-05-12 RX ADMIN — NIFEDIPINE 30 MG: 30 TABLET, EXTENDED RELEASE ORAL at 21:46

## 2025-05-12 RX ADMIN — CEFAZOLIN 10: 10 INJECTION, POWDER, FOR SOLUTION INTRAVENOUS at 21:42

## 2025-05-12 RX ADMIN — PREGABALIN 200 MG: 100 CAPSULE ORAL at 08:41

## 2025-05-12 RX ADMIN — HEPARIN SODIUM 5000 UNITS: 5000 INJECTION, SOLUTION INTRAVENOUS; SUBCUTANEOUS at 08:41

## 2025-05-12 RX ADMIN — NIFEDIPINE 30 MG: 30 TABLET, EXTENDED RELEASE ORAL at 08:40

## 2025-05-12 RX ADMIN — NORTRIPTYLINE HYDROCHLORIDE 75 MG: 25 CAPSULE ORAL at 21:44

## 2025-05-12 RX ADMIN — PANTOPRAZOLE SODIUM 40 MG: 40 TABLET, DELAYED RELEASE ORAL at 08:40

## 2025-05-12 RX ADMIN — TAMSULOSIN HYDROCHLORIDE 0.4 MG: 0.4 CAPSULE ORAL at 08:40

## 2025-05-12 RX ADMIN — FAMOTIDINE: 20 TABLET, FILM COATED ORAL at 10:26

## 2025-05-12 RX ADMIN — HEPARIN SODIUM 5000 UNITS: 5000 INJECTION, SOLUTION INTRAVENOUS; SUBCUTANEOUS at 21:46

## 2025-05-12 RX ADMIN — PREGABALIN 200 MG: 100 CAPSULE ORAL at 15:05

## 2025-05-13 VITALS — SYSTOLIC BLOOD PRESSURE: 115 MMHG | DIASTOLIC BLOOD PRESSURE: 77 MMHG

## 2025-05-13 VITALS — OXYGEN SATURATION: 90 % | HEART RATE: 71 BPM | SYSTOLIC BLOOD PRESSURE: 141 MMHG | DIASTOLIC BLOOD PRESSURE: 80 MMHG

## 2025-05-13 VITALS — SYSTOLIC BLOOD PRESSURE: 147 MMHG | DIASTOLIC BLOOD PRESSURE: 84 MMHG

## 2025-05-13 VITALS — BODY MASS INDEX: 59.6 KG/M2

## 2025-05-13 LAB
BUN SERPL-MCNC: 33 MG/DL (ref 7–17)
CALCIUM SERPL-MCNC: 8.5 MG/DL (ref 8.4–10.2)
CHLORIDE SERPL-SCNC: 111 MMOL/L (ref 98–107)
CO2 SERPL-SCNC: 25 MMOL/L (ref 22–30)
EGFR: 24.19
ESTIMATED CREATININE CLEARANCE: 41 ML/MIN
GLUCOSE - POINT OF CARE: 199 MG/DL (ref 70–99)
GLUCOSE - POINT OF CARE: 243 MG/DL (ref 70–99)
GLUCOSE - POINT OF CARE: 267 MG/DL (ref 70–99)
GLUCOSE SERPL-MCNC: 194 MG/DL (ref 70–99)
POTASSIUM SERPL-SCNC: 5.3 MMOL/L (ref 3.5–5.1)
SODIUM SERPL-SCNC: 143 MMOL/L (ref 135–145)

## 2025-05-13 RX ADMIN — DULOXETINE 60 MG: 60 CAPSULE, DELAYED RELEASE ORAL at 07:43

## 2025-05-13 RX ADMIN — PREGABALIN 200 MG: 100 CAPSULE ORAL at 07:44

## 2025-05-13 RX ADMIN — SODIUM CHLORIDE: 900 INJECTION, SOLUTION INTRAVENOUS at 10:26

## 2025-05-13 RX ADMIN — NIFEDIPINE: 30 TABLET, EXTENDED RELEASE ORAL at 21:10

## 2025-05-13 RX ADMIN — DULOXETINE 60 MG: 60 CAPSULE, DELAYED RELEASE ORAL at 21:09

## 2025-05-13 RX ADMIN — NORTRIPTYLINE HYDROCHLORIDE 75 MG: 25 CAPSULE ORAL at 21:11

## 2025-05-13 RX ADMIN — Medication 1 APPLIC: at 21:12

## 2025-05-13 RX ADMIN — CEFAZOLIN 10: 10 INJECTION, POWDER, FOR SOLUTION INTRAVENOUS at 05:09

## 2025-05-13 RX ADMIN — PREGABALIN 200 MG: 100 CAPSULE ORAL at 17:21

## 2025-05-13 RX ADMIN — HEPARIN SODIUM 5000 UNITS: 5000 INJECTION, SOLUTION INTRAVENOUS; SUBCUTANEOUS at 21:09

## 2025-05-13 RX ADMIN — SODIUM CHLORIDE: 900 INJECTION, SOLUTION INTRAVENOUS at 18:06

## 2025-05-13 RX ADMIN — FAMOTIDINE 20 MG: 20 TABLET, FILM COATED ORAL at 07:44

## 2025-05-13 RX ADMIN — CEFAZOLIN 10: 10 INJECTION, POWDER, FOR SOLUTION INTRAVENOUS at 21:26

## 2025-05-13 RX ADMIN — HEPARIN SODIUM 5000 UNITS: 5000 INJECTION, SOLUTION INTRAVENOUS; SUBCUTANEOUS at 07:45

## 2025-05-13 RX ADMIN — CEFAZOLIN 10: 10 INJECTION, POWDER, FOR SOLUTION INTRAVENOUS at 15:15

## 2025-05-13 RX ADMIN — ATORVASTATIN CALCIUM 40 MG: 40 TABLET, FILM COATED ORAL at 17:21

## 2025-05-13 RX ADMIN — Medication 1 APPLIC: at 07:44

## 2025-05-13 RX ADMIN — NEBIVOLOL 5 MG: 2.5 TABLET ORAL at 07:44

## 2025-05-13 RX ADMIN — PREGABALIN: 100 CAPSULE ORAL at 23:07

## 2025-05-13 RX ADMIN — PANTOPRAZOLE SODIUM 40 MG: 40 TABLET, DELAYED RELEASE ORAL at 07:44

## 2025-05-13 RX ADMIN — NIFEDIPINE 30 MG: 30 TABLET, EXTENDED RELEASE ORAL at 07:43

## 2025-05-13 RX ADMIN — TAMSULOSIN HYDROCHLORIDE 0.4 MG: 0.4 CAPSULE ORAL at 07:44

## 2025-05-14 VITALS — SYSTOLIC BLOOD PRESSURE: 144 MMHG | DIASTOLIC BLOOD PRESSURE: 81 MMHG

## 2025-05-14 VITALS — SYSTOLIC BLOOD PRESSURE: 143 MMHG | DIASTOLIC BLOOD PRESSURE: 86 MMHG

## 2025-05-14 VITALS — SYSTOLIC BLOOD PRESSURE: 172 MMHG | DIASTOLIC BLOOD PRESSURE: 84 MMHG

## 2025-05-14 VITALS — HEART RATE: 73 BPM

## 2025-05-14 VITALS — DIASTOLIC BLOOD PRESSURE: 70 MMHG | SYSTOLIC BLOOD PRESSURE: 111 MMHG

## 2025-05-14 VITALS — HEART RATE: 67 BPM

## 2025-05-14 VITALS — SYSTOLIC BLOOD PRESSURE: 130 MMHG | DIASTOLIC BLOOD PRESSURE: 82 MMHG

## 2025-05-14 VITALS — DIASTOLIC BLOOD PRESSURE: 97 MMHG | SYSTOLIC BLOOD PRESSURE: 127 MMHG

## 2025-05-14 VITALS — HEART RATE: 70 BPM

## 2025-05-14 VITALS — HEART RATE: 2 BPM

## 2025-05-14 LAB
ALBUMIN SERPL-MCNC: 3.3 G/DL (ref 3.5–5)
ALP SERPL-CCNC: 223 U/L (ref 38–126)
ALT SERPL-CCNC: < 10 U/L (ref 0–35)
AST SERPL-CCNC: 31 U/L (ref 14–36)
BASE EXCESS BLDA CALC-SCNC: 0.5 MMOL/L
BUN SERPL-MCNC: 37 MG/DL (ref 7–17)
BUN SERPL-MCNC: 38 MG/DL (ref 7–17)
CALCIUM SERPL-MCNC: 8.6 MG/DL (ref 8.4–10.2)
CALCIUM SERPL-MCNC: 8.7 MG/DL (ref 8.4–10.2)
CHLORIDE SERPL-SCNC: 106 MMOL/L (ref 98–107)
CHLORIDE SERPL-SCNC: 108 MMOL/L (ref 98–107)
CO2 SERPL-SCNC: 27 MMOL/L (ref 22–30)
CO2 SERPL-SCNC: 28 MMOL/L (ref 22–30)
EGFR: 25.51
EGFR: 25.51
ERYTHROCYTE [DISTWIDTH] IN BLOOD BY AUTOMATED COUNT: 16.9 % (ref 11.5–14.5)
ESTIMATED CREATININE CLEARANCE: 43 ML/MIN
ESTIMATED CREATININE CLEARANCE: 43 ML/MIN
GLUCOSE - POINT OF CARE: 107 MG/DL (ref 70–99)
GLUCOSE - POINT OF CARE: 155 MG/DL (ref 70–99)
GLUCOSE - POINT OF CARE: 82 MG/DL (ref 70–99)
GLUCOSE - POINT OF CARE: 90 MG/DL (ref 70–99)
GLUCOSE SERPL-MCNC: 101 MG/DL (ref 70–99)
GLUCOSE SERPL-MCNC: 158 MG/DL (ref 70–99)
HCO3 BLDA-SCNC: 27.2 MMOL/L (ref 21–28)
HCT VFR BLD AUTO: 27.3 % (ref 37–47)
HGB BLD-MCNC: 8.3 G/DL (ref 12–16)
MCHC RBC AUTO-ENTMCNC: 30.4 G/DL (ref 33–37)
NRBC BLD AUTO-RTO: 0.9 %
PCO2 BLDA: 54 MMHG (ref 32–35)
PLATELET # BLD AUTO: 248 10^3/UL (ref 130–400)
PO2 BLDA: 140 MMHG (ref 83–108)
POTASSIUM SERPL-SCNC: 4.8 MMOL/L (ref 3.5–5.1)
POTASSIUM SERPL-SCNC: 4.9 MMOL/L (ref 3.5–5.1)
PROT SERPL-MCNC: 6.5 G/DL (ref 6.3–8.2)
SAO2 % BLDA: 99.3 % (ref 94–98)
SODIUM SERPL-SCNC: 143 MMOL/L (ref 135–145)
SODIUM SERPL-SCNC: 144 MMOL/L (ref 135–145)
SQUAMOUS URNS QL MICRO: (no result) /LPF
URINE RED BLOOD CELL: (no result) /HPF (ref 0–2)
URINE WHITE CELL: (no result) /HPF (ref 0–5)
VENOUS BLOOD GAS B.E.: 0.5 MMOL/L
VENOUS BLOOD GAS O2 SAT %: 92.4 %

## 2025-05-14 RX ADMIN — NEBIVOLOL 5 MG: 2.5 TABLET ORAL at 07:59

## 2025-05-14 RX ADMIN — HEPARIN SODIUM 5000 UNITS: 5000 INJECTION, SOLUTION INTRAVENOUS; SUBCUTANEOUS at 08:00

## 2025-05-14 RX ADMIN — CEFAZOLIN 10: 10 INJECTION, POWDER, FOR SOLUTION INTRAVENOUS at 06:10

## 2025-05-14 RX ADMIN — HEPARIN SODIUM 5000 UNITS: 5000 INJECTION, SOLUTION INTRAVENOUS; SUBCUTANEOUS at 21:36

## 2025-05-14 RX ADMIN — Medication 1 APPLIC: at 08:00

## 2025-05-14 RX ADMIN — FAMOTIDINE 20 MG: 20 TABLET, FILM COATED ORAL at 08:00

## 2025-05-14 RX ADMIN — PREGABALIN 200 MG: 100 CAPSULE ORAL at 07:59

## 2025-05-14 RX ADMIN — FUROSEMIDE 20 MG: 10 INJECTION, SOLUTION INTRAMUSCULAR; INTRAVENOUS at 09:15

## 2025-05-14 RX ADMIN — FUROSEMIDE 40 MG: 40 TABLET ORAL at 08:00

## 2025-05-14 RX ADMIN — SODIUM CHLORIDE 10 ML: 9 INJECTION, SOLUTION INTRAMUSCULAR; INTRAVENOUS; SUBCUTANEOUS at 12:20

## 2025-05-14 RX ADMIN — CEFAZOLIN 10: 10 INJECTION, POWDER, FOR SOLUTION INTRAVENOUS at 14:59

## 2025-05-14 RX ADMIN — TAMSULOSIN HYDROCHLORIDE 0.4 MG: 0.4 CAPSULE ORAL at 08:00

## 2025-05-14 RX ADMIN — NIFEDIPINE 30 MG: 30 TABLET, EXTENDED RELEASE ORAL at 08:00

## 2025-05-14 RX ADMIN — PANTOPRAZOLE SODIUM 40 MG: 40 TABLET, DELAYED RELEASE ORAL at 08:00

## 2025-05-14 RX ADMIN — CEFAZOLIN 10: 10 INJECTION, POWDER, FOR SOLUTION INTRAVENOUS at 22:27

## 2025-05-14 RX ADMIN — DULOXETINE 60 MG: 60 CAPSULE, DELAYED RELEASE ORAL at 07:59

## 2025-05-14 RX ADMIN — Medication 1 APPLIC: at 22:26

## 2025-05-14 RX ADMIN — PANTOPRAZOLE SODIUM 40 MG: 40 INJECTION, POWDER, LYOPHILIZED, FOR SOLUTION INTRAVENOUS at 12:19

## 2025-05-15 VITALS — DIASTOLIC BLOOD PRESSURE: 107 MMHG | SYSTOLIC BLOOD PRESSURE: 144 MMHG

## 2025-05-15 VITALS — SYSTOLIC BLOOD PRESSURE: 132 MMHG | DIASTOLIC BLOOD PRESSURE: 68 MMHG

## 2025-05-15 VITALS — SYSTOLIC BLOOD PRESSURE: 130 MMHG | DIASTOLIC BLOOD PRESSURE: 69 MMHG

## 2025-05-15 VITALS — SYSTOLIC BLOOD PRESSURE: 142 MMHG | DIASTOLIC BLOOD PRESSURE: 69 MMHG

## 2025-05-15 VITALS — OXYGEN SATURATION: 94 % | DIASTOLIC BLOOD PRESSURE: 107 MMHG | HEART RATE: 90 BPM | SYSTOLIC BLOOD PRESSURE: 144 MMHG

## 2025-05-15 VITALS — HEART RATE: 2 BPM

## 2025-05-15 VITALS — DIASTOLIC BLOOD PRESSURE: 80 MMHG | SYSTOLIC BLOOD PRESSURE: 141 MMHG

## 2025-05-15 VITALS — HEART RATE: 91 BPM | SYSTOLIC BLOOD PRESSURE: 144 MMHG | OXYGEN SATURATION: 97 % | DIASTOLIC BLOOD PRESSURE: 107 MMHG

## 2025-05-15 VITALS — SYSTOLIC BLOOD PRESSURE: 122 MMHG | DIASTOLIC BLOOD PRESSURE: 81 MMHG

## 2025-05-15 VITALS — DIASTOLIC BLOOD PRESSURE: 80 MMHG | SYSTOLIC BLOOD PRESSURE: 142 MMHG

## 2025-05-15 VITALS — DIASTOLIC BLOOD PRESSURE: 78 MMHG | SYSTOLIC BLOOD PRESSURE: 122 MMHG

## 2025-05-15 VITALS — SYSTOLIC BLOOD PRESSURE: 138 MMHG | DIASTOLIC BLOOD PRESSURE: 77 MMHG

## 2025-05-15 VITALS — SYSTOLIC BLOOD PRESSURE: 128 MMHG | DIASTOLIC BLOOD PRESSURE: 111 MMHG

## 2025-05-15 VITALS — DIASTOLIC BLOOD PRESSURE: 94 MMHG | SYSTOLIC BLOOD PRESSURE: 144 MMHG

## 2025-05-15 VITALS — DIASTOLIC BLOOD PRESSURE: 93 MMHG | SYSTOLIC BLOOD PRESSURE: 134 MMHG

## 2025-05-15 VITALS — DIASTOLIC BLOOD PRESSURE: 80 MMHG | SYSTOLIC BLOOD PRESSURE: 143 MMHG

## 2025-05-15 VITALS — SYSTOLIC BLOOD PRESSURE: 144 MMHG | DIASTOLIC BLOOD PRESSURE: 76 MMHG

## 2025-05-15 VITALS — DIASTOLIC BLOOD PRESSURE: 76 MMHG | SYSTOLIC BLOOD PRESSURE: 139 MMHG

## 2025-05-15 VITALS — DIASTOLIC BLOOD PRESSURE: 69 MMHG | SYSTOLIC BLOOD PRESSURE: 129 MMHG

## 2025-05-15 VITALS — DIASTOLIC BLOOD PRESSURE: 96 MMHG | SYSTOLIC BLOOD PRESSURE: 154 MMHG

## 2025-05-15 VITALS — SYSTOLIC BLOOD PRESSURE: 137 MMHG | DIASTOLIC BLOOD PRESSURE: 92 MMHG

## 2025-05-15 VITALS — BODY MASS INDEX: 58.8 KG/M2

## 2025-05-15 VITALS — DIASTOLIC BLOOD PRESSURE: 82 MMHG | SYSTOLIC BLOOD PRESSURE: 146 MMHG

## 2025-05-15 LAB
BUN SERPL-MCNC: 34 MG/DL (ref 7–17)
CALCIUM SERPL-MCNC: 8.9 MG/DL (ref 8.4–10.2)
CHLORIDE SERPL-SCNC: 112 MMOL/L (ref 98–107)
CO2 SERPL-SCNC: 26 MMOL/L (ref 22–30)
ERYTHROCYTE [DISTWIDTH] IN BLOOD BY AUTOMATED COUNT: 17.3 % (ref 11.5–14.5)
ESTIMATED CREATININE CLEARANCE: 46 ML/MIN
GLUCOSE - POINT OF CARE: 107 MG/DL (ref 70–99)
GLUCOSE - POINT OF CARE: 285 MG/DL (ref 70–99)
GLUCOSE - POINT OF CARE: 288 MG/DL (ref 70–99)
GLUCOSE - POINT OF CARE: 84 MG/DL (ref 70–99)
GLUCOSE SERPL-MCNC: 83 MG/DL (ref 70–99)
HBA1C MFR BLD: 8.2 % (ref 4–5.6)
HCT VFR BLD AUTO: 28.1 % (ref 37–47)
HGB BLD-MCNC: 8.4 G/DL (ref 12–16)
MCHC RBC AUTO-ENTMCNC: 29.9 G/DL (ref 33–37)
MCV RBC AUTO: 85.4 FL (ref 81–99)
PLATELET # BLD AUTO: 247 10^3/UL (ref 130–400)
POTASSIUM SERPL-SCNC: 4.8 MMOL/L (ref 3.5–5.1)
SODIUM SERPL-SCNC: 147 MMOL/L (ref 135–145)

## 2025-05-15 RX ADMIN — NIFEDIPINE 30 MG: 30 TABLET, EXTENDED RELEASE ORAL at 19:58

## 2025-05-15 RX ADMIN — INSULIN GLARGINE 0.12 UNITS: 100 INJECTION, SOLUTION SUBCUTANEOUS at 22:23

## 2025-05-15 RX ADMIN — HEPARIN SODIUM 5000 UNITS: 5000 INJECTION, SOLUTION INTRAVENOUS; SUBCUTANEOUS at 07:54

## 2025-05-15 RX ADMIN — CEFAZOLIN 10: 10 INJECTION, POWDER, FOR SOLUTION INTRAVENOUS at 05:01

## 2025-05-15 RX ADMIN — PREGABALIN 100 MG: 100 CAPSULE ORAL at 07:57

## 2025-05-15 RX ADMIN — TAMSULOSIN HYDROCHLORIDE 0.4 MG: 0.4 CAPSULE ORAL at 07:53

## 2025-05-15 RX ADMIN — INSULIN ASPART 3 UNITS: 100 INJECTION, SOLUTION INTRAVENOUS; SUBCUTANEOUS at 17:07

## 2025-05-15 RX ADMIN — CEFAZOLIN 10: 10 INJECTION, POWDER, FOR SOLUTION INTRAVENOUS at 22:22

## 2025-05-15 RX ADMIN — Medication 1 APPLIC: at 07:55

## 2025-05-15 RX ADMIN — CEFAZOLIN 10: 10 INJECTION, POWDER, FOR SOLUTION INTRAVENOUS at 13:57

## 2025-05-15 RX ADMIN — PREGABALIN 100 MG: 100 CAPSULE ORAL at 19:57

## 2025-05-15 RX ADMIN — FAMOTIDINE 20 MG: 20 TABLET, FILM COATED ORAL at 07:52

## 2025-05-15 RX ADMIN — NIFEDIPINE 30 MG: 30 TABLET, EXTENDED RELEASE ORAL at 07:53

## 2025-05-15 RX ADMIN — DULOXETINE 60 MG: 60 CAPSULE, DELAYED RELEASE ORAL at 19:56

## 2025-05-15 RX ADMIN — PANTOPRAZOLE SODIUM 40 MG: 40 INJECTION, POWDER, LYOPHILIZED, FOR SOLUTION INTRAVENOUS at 07:53

## 2025-05-15 RX ADMIN — DULOXETINE 60 MG: 60 CAPSULE, DELAYED RELEASE ORAL at 07:53

## 2025-05-15 RX ADMIN — NEBIVOLOL 5 MG: 2.5 TABLET ORAL at 07:54

## 2025-05-15 RX ADMIN — Medication 1 APPLIC: at 19:58

## 2025-05-15 RX ADMIN — ATORVASTATIN CALCIUM 40 MG: 40 TABLET, FILM COATED ORAL at 17:07

## 2025-05-15 RX ADMIN — SODIUM CHLORIDE 10 ML: 9 INJECTION, SOLUTION INTRAMUSCULAR; INTRAVENOUS; SUBCUTANEOUS at 07:53

## 2025-05-15 RX ADMIN — HEPARIN SODIUM 5000 UNITS: 5000 INJECTION, SOLUTION INTRAVENOUS; SUBCUTANEOUS at 15:55

## 2025-05-15 RX ADMIN — FUROSEMIDE 40 MG: 10 INJECTION, SOLUTION INTRAMUSCULAR; INTRAVENOUS at 07:55

## 2025-05-16 VITALS — SYSTOLIC BLOOD PRESSURE: 138 MMHG | DIASTOLIC BLOOD PRESSURE: 84 MMHG

## 2025-05-16 VITALS — SYSTOLIC BLOOD PRESSURE: 151 MMHG | DIASTOLIC BLOOD PRESSURE: 83 MMHG

## 2025-05-16 VITALS — SYSTOLIC BLOOD PRESSURE: 145 MMHG | DIASTOLIC BLOOD PRESSURE: 80 MMHG

## 2025-05-16 VITALS — SYSTOLIC BLOOD PRESSURE: 148 MMHG | DIASTOLIC BLOOD PRESSURE: 88 MMHG

## 2025-05-16 VITALS — SYSTOLIC BLOOD PRESSURE: 135 MMHG | DIASTOLIC BLOOD PRESSURE: 57 MMHG

## 2025-05-16 VITALS — SYSTOLIC BLOOD PRESSURE: 109 MMHG | DIASTOLIC BLOOD PRESSURE: 76 MMHG

## 2025-05-16 VITALS — BODY MASS INDEX: 57.7 KG/M2

## 2025-05-16 VITALS — SYSTOLIC BLOOD PRESSURE: 109 MMHG | DIASTOLIC BLOOD PRESSURE: 65 MMHG

## 2025-05-16 VITALS — DIASTOLIC BLOOD PRESSURE: 82 MMHG | SYSTOLIC BLOOD PRESSURE: 120 MMHG

## 2025-05-16 VITALS — DIASTOLIC BLOOD PRESSURE: 82 MMHG | SYSTOLIC BLOOD PRESSURE: 147 MMHG

## 2025-05-16 VITALS — DIASTOLIC BLOOD PRESSURE: 93 MMHG | SYSTOLIC BLOOD PRESSURE: 140 MMHG

## 2025-05-16 VITALS — HEART RATE: 76 BPM

## 2025-05-16 VITALS — SYSTOLIC BLOOD PRESSURE: 122 MMHG | DIASTOLIC BLOOD PRESSURE: 73 MMHG

## 2025-05-16 VITALS — SYSTOLIC BLOOD PRESSURE: 152 MMHG | DIASTOLIC BLOOD PRESSURE: 82 MMHG

## 2025-05-16 LAB
BUN SERPL-MCNC: 30 MG/DL (ref 7–17)
CALCIUM SERPL-MCNC: 8.3 MG/DL (ref 8.4–10.2)
CHLORIDE SERPL-SCNC: 106 MMOL/L (ref 98–107)
CO2 SERPL-SCNC: 34 MMOL/L (ref 22–30)
EGFR: 34.76
ERYTHROCYTE [DISTWIDTH] IN BLOOD BY AUTOMATED COUNT: 17.2 % (ref 11.5–14.5)
ESTIMATED CREATININE CLEARANCE: 54 ML/MIN
GLUCOSE - POINT OF CARE: 164 MG/DL (ref 70–99)
GLUCOSE - POINT OF CARE: 205 MG/DL (ref 70–99)
GLUCOSE - POINT OF CARE: 263 MG/DL (ref 70–99)
GLUCOSE - POINT OF CARE: 278 MG/DL (ref 70–99)
GLUCOSE SERPL-MCNC: 240 MG/DL (ref 70–99)
HGB BLD-MCNC: 8.4 G/DL (ref 12–16)
MAGNESIUM SERPL-MCNC: 1.4 MG/DL (ref 1.6–2.3)
MCHC RBC AUTO-ENTMCNC: 31.1 G/DL (ref 33–37)
MCV RBC AUTO: 84.4 FL (ref 81–99)
PLATELET # BLD AUTO: 236 10^3/UL (ref 130–400)
POTASSIUM SERPL-SCNC: 4.5 MMOL/L (ref 3.5–5.1)
SODIUM SERPL-SCNC: 142 MMOL/L (ref 135–145)

## 2025-05-16 RX ADMIN — INSULIN ASPART 3 UNITS: 100 INJECTION, SOLUTION INTRAVENOUS; SUBCUTANEOUS at 08:44

## 2025-05-16 RX ADMIN — INSULIN ASPART 6 UNITS: 100 INJECTION, SOLUTION INTRAVENOUS; SUBCUTANEOUS at 13:29

## 2025-05-16 RX ADMIN — INSULIN ASPART 6 UNITS: 100 INJECTION, SOLUTION INTRAVENOUS; SUBCUTANEOUS at 18:00

## 2025-05-16 RX ADMIN — Medication 2 FLUSH: at 22:43

## 2025-05-16 RX ADMIN — CEFAZOLIN 10: 10 INJECTION, POWDER, FOR SOLUTION INTRAVENOUS at 14:36

## 2025-05-16 RX ADMIN — PANTOPRAZOLE SODIUM 40 MG: 40 INJECTION, POWDER, LYOPHILIZED, FOR SOLUTION INTRAVENOUS at 08:48

## 2025-05-16 RX ADMIN — HEPARIN SODIUM 5000 UNITS: 5000 INJECTION, SOLUTION INTRAVENOUS; SUBCUTANEOUS at 01:21

## 2025-05-16 RX ADMIN — Medication 1 APPLIC: at 08:47

## 2025-05-16 RX ADMIN — INSULIN ASPART: 100 INJECTION, SOLUTION INTRAVENOUS; SUBCUTANEOUS at 13:29

## 2025-05-16 RX ADMIN — INSULIN ASPART 3 UNITS: 100 INJECTION, SOLUTION INTRAVENOUS; SUBCUTANEOUS at 10:19

## 2025-05-16 RX ADMIN — TAMSULOSIN HYDROCHLORIDE 0.4 MG: 0.4 CAPSULE ORAL at 08:46

## 2025-05-16 RX ADMIN — MAGNESIUM SULFATE 100: 4 INJECTION INTRAVENOUS at 14:36

## 2025-05-16 RX ADMIN — ACETAMINOPHEN 650 MG: 325 TABLET ORAL at 04:51

## 2025-05-16 RX ADMIN — FAMOTIDINE 20 MG: 20 TABLET, FILM COATED ORAL at 08:48

## 2025-05-16 RX ADMIN — PREGABALIN 100 MG: 100 CAPSULE ORAL at 08:46

## 2025-05-16 RX ADMIN — INSULIN ASPART: 100 INJECTION, SOLUTION INTRAVENOUS; SUBCUTANEOUS at 13:24

## 2025-05-16 RX ADMIN — INSULIN GLARGINE 0.24 UNITS: 100 INJECTION, SOLUTION SUBCUTANEOUS at 22:44

## 2025-05-16 RX ADMIN — CEFAZOLIN 10: 10 INJECTION, POWDER, FOR SOLUTION INTRAVENOUS at 22:40

## 2025-05-16 RX ADMIN — INSULIN ASPART: 100 INJECTION, SOLUTION INTRAVENOUS; SUBCUTANEOUS at 13:25

## 2025-05-16 RX ADMIN — FUROSEMIDE 40 MG: 10 INJECTION, SOLUTION INTRAMUSCULAR; INTRAVENOUS at 08:47

## 2025-05-16 RX ADMIN — PREGABALIN 100 MG: 100 CAPSULE ORAL at 20:06

## 2025-05-16 RX ADMIN — ATORVASTATIN CALCIUM 40 MG: 40 TABLET, FILM COATED ORAL at 17:17

## 2025-05-16 RX ADMIN — Medication 1 APPLIC: at 22:39

## 2025-05-16 RX ADMIN — CEFAZOLIN 10: 10 INJECTION, POWDER, FOR SOLUTION INTRAVENOUS at 05:00

## 2025-05-16 RX ADMIN — DULOXETINE 60 MG: 60 CAPSULE, DELAYED RELEASE ORAL at 08:45

## 2025-05-16 RX ADMIN — SODIUM CHLORIDE 10 ML: 9 INJECTION, SOLUTION INTRAMUSCULAR; INTRAVENOUS; SUBCUTANEOUS at 08:48

## 2025-05-16 RX ADMIN — NIFEDIPINE 30 MG: 30 TABLET, EXTENDED RELEASE ORAL at 20:07

## 2025-05-16 RX ADMIN — HEPARIN SODIUM 5000 UNITS: 5000 INJECTION, SOLUTION INTRAVENOUS; SUBCUTANEOUS at 17:16

## 2025-05-16 RX ADMIN — NIFEDIPINE 30 MG: 30 TABLET, EXTENDED RELEASE ORAL at 08:45

## 2025-05-16 RX ADMIN — HUMAN INSULIN 0.12 UNITS: 100 INJECTION, SUSPENSION SUBCUTANEOUS at 10:18

## 2025-05-16 RX ADMIN — DULOXETINE 60 MG: 60 CAPSULE, DELAYED RELEASE ORAL at 20:07

## 2025-05-16 RX ADMIN — HEPARIN SODIUM 5000 UNITS: 5000 INJECTION, SOLUTION INTRAVENOUS; SUBCUTANEOUS at 22:45

## 2025-05-16 RX ADMIN — NEBIVOLOL 5 MG: 2.5 TABLET ORAL at 08:45

## 2025-05-16 RX ADMIN — INSULIN ASPART 1 UNITS: 100 INJECTION, SOLUTION INTRAVENOUS; SUBCUTANEOUS at 18:00

## 2025-05-16 RX ADMIN — HEPARIN SODIUM 5000 UNITS: 5000 INJECTION, SOLUTION INTRAVENOUS; SUBCUTANEOUS at 08:46

## 2025-05-16 RX ADMIN — ALBUTEROL SULFATE 2.5 MG: 2.5 SOLUTION RESPIRATORY (INHALATION) at 03:05

## 2025-05-17 VITALS — DIASTOLIC BLOOD PRESSURE: 131 MMHG | SYSTOLIC BLOOD PRESSURE: 146 MMHG

## 2025-05-17 VITALS — DIASTOLIC BLOOD PRESSURE: 82 MMHG | SYSTOLIC BLOOD PRESSURE: 122 MMHG

## 2025-05-17 VITALS — DIASTOLIC BLOOD PRESSURE: 50 MMHG | SYSTOLIC BLOOD PRESSURE: 75 MMHG

## 2025-05-17 VITALS — SYSTOLIC BLOOD PRESSURE: 122 MMHG | DIASTOLIC BLOOD PRESSURE: 76 MMHG

## 2025-05-17 VITALS — DIASTOLIC BLOOD PRESSURE: 98 MMHG | SYSTOLIC BLOOD PRESSURE: 135 MMHG

## 2025-05-17 VITALS — HEART RATE: 2 BPM

## 2025-05-17 VITALS — SYSTOLIC BLOOD PRESSURE: 123 MMHG | DIASTOLIC BLOOD PRESSURE: 68 MMHG

## 2025-05-17 VITALS — DIASTOLIC BLOOD PRESSURE: 80 MMHG | SYSTOLIC BLOOD PRESSURE: 139 MMHG

## 2025-05-17 VITALS — DIASTOLIC BLOOD PRESSURE: 74 MMHG | SYSTOLIC BLOOD PRESSURE: 142 MMHG

## 2025-05-17 VITALS — DIASTOLIC BLOOD PRESSURE: 73 MMHG | SYSTOLIC BLOOD PRESSURE: 136 MMHG

## 2025-05-17 VITALS — SYSTOLIC BLOOD PRESSURE: 127 MMHG | DIASTOLIC BLOOD PRESSURE: 79 MMHG

## 2025-05-17 VITALS — SYSTOLIC BLOOD PRESSURE: 134 MMHG | DIASTOLIC BLOOD PRESSURE: 75 MMHG

## 2025-05-17 VITALS — DIASTOLIC BLOOD PRESSURE: 73 MMHG | SYSTOLIC BLOOD PRESSURE: 141 MMHG

## 2025-05-17 VITALS — SYSTOLIC BLOOD PRESSURE: 126 MMHG | DIASTOLIC BLOOD PRESSURE: 71 MMHG

## 2025-05-17 VITALS — DIASTOLIC BLOOD PRESSURE: 125 MMHG | SYSTOLIC BLOOD PRESSURE: 137 MMHG

## 2025-05-17 VITALS — HEART RATE: 77 BPM

## 2025-05-17 VITALS — BODY MASS INDEX: 56.7 KG/M2

## 2025-05-17 LAB
BUN SERPL-MCNC: 30 MG/DL (ref 7–17)
CALCIUM SERPL-MCNC: 8.4 MG/DL (ref 8.4–10.2)
CHLORIDE SERPL-SCNC: 103 MMOL/L (ref 98–107)
CO2 SERPL-SCNC: 36 MMOL/L (ref 22–30)
EGFR: 40.39
ESTIMATED CREATININE CLEARANCE: 61 ML/MIN
GLUCOSE - POINT OF CARE: 170 MG/DL (ref 70–99)
GLUCOSE - POINT OF CARE: 184 MG/DL (ref 70–99)
GLUCOSE - POINT OF CARE: 198 MG/DL (ref 70–99)
GLUCOSE - POINT OF CARE: 75 MG/DL (ref 70–99)
GLUCOSE SERPL-MCNC: 195 MG/DL (ref 70–99)
POTASSIUM SERPL-SCNC: 4.6 MMOL/L (ref 3.5–5.1)
SODIUM SERPL-SCNC: 145 MMOL/L (ref 135–145)

## 2025-05-17 RX ADMIN — CEFAZOLIN 10: 10 INJECTION, POWDER, FOR SOLUTION INTRAVENOUS at 14:22

## 2025-05-17 RX ADMIN — ATORVASTATIN CALCIUM 40 MG: 40 TABLET, FILM COATED ORAL at 17:57

## 2025-05-17 RX ADMIN — HEPARIN SODIUM 5000 UNITS: 5000 INJECTION, SOLUTION INTRAVENOUS; SUBCUTANEOUS at 17:55

## 2025-05-17 RX ADMIN — FAMOTIDINE 20 MG: 20 TABLET, FILM COATED ORAL at 08:00

## 2025-05-17 RX ADMIN — INSULIN ASPART 8 UNITS: 100 INJECTION, SOLUTION INTRAVENOUS; SUBCUTANEOUS at 13:18

## 2025-05-17 RX ADMIN — DULOXETINE 60 MG: 60 CAPSULE, DELAYED RELEASE ORAL at 07:59

## 2025-05-17 RX ADMIN — DULOXETINE 60 MG: 60 CAPSULE, DELAYED RELEASE ORAL at 19:58

## 2025-05-17 RX ADMIN — PREGABALIN 100 MG: 100 CAPSULE ORAL at 19:58

## 2025-05-17 RX ADMIN — Medication 1 APPLIC: at 08:00

## 2025-05-17 RX ADMIN — CEFAZOLIN 10: 10 INJECTION, POWDER, FOR SOLUTION INTRAVENOUS at 22:11

## 2025-05-17 RX ADMIN — HEPARIN SODIUM 5000 UNITS: 5000 INJECTION, SOLUTION INTRAVENOUS; SUBCUTANEOUS at 08:00

## 2025-05-17 RX ADMIN — CEFAZOLIN 10: 10 INJECTION, POWDER, FOR SOLUTION INTRAVENOUS at 06:06

## 2025-05-17 RX ADMIN — Medication 1 APPLIC: at 20:03

## 2025-05-17 RX ADMIN — INSULIN ASPART 1 UNITS: 100 INJECTION, SOLUTION INTRAVENOUS; SUBCUTANEOUS at 08:08

## 2025-05-17 RX ADMIN — INSULIN ASPART 8 UNITS: 100 INJECTION, SOLUTION INTRAVENOUS; SUBCUTANEOUS at 17:56

## 2025-05-17 RX ADMIN — NIFEDIPINE 30 MG: 30 TABLET, EXTENDED RELEASE ORAL at 08:02

## 2025-05-17 RX ADMIN — Medication 2 FLUSH: at 08:12

## 2025-05-17 RX ADMIN — Medication 2 FLUSH: at 14:23

## 2025-05-17 RX ADMIN — PREGABALIN 100 MG: 100 CAPSULE ORAL at 08:00

## 2025-05-17 RX ADMIN — INSULIN GLARGINE 0.26 UNITS: 100 INJECTION, SOLUTION SUBCUTANEOUS at 22:11

## 2025-05-17 RX ADMIN — HEPARIN SODIUM 5000 UNITS: 5000 INJECTION, SOLUTION INTRAVENOUS; SUBCUTANEOUS at 23:37

## 2025-05-17 RX ADMIN — NIFEDIPINE 30 MG: 30 TABLET, EXTENDED RELEASE ORAL at 19:57

## 2025-05-17 RX ADMIN — PANTOPRAZOLE SODIUM 40 MG: 40 TABLET, DELAYED RELEASE ORAL at 08:02

## 2025-05-17 RX ADMIN — INSULIN ASPART 1 UNITS: 100 INJECTION, SOLUTION INTRAVENOUS; SUBCUTANEOUS at 13:17

## 2025-05-17 RX ADMIN — INSULIN ASPART: 100 INJECTION, SOLUTION INTRAVENOUS; SUBCUTANEOUS at 17:56

## 2025-05-17 RX ADMIN — INSULIN ASPART 6 UNITS: 100 INJECTION, SOLUTION INTRAVENOUS; SUBCUTANEOUS at 08:09

## 2025-05-17 RX ADMIN — NEBIVOLOL 5 MG: 2.5 TABLET ORAL at 07:58

## 2025-05-17 RX ADMIN — TAMSULOSIN HYDROCHLORIDE 0.4 MG: 0.4 CAPSULE ORAL at 07:59

## 2025-05-17 RX ADMIN — FUROSEMIDE 40 MG: 10 INJECTION, SOLUTION INTRAMUSCULAR; INTRAVENOUS at 08:01

## 2025-05-18 VITALS — DIASTOLIC BLOOD PRESSURE: 117 MMHG | SYSTOLIC BLOOD PRESSURE: 158 MMHG

## 2025-05-18 VITALS — OXYGEN SATURATION: 85 % | SYSTOLIC BLOOD PRESSURE: 135 MMHG | DIASTOLIC BLOOD PRESSURE: 76 MMHG

## 2025-05-18 VITALS — SYSTOLIC BLOOD PRESSURE: 196 MMHG | DIASTOLIC BLOOD PRESSURE: 110 MMHG

## 2025-05-18 VITALS — DIASTOLIC BLOOD PRESSURE: 66 MMHG | SYSTOLIC BLOOD PRESSURE: 124 MMHG

## 2025-05-18 VITALS — BODY MASS INDEX: 56.1 KG/M2

## 2025-05-18 VITALS — DIASTOLIC BLOOD PRESSURE: 69 MMHG | SYSTOLIC BLOOD PRESSURE: 150 MMHG

## 2025-05-18 VITALS — DIASTOLIC BLOOD PRESSURE: 73 MMHG | SYSTOLIC BLOOD PRESSURE: 137 MMHG

## 2025-05-18 VITALS — SYSTOLIC BLOOD PRESSURE: 145 MMHG | DIASTOLIC BLOOD PRESSURE: 72 MMHG

## 2025-05-18 VITALS — DIASTOLIC BLOOD PRESSURE: 101 MMHG | SYSTOLIC BLOOD PRESSURE: 190 MMHG

## 2025-05-18 VITALS — SYSTOLIC BLOOD PRESSURE: 156 MMHG | DIASTOLIC BLOOD PRESSURE: 88 MMHG

## 2025-05-18 VITALS — DIASTOLIC BLOOD PRESSURE: 89 MMHG | SYSTOLIC BLOOD PRESSURE: 173 MMHG

## 2025-05-18 VITALS — DIASTOLIC BLOOD PRESSURE: 76 MMHG | HEART RATE: 85 BPM | OXYGEN SATURATION: 97 % | SYSTOLIC BLOOD PRESSURE: 135 MMHG

## 2025-05-18 LAB
ALBUMIN SERPL-MCNC: 3.5 G/DL (ref 3.5–5)
ALP SERPL-CCNC: 199 U/L (ref 38–126)
ALT SERPL-CCNC: < 10 U/L (ref 0–35)
AST SERPL-CCNC: 18 U/L (ref 14–36)
BUN SERPL-MCNC: 25 MG/DL (ref 7–17)
CALCIUM SERPL-MCNC: 8.4 MG/DL (ref 8.4–10.2)
CHLORIDE SERPL-SCNC: 103 MMOL/L (ref 98–107)
CO2 SERPL-SCNC: 35 MMOL/L (ref 22–30)
EGFR: 43.88
ERYTHROCYTE [DISTWIDTH] IN BLOOD BY AUTOMATED COUNT: 17.5 % (ref 11.5–14.5)
ESTIMATED CREATININE CLEARANCE: 64 ML/MIN
GLUCOSE - POINT OF CARE: 196 MG/DL (ref 70–99)
GLUCOSE - POINT OF CARE: 214 MG/DL (ref 70–99)
GLUCOSE - POINT OF CARE: 219 MG/DL (ref 70–99)
GLUCOSE - POINT OF CARE: 251 MG/DL (ref 70–99)
GLUCOSE SERPL-MCNC: 170 MG/DL (ref 70–99)
HCT VFR BLD AUTO: 28.8 % (ref 37–47)
HGB BLD-MCNC: 8.5 G/DL (ref 12–16)
MAGNESIUM SERPL-MCNC: 1.7 MG/DL (ref 1.6–2.3)
MCHC RBC AUTO-ENTMCNC: 29.5 G/DL (ref 33–37)
PLATELET # BLD AUTO: 231 10^3/UL (ref 130–400)
PROT SERPL-MCNC: 6.8 G/DL (ref 6.3–8.2)
SODIUM SERPL-SCNC: 144 MMOL/L (ref 135–145)

## 2025-05-18 RX ADMIN — INSULIN ASPART: 100 INJECTION, SOLUTION INTRAVENOUS; SUBCUTANEOUS at 12:42

## 2025-05-18 RX ADMIN — INSULIN ASPART 2 UNITS: 100 INJECTION, SOLUTION INTRAVENOUS; SUBCUTANEOUS at 12:43

## 2025-05-18 RX ADMIN — ATORVASTATIN CALCIUM 40 MG: 40 TABLET, FILM COATED ORAL at 17:28

## 2025-05-18 RX ADMIN — CEFAZOLIN 10: 10 INJECTION, POWDER, FOR SOLUTION INTRAVENOUS at 13:13

## 2025-05-18 RX ADMIN — CEFAZOLIN 10: 10 INJECTION, POWDER, FOR SOLUTION INTRAVENOUS at 21:55

## 2025-05-18 RX ADMIN — INSULIN ASPART 8 UNITS: 100 INJECTION, SOLUTION INTRAVENOUS; SUBCUTANEOUS at 09:43

## 2025-05-18 RX ADMIN — Medication 2 FLUSH: at 13:13

## 2025-05-18 RX ADMIN — FAMOTIDINE 20 MG: 20 TABLET, FILM COATED ORAL at 09:47

## 2025-05-18 RX ADMIN — PREGABALIN 100 MG: 100 CAPSULE ORAL at 09:47

## 2025-05-18 RX ADMIN — HYDRALAZINE HYDROCHLORIDE 10 MG: 20 INJECTION INTRAMUSCULAR; INTRAVENOUS at 15:15

## 2025-05-18 RX ADMIN — NIFEDIPINE: 30 TABLET, EXTENDED RELEASE ORAL at 09:48

## 2025-05-18 RX ADMIN — PANTOPRAZOLE SODIUM 40 MG: 40 TABLET, DELAYED RELEASE ORAL at 09:47

## 2025-05-18 RX ADMIN — HEPARIN SODIUM 5000 UNITS: 5000 INJECTION, SOLUTION INTRAVENOUS; SUBCUTANEOUS at 23:06

## 2025-05-18 RX ADMIN — TAMSULOSIN HYDROCHLORIDE 0.4 MG: 0.4 CAPSULE ORAL at 09:46

## 2025-05-18 RX ADMIN — INSULIN ASPART 3 UNITS: 100 INJECTION, SOLUTION INTRAVENOUS; SUBCUTANEOUS at 09:43

## 2025-05-18 RX ADMIN — HEPARIN SODIUM 5000 UNITS: 5000 INJECTION, SOLUTION INTRAVENOUS; SUBCUTANEOUS at 15:17

## 2025-05-18 RX ADMIN — Medication 1 APPLIC: at 11:23

## 2025-05-18 RX ADMIN — NEBIVOLOL 5 MG: 2.5 TABLET ORAL at 11:22

## 2025-05-18 RX ADMIN — DULOXETINE 60 MG: 60 CAPSULE, DELAYED RELEASE ORAL at 20:02

## 2025-05-18 RX ADMIN — INSULIN ASPART 8 UNITS: 100 INJECTION, SOLUTION INTRAVENOUS; SUBCUTANEOUS at 17:27

## 2025-05-18 RX ADMIN — CEFAZOLIN 10: 10 INJECTION, POWDER, FOR SOLUTION INTRAVENOUS at 05:34

## 2025-05-18 RX ADMIN — Medication 2 FLUSH: at 05:34

## 2025-05-18 RX ADMIN — NIFEDIPINE 30 MG: 30 TABLET, EXTENDED RELEASE ORAL at 20:02

## 2025-05-18 RX ADMIN — FUROSEMIDE 40 MG: 10 INJECTION, SOLUTION INTRAMUSCULAR; INTRAVENOUS at 09:55

## 2025-05-18 RX ADMIN — HEPARIN SODIUM 5000 UNITS: 5000 INJECTION, SOLUTION INTRAVENOUS; SUBCUTANEOUS at 09:47

## 2025-05-18 RX ADMIN — INSULIN GLARGINE 0.26 UNITS: 100 INJECTION, SOLUTION SUBCUTANEOUS at 21:55

## 2025-05-18 RX ADMIN — PREGABALIN 100 MG: 100 CAPSULE ORAL at 20:01

## 2025-05-18 RX ADMIN — Medication 1 APPLIC: at 20:03

## 2025-05-18 RX ADMIN — INSULIN ASPART 2 UNITS: 100 INJECTION, SOLUTION INTRAVENOUS; SUBCUTANEOUS at 17:27

## 2025-05-18 RX ADMIN — DULOXETINE 60 MG: 60 CAPSULE, DELAYED RELEASE ORAL at 09:46

## 2025-05-19 VITALS — DIASTOLIC BLOOD PRESSURE: 85 MMHG | SYSTOLIC BLOOD PRESSURE: 151 MMHG

## 2025-05-19 VITALS — SYSTOLIC BLOOD PRESSURE: 129 MMHG | DIASTOLIC BLOOD PRESSURE: 63 MMHG

## 2025-05-19 VITALS — SYSTOLIC BLOOD PRESSURE: 140 MMHG | DIASTOLIC BLOOD PRESSURE: 81 MMHG

## 2025-05-19 VITALS — SYSTOLIC BLOOD PRESSURE: 156 MMHG | DIASTOLIC BLOOD PRESSURE: 90 MMHG

## 2025-05-19 VITALS — BODY MASS INDEX: 55 KG/M2

## 2025-05-19 DIAGNOSIS — E11.42 DIABETIC POLYNEUROPATHY ASSOCIATED WITH TYPE 2 DIABETES MELLITUS (HCC): ICD-10-CM

## 2025-05-19 DIAGNOSIS — G89.29 CHRONIC PAIN OF BOTH LOWER EXTREMITIES: ICD-10-CM

## 2025-05-19 DIAGNOSIS — G89.4 CHRONIC PAIN SYNDROME: ICD-10-CM

## 2025-05-19 DIAGNOSIS — M79.604 CHRONIC PAIN OF BOTH LOWER EXTREMITIES: ICD-10-CM

## 2025-05-19 DIAGNOSIS — M79.605 CHRONIC PAIN OF BOTH LOWER EXTREMITIES: ICD-10-CM

## 2025-05-19 LAB
BUN SERPL-MCNC: 23 MG/DL (ref 7–17)
CALCIUM SERPL-MCNC: 8.5 MG/DL (ref 8.4–10.2)
CHLORIDE SERPL-SCNC: 101 MMOL/L (ref 98–107)
CO2 SERPL-SCNC: 36 MMOL/L (ref 22–30)
ERYTHROCYTE [DISTWIDTH] IN BLOOD BY AUTOMATED COUNT: 17.3 % (ref 11.5–14.5)
ESTIMATED CREATININE CLEARANCE: 74 ML/MIN
GLUCOSE - POINT OF CARE: 213 MG/DL (ref 70–99)
GLUCOSE - POINT OF CARE: 217 MG/DL (ref 70–99)
GLUCOSE SERPL-MCNC: 226 MG/DL (ref 70–99)
HCT VFR BLD AUTO: 30.6 % (ref 37–47)
MAGNESIUM SERPL-MCNC: 1.6 MG/DL (ref 1.6–2.3)
MCHC RBC AUTO-ENTMCNC: 29.4 G/DL (ref 33–37)
MCV RBC AUTO: 88.2 FL (ref 81–99)
PLATELET # BLD AUTO: 236 10^3/UL (ref 130–400)
POTASSIUM SERPL-SCNC: 3.9 MMOL/L (ref 3.5–5.1)
SODIUM SERPL-SCNC: 144 MMOL/L (ref 135–145)

## 2025-05-19 RX ORDER — NORTRIPTYLINE HYDROCHLORIDE 75 MG/1
75 CAPSULE ORAL
Qty: 30 CAPSULE | Refills: 0 | OUTPATIENT
Start: 2025-05-19

## 2025-05-19 RX ADMIN — NEBIVOLOL 5 MG: 2.5 TABLET ORAL at 08:04

## 2025-05-19 RX ADMIN — NIFEDIPINE 30 MG: 30 TABLET, EXTENDED RELEASE ORAL at 08:04

## 2025-05-19 RX ADMIN — PREGABALIN 100 MG: 100 CAPSULE ORAL at 08:03

## 2025-05-19 RX ADMIN — HEPARIN SODIUM: 5000 INJECTION, SOLUTION INTRAVENOUS; SUBCUTANEOUS at 16:04

## 2025-05-19 RX ADMIN — CEFAZOLIN 10: 10 INJECTION, POWDER, FOR SOLUTION INTRAVENOUS at 05:38

## 2025-05-19 RX ADMIN — HEPARIN SODIUM 5000 UNITS: 5000 INJECTION, SOLUTION INTRAVENOUS; SUBCUTANEOUS at 08:06

## 2025-05-19 RX ADMIN — INSULIN ASPART 2 UNITS: 100 INJECTION, SOLUTION INTRAVENOUS; SUBCUTANEOUS at 08:07

## 2025-05-19 RX ADMIN — TAMSULOSIN HYDROCHLORIDE 0.4 MG: 0.4 CAPSULE ORAL at 08:03

## 2025-05-19 RX ADMIN — Medication 1 APPLIC: at 08:05

## 2025-05-19 RX ADMIN — INSULIN ASPART 2 UNITS: 100 INJECTION, SOLUTION INTRAVENOUS; SUBCUTANEOUS at 12:27

## 2025-05-19 RX ADMIN — FUROSEMIDE 40 MG: 40 TABLET ORAL at 08:04

## 2025-05-19 RX ADMIN — FAMOTIDINE 20 MG: 20 TABLET, FILM COATED ORAL at 08:04

## 2025-05-19 RX ADMIN — DULOXETINE 60 MG: 60 CAPSULE, DELAYED RELEASE ORAL at 08:04

## 2025-05-19 RX ADMIN — PANTOPRAZOLE SODIUM 40 MG: 40 TABLET, DELAYED RELEASE ORAL at 08:03

## 2025-05-19 RX ADMIN — INSULIN ASPART 10 UNITS: 100 INJECTION, SOLUTION INTRAVENOUS; SUBCUTANEOUS at 12:26

## 2025-05-19 RX ADMIN — INSULIN ASPART 8 UNITS: 100 INJECTION, SOLUTION INTRAVENOUS; SUBCUTANEOUS at 08:06

## 2025-05-19 RX ADMIN — ATORVASTATIN CALCIUM 40 MG: 40 TABLET, FILM COATED ORAL at 17:21

## 2025-05-19 RX ADMIN — Medication 2 FLUSH: at 15:00

## 2025-05-19 RX ADMIN — INSULIN ASPART: 100 INJECTION, SOLUTION INTRAVENOUS; SUBCUTANEOUS at 17:20

## 2025-05-19 RX ADMIN — CEFAZOLIN 10: 10 INJECTION, POWDER, FOR SOLUTION INTRAVENOUS at 14:59

## 2025-06-04 DIAGNOSIS — E11.42 DIABETIC POLYNEUROPATHY ASSOCIATED WITH TYPE 2 DIABETES MELLITUS (HCC): ICD-10-CM

## 2025-06-04 RX ORDER — DULOXETIN HYDROCHLORIDE 60 MG/1
60 CAPSULE, DELAYED RELEASE ORAL 2 TIMES DAILY
Qty: 60 CAPSULE | Refills: 2 | OUTPATIENT
Start: 2025-06-04

## 2025-06-19 ENCOUNTER — PATIENT MESSAGE (OUTPATIENT)
Dept: PAIN MEDICINE | Facility: CLINIC | Age: 55
End: 2025-06-19

## 2025-06-19 DIAGNOSIS — G89.29 CHRONIC PAIN OF BOTH LOWER EXTREMITIES: ICD-10-CM

## 2025-06-19 DIAGNOSIS — E11.42 DIABETIC POLYNEUROPATHY ASSOCIATED WITH TYPE 2 DIABETES MELLITUS (HCC): ICD-10-CM

## 2025-06-19 DIAGNOSIS — M79.604 CHRONIC PAIN OF BOTH LOWER EXTREMITIES: ICD-10-CM

## 2025-06-19 DIAGNOSIS — M79.605 CHRONIC PAIN OF BOTH LOWER EXTREMITIES: ICD-10-CM

## 2025-06-19 RX ORDER — PREGABALIN 200 MG/1
200 CAPSULE ORAL 3 TIMES DAILY
Qty: 90 CAPSULE | Refills: 1 | Status: SHIPPED | OUTPATIENT
Start: 2025-06-19

## 2025-06-19 NOTE — TELEPHONE ENCOUNTER
Reason for call:   [x] Refill   [] Prior Auth  [] Other:     Office:   [] PCP/Provider -   [x] Specialty/Provider -  [facility]  Authorized By: Mana Griffiths PA-C    Medication:    pregabalin (LYRICA) 200 MG capsule 200 mg, 3 times daily       Pharmacy: [facility] [id number] [facility] [place] - [address] [facility] [phone]    Local Pharmacy   Does the patient have enough for 3 days?   [] Yes   [x] No - Send as HP to POD    Mail Away Pharmacy   Does the patient have enough for 10 days?   [] Yes   [] No - Send as HP to POD

## 2025-07-28 ENCOUNTER — OFFICE VISIT (OUTPATIENT)
Dept: PAIN MEDICINE | Facility: CLINIC | Age: 55
End: 2025-07-28
Payer: COMMERCIAL

## 2025-07-28 VITALS — HEART RATE: 98 BPM | OXYGEN SATURATION: 93 % | HEIGHT: 66 IN | TEMPERATURE: 97.6 F | BODY MASS INDEX: 47.61 KG/M2

## 2025-07-28 DIAGNOSIS — G89.4 CHRONIC PAIN SYNDROME: ICD-10-CM

## 2025-07-28 DIAGNOSIS — E11.42 DIABETIC POLYNEUROPATHY ASSOCIATED WITH TYPE 2 DIABETES MELLITUS (HCC): ICD-10-CM

## 2025-07-28 DIAGNOSIS — M47.816 LUMBAR SPONDYLOSIS: ICD-10-CM

## 2025-07-28 DIAGNOSIS — M79.18 MYOFASCIAL PAIN SYNDROME: ICD-10-CM

## 2025-07-28 DIAGNOSIS — M46.1 SACROILIITIS (HCC): Primary | ICD-10-CM

## 2025-07-28 PROCEDURE — 99214 OFFICE O/P EST MOD 30 MIN: CPT | Performed by: PHYSICIAN ASSISTANT

## 2025-07-28 PROCEDURE — G2211 COMPLEX E/M VISIT ADD ON: HCPCS | Performed by: PHYSICIAN ASSISTANT

## 2025-07-28 RX ORDER — DULOXETIN HYDROCHLORIDE 60 MG/1
60 CAPSULE, DELAYED RELEASE ORAL DAILY
Qty: 30 CAPSULE | Refills: 2 | Status: SHIPPED | OUTPATIENT
Start: 2025-07-28

## 2025-07-28 RX ORDER — NORTRIPTYLINE HYDROCHLORIDE 75 MG/1
CAPSULE ORAL
Qty: 30 CAPSULE | Refills: 2 | Status: SHIPPED | OUTPATIENT
Start: 2025-07-28

## 2025-07-28 RX ORDER — PREGABALIN 200 MG/1
200 CAPSULE ORAL 3 TIMES DAILY
Qty: 90 CAPSULE | Refills: 2 | Status: SHIPPED | OUTPATIENT
Start: 2025-07-28

## 2025-08-08 ENCOUNTER — HOSPITAL ENCOUNTER (OUTPATIENT)
Dept: RADIOLOGY | Facility: CLINIC | Age: 55
Discharge: HOME | End: 2025-08-08
Attending: PHYSICIAN ASSISTANT
Payer: COMMERCIAL

## 2025-08-08 VITALS
OXYGEN SATURATION: 98 % | TEMPERATURE: 97.1 F | SYSTOLIC BLOOD PRESSURE: 173 MMHG | RESPIRATION RATE: 18 BRPM | HEART RATE: 98 BPM | DIASTOLIC BLOOD PRESSURE: 82 MMHG

## 2025-08-08 DIAGNOSIS — M46.1 SACROILIITIS (HCC): ICD-10-CM

## 2025-08-08 PROCEDURE — 27096 INJECT SACROILIAC JOINT: CPT | Performed by: ANESTHESIOLOGY

## 2025-08-08 RX ORDER — ROPIVACAINE HYDROCHLORIDE 2 MG/ML
1 INJECTION, SOLUTION EPIDURAL; INFILTRATION; PERINEURAL ONCE
Status: COMPLETED | OUTPATIENT
Start: 2025-08-08 | End: 2025-08-08

## 2025-08-08 RX ORDER — METHYLPREDNISOLONE ACETATE 80 MG/ML
40 INJECTION, SUSPENSION INTRA-ARTICULAR; INTRALESIONAL; INTRAMUSCULAR; PARENTERAL; SOFT TISSUE ONCE
Status: COMPLETED | OUTPATIENT
Start: 2025-08-08 | End: 2025-08-08

## 2025-08-08 RX ADMIN — ROPIVACAINE HYDROCHLORIDE 1 ML: 2 INJECTION EPIDURAL; INFILTRATION; PERINEURAL at 10:21

## 2025-08-08 RX ADMIN — METHYLPREDNISOLONE ACETATE 40 MG: 80 INJECTION, SUSPENSION INTRA-ARTICULAR; INTRALESIONAL; INTRAMUSCULAR; SOFT TISSUE at 10:21

## 2025-08-08 RX ADMIN — IOHEXOL 0.2 ML: 300 INJECTION, SOLUTION INTRAVENOUS at 10:21

## 2025-08-22 ENCOUNTER — TELEPHONE (OUTPATIENT)
Dept: PAIN MEDICINE | Facility: CLINIC | Age: 55
End: 2025-08-22